# Patient Record
Sex: FEMALE | Race: ASIAN | NOT HISPANIC OR LATINO
[De-identification: names, ages, dates, MRNs, and addresses within clinical notes are randomized per-mention and may not be internally consistent; named-entity substitution may affect disease eponyms.]

---

## 2023-06-01 PROBLEM — Z00.00 ENCOUNTER FOR PREVENTIVE HEALTH EXAMINATION: Status: ACTIVE | Noted: 2023-06-01

## 2023-06-02 ENCOUNTER — APPOINTMENT (OUTPATIENT)
Dept: FAMILY MEDICINE | Facility: CLINIC | Age: 58
End: 2023-06-02
Payer: MEDICAID

## 2023-06-02 VITALS
HEART RATE: 127 BPM | DIASTOLIC BLOOD PRESSURE: 86 MMHG | HEIGHT: 60.5 IN | BODY MASS INDEX: 26.78 KG/M2 | OXYGEN SATURATION: 88 % | SYSTOLIC BLOOD PRESSURE: 138 MMHG | WEIGHT: 140 LBS | TEMPERATURE: 98.9 F

## 2023-06-02 VITALS — OXYGEN SATURATION: 91 %

## 2023-06-02 DIAGNOSIS — Z87.2 PERSONAL HISTORY OF DISEASES OF THE SKIN AND SUBCUTANEOUS TISSUE: ICD-10-CM

## 2023-06-02 DIAGNOSIS — M32.9 SYSTEMIC LUPUS ERYTHEMATOSUS, UNSPECIFIED: ICD-10-CM

## 2023-06-02 DIAGNOSIS — J18.9 PNEUMONIA, UNSPECIFIED ORGANISM: ICD-10-CM

## 2023-06-02 DIAGNOSIS — Z86.39 PERSONAL HISTORY OF OTHER ENDOCRINE, NUTRITIONAL AND METABOLIC DISEASE: ICD-10-CM

## 2023-06-02 DIAGNOSIS — Z78.9 OTHER SPECIFIED HEALTH STATUS: ICD-10-CM

## 2023-06-02 DIAGNOSIS — Z00.01 ENCOUNTER FOR GENERAL ADULT MEDICAL EXAMINATION WITH ABNORMAL FINDINGS: ICD-10-CM

## 2023-06-02 PROCEDURE — 99204 OFFICE O/P NEW MOD 45 MIN: CPT | Mod: 25

## 2023-06-06 PROBLEM — J18.9 PNEUMONIA: Status: ACTIVE | Noted: 2023-06-02

## 2023-06-06 NOTE — HISTORY OF PRESENT ILLNESS
[FreeTextEntry6] : Pt was hospitalized in 2 weeks ago due to covid infection and pneumonia. Pt received anti-viral medication and was discharged home without antibiotic. Pt complained sob, especially with activity. Pt denied chest pain. Home oxygen saturation is around 88-90%. Pt was brought here for evaluation. Pt is a new patient here. Family member, sister stated that pt's condition is deteriorating. \par During hospitalization pt was diagnosed diabetes and started on insulin. Pt was discharged on lantus 10 units qhs and insulin lispro 3 units before each meal. Home morning fasting glucose was around 250-450.

## 2023-06-06 NOTE — REVIEW OF SYSTEMS
[Shortness Of Breath] : shortness of breath [Dyspnea on Exertion] : dyspnea on exertion [Chest Pain] : no chest pain

## 2023-06-06 NOTE — PHYSICAL EXAM
[Normal] : soft, non-tender, non-distended, no masses palpated, no HSM and normal bowel sounds [de-identified] : In mild respiratory distress, right basal crackles, and left basal poor air entry.

## 2023-06-06 NOTE — PHYSICAL EXAM
[Normal] : soft, non-tender, non-distended, no masses palpated, no HSM and normal bowel sounds [de-identified] : In mild respiratory distress, right basal crackles, and left basal poor air entry.

## 2023-06-06 NOTE — PHYSICAL EXAM
[Normal] : soft, non-tender, non-distended, no masses palpated, no HSM and normal bowel sounds [de-identified] : In mild respiratory distress, right basal crackles, and left basal poor air entry.

## 2023-06-15 ENCOUNTER — APPOINTMENT (OUTPATIENT)
Dept: FAMILY MEDICINE | Facility: CLINIC | Age: 58
End: 2023-06-15
Payer: MEDICAID

## 2023-06-15 VITALS — SYSTOLIC BLOOD PRESSURE: 120 MMHG | OXYGEN SATURATION: 96 % | HEART RATE: 122 BPM | DIASTOLIC BLOOD PRESSURE: 83 MMHG

## 2023-06-15 VITALS
OXYGEN SATURATION: 93 % | HEIGHT: 60 IN | SYSTOLIC BLOOD PRESSURE: 126 MMHG | BODY MASS INDEX: 27.29 KG/M2 | HEART RATE: 127 BPM | DIASTOLIC BLOOD PRESSURE: 73 MMHG | TEMPERATURE: 98 F | WEIGHT: 139 LBS

## 2023-06-15 DIAGNOSIS — R19.7 DIARRHEA, UNSPECIFIED: ICD-10-CM

## 2023-06-15 PROCEDURE — 99215 OFFICE O/P EST HI 40 MIN: CPT | Mod: 25

## 2023-06-15 NOTE — PHYSICAL EXAM
[Regular Rhythm] : with a regular rhythm [Normal S1, S2] : normal S1 and S2 [No Murmur] : no murmur heard [Normal] : normal rate, regular rhythm, normal S1 and S2 and no murmur heard [Soft] : abdomen soft [Non Tender] : non-tender [de-identified] : Pt is wearing oxygen 4 L/m with O2 Sat 93% in the office exam room. Pt is calm, with mild respiratory distress. Lung: clear, no wheezing and crackles with good air entry.  [de-identified] : Heart rate 127 /m.

## 2023-06-15 NOTE — REVIEW OF SYSTEMS
[Palpitations] : palpitations [Shortness Of Breath] : shortness of breath [Dyspnea on Exertion] : dyspnea on exertion [Diarrhea] : diarrhea [Chest Pain] : no chest pain [Abdominal Pain] : no abdominal pain [FreeTextEntry9] : No leg swelling

## 2023-06-15 NOTE — HISTORY OF PRESENT ILLNESS
[Post-hospitalization from ___ Hospital] : Post-hospitalization from [unfilled] Hospital [Admitted on: ___] : The patient was admitted on [unfilled] [Discharged on ___] : discharged on [unfilled] [Discharge Summary] : discharge summary [Discharge Med List] : discharge medication list [Med Reconciliation] : medication reconciliation has been completed [FreeTextEntry2] : Pt came back for post-hospitalization follow up. Pt was hospitalized in [facility] from 6/2-13/23. Pt was diagnosed acute pulmonary failure due to covid infection and pneumonia. Pt received IV antibiotics and oxygen treatment. During the hospitalization CT of chest showed mass (lung vs mediastinum) s/p biopsy, pending pathology reports. No access to CT of chest reports. Sister will reach out to hospital for all blood, urine and CT of chest reports for pcp. Pt was discharged with portable home oxygen 2-4 L/m. \par Pt developed loose diarrhea this morning, once, no abdominal pain and fever.

## 2023-06-21 ENCOUNTER — APPOINTMENT (OUTPATIENT)
Dept: PULMONOLOGY | Facility: CLINIC | Age: 58
End: 2023-06-21
Payer: MEDICAID

## 2023-06-21 VITALS
HEIGHT: 60 IN | TEMPERATURE: 98 F | BODY MASS INDEX: 26.9 KG/M2 | HEART RATE: 123 BPM | SYSTOLIC BLOOD PRESSURE: 136 MMHG | OXYGEN SATURATION: 88 % | DIASTOLIC BLOOD PRESSURE: 87 MMHG | RESPIRATION RATE: 16 BRPM | WEIGHT: 137 LBS

## 2023-06-21 DIAGNOSIS — R00.0 TACHYCARDIA, UNSPECIFIED: ICD-10-CM

## 2023-06-21 PROCEDURE — 99203 OFFICE O/P NEW LOW 30 MIN: CPT

## 2023-06-21 NOTE — PHYSICAL EXAM
[No Acute Distress] : no acute distress [Normal Oropharynx] : normal oropharynx [Normal Appearance] : normal appearance [No Neck Mass] : no neck mass [Normal S1, S2] : normal s1, s2 [No Murmurs] : no murmurs [No Resp Distress] : no resp distress [Rhonchi] : rhonchi [No Abnormalities] : no abnormalities [Benign] : benign [Normal Gait] : normal gait [No Cyanosis] : no cyanosis [No Edema] : no edema [FROM] : FROM [Normal Color/ Pigmentation] : normal color/ pigmentation [No Focal Deficits] : no focal deficits [Oriented x3] : oriented x3 [Normal Affect] : normal affect [TextBox_54] : Tachycardic [TextBox_105] : digital clubbing

## 2023-06-21 NOTE — REVIEW OF SYSTEMS
[Fatigue] : fatigue [Sputum] : sputum [SOB on Exertion] : sob on exertion [Negative] : Endocrine [Cough] : no cough

## 2023-06-21 NOTE — DISCUSSION/SUMMARY
[FreeTextEntry1] : 58-year-old female with recently diagnosed thymoma patient was noted to be tachycardic and tachypneic in the office.  She was not able to perform pulmonary function test.  Upon review of her medical documents it was recommended to her and her sister who was present that she be seen in the emergency room.  Thoracic surgery intervention is indicated.  She is to follow-up with her PMD as before.

## 2023-06-21 NOTE — HISTORY OF PRESENT ILLNESS
[Never] : never [TextBox_4] : 58-year-old female presents for evaluation of shortness of breath and cough occasionally productive, since recent discharge from [facility].  The patient was admitted because of similar symptoms, noted to have a mediastinal mass.  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  She was discharged home with scheduled follow-up with CT surgery and oncology.  Patient was also discharged home on nasal oxygen.  The month prior the patient was admitted for COVID-19 infection complaining of shortness of breath and cough at the time.  She denies any prior pulmonary problems.  She is a never smoker.  She has a history of SLE diagnosed 10 years ago having stopped treatment and follow-up with rheumatology almost 5 years prior.  At present she denies fever, chills, chest pain or hemoptysis. [TextBox_29] : Denies snoring, daytime somnolence, apneic episodes, AM headaches

## 2023-06-22 ENCOUNTER — INPATIENT (INPATIENT)
Facility: HOSPITAL | Age: 58
LOS: 7 days | Discharge: HOME | End: 2023-06-30
Attending: HOSPITALIST | Admitting: HOSPITALIST
Payer: MEDICAID

## 2023-06-22 VITALS
DIASTOLIC BLOOD PRESSURE: 84 MMHG | SYSTOLIC BLOOD PRESSURE: 127 MMHG | OXYGEN SATURATION: 100 % | TEMPERATURE: 99 F | HEART RATE: 132 BPM | RESPIRATION RATE: 24 BRPM

## 2023-06-22 LAB
ALBUMIN SERPL ELPH-MCNC: 3.1 G/DL — LOW (ref 3.3–5)
ALP SERPL-CCNC: 123 U/L — HIGH (ref 40–120)
ALT FLD-CCNC: 28 U/L — SIGNIFICANT CHANGE UP (ref 4–33)
ANION GAP SERPL CALC-SCNC: 14 MMOL/L — SIGNIFICANT CHANGE UP (ref 7–14)
ANISOCYTOSIS BLD QL: SIGNIFICANT CHANGE UP
APPEARANCE UR: ABNORMAL
APTT BLD: 32.3 SEC — SIGNIFICANT CHANGE UP (ref 27–36.3)
AST SERPL-CCNC: 47 U/L — HIGH (ref 4–32)
B PERT DNA SPEC QL NAA+PROBE: SIGNIFICANT CHANGE UP
B PERT+PARAPERT DNA PNL SPEC NAA+PROBE: SIGNIFICANT CHANGE UP
BASE EXCESS BLDV CALC-SCNC: 4.3 MMOL/L — HIGH (ref -2–3)
BASOPHILS # BLD AUTO: 0.08 K/UL — SIGNIFICANT CHANGE UP (ref 0–0.2)
BASOPHILS NFR BLD AUTO: 0.9 % — SIGNIFICANT CHANGE UP (ref 0–2)
BILIRUB SERPL-MCNC: 0.5 MG/DL — SIGNIFICANT CHANGE UP (ref 0.2–1.2)
BILIRUB UR-MCNC: NEGATIVE — SIGNIFICANT CHANGE UP
BLOOD GAS VENOUS COMPREHENSIVE RESULT: SIGNIFICANT CHANGE UP
BORDETELLA PARAPERTUSSIS (RAPRVP): SIGNIFICANT CHANGE UP
BUN SERPL-MCNC: 9 MG/DL — SIGNIFICANT CHANGE UP (ref 7–23)
C PNEUM DNA SPEC QL NAA+PROBE: SIGNIFICANT CHANGE UP
CALCIUM SERPL-MCNC: 9.3 MG/DL — SIGNIFICANT CHANGE UP (ref 8.4–10.5)
CHLORIDE BLDV-SCNC: 99 MMOL/L — SIGNIFICANT CHANGE UP (ref 96–108)
CHLORIDE SERPL-SCNC: 99 MMOL/L — SIGNIFICANT CHANGE UP (ref 98–107)
CO2 BLDV-SCNC: 32.2 MMOL/L — HIGH (ref 22–26)
CO2 SERPL-SCNC: 22 MMOL/L — SIGNIFICANT CHANGE UP (ref 22–31)
COLOR SPEC: YELLOW — SIGNIFICANT CHANGE UP
CREAT SERPL-MCNC: 0.46 MG/DL — LOW (ref 0.5–1.3)
DACRYOCYTES BLD QL SMEAR: SLIGHT — SIGNIFICANT CHANGE UP
DIFF PNL FLD: NEGATIVE — SIGNIFICANT CHANGE UP
EGFR: 111 ML/MIN/1.73M2 — SIGNIFICANT CHANGE UP
ELLIPTOCYTES BLD QL SMEAR: SIGNIFICANT CHANGE UP
EOSINOPHIL # BLD AUTO: 0.16 K/UL — SIGNIFICANT CHANGE UP (ref 0–0.5)
EOSINOPHIL NFR BLD AUTO: 1.7 % — SIGNIFICANT CHANGE UP (ref 0–6)
EPI CELLS # UR: 2 /HPF — SIGNIFICANT CHANGE UP (ref 0–5)
FLUAV SUBTYP SPEC NAA+PROBE: SIGNIFICANT CHANGE UP
FLUBV RNA SPEC QL NAA+PROBE: SIGNIFICANT CHANGE UP
GAS PNL BLDV: 132 MMOL/L — LOW (ref 136–145)
GIANT PLATELETS BLD QL SMEAR: PRESENT — SIGNIFICANT CHANGE UP
GLUCOSE BLDV-MCNC: 192 MG/DL — HIGH (ref 70–99)
GLUCOSE SERPL-MCNC: 201 MG/DL — HIGH (ref 70–99)
GLUCOSE UR QL: NEGATIVE — SIGNIFICANT CHANGE UP
HADV DNA SPEC QL NAA+PROBE: SIGNIFICANT CHANGE UP
HCO3 BLDV-SCNC: 30 MMOL/L — HIGH (ref 22–29)
HCOV 229E RNA SPEC QL NAA+PROBE: SIGNIFICANT CHANGE UP
HCOV HKU1 RNA SPEC QL NAA+PROBE: SIGNIFICANT CHANGE UP
HCOV NL63 RNA SPEC QL NAA+PROBE: SIGNIFICANT CHANGE UP
HCOV OC43 RNA SPEC QL NAA+PROBE: SIGNIFICANT CHANGE UP
HCT VFR BLD CALC: 27.5 % — LOW (ref 34.5–45)
HCT VFR BLDA CALC: 27 % — LOW (ref 34.5–46.5)
HGB BLD CALC-MCNC: 8.9 G/DL — LOW (ref 11.7–16.1)
HGB BLD-MCNC: 8.3 G/DL — LOW (ref 11.5–15.5)
HMPV RNA SPEC QL NAA+PROBE: SIGNIFICANT CHANGE UP
HPIV1 RNA SPEC QL NAA+PROBE: SIGNIFICANT CHANGE UP
HPIV2 RNA SPEC QL NAA+PROBE: SIGNIFICANT CHANGE UP
HPIV3 RNA SPEC QL NAA+PROBE: SIGNIFICANT CHANGE UP
HPIV4 RNA SPEC QL NAA+PROBE: SIGNIFICANT CHANGE UP
HYPOCHROMIA BLD QL: SIGNIFICANT CHANGE UP
IANC: 6.49 K/UL — SIGNIFICANT CHANGE UP (ref 1.8–7.4)
INR BLD: 1.24 RATIO — HIGH (ref 0.88–1.16)
KETONES UR-MCNC: NEGATIVE — SIGNIFICANT CHANGE UP
LACTATE BLDV-MCNC: 2.3 MMOL/L — HIGH (ref 0.5–2)
LEUKOCYTE ESTERASE UR-ACNC: ABNORMAL
LYMPHOCYTES # BLD AUTO: 1.52 K/UL — SIGNIFICANT CHANGE UP (ref 1–3.3)
LYMPHOCYTES # BLD AUTO: 16.5 % — SIGNIFICANT CHANGE UP (ref 13–44)
M PNEUMO DNA SPEC QL NAA+PROBE: SIGNIFICANT CHANGE UP
MCHC RBC-ENTMCNC: 17.8 PG — LOW (ref 27–34)
MCHC RBC-ENTMCNC: 30.2 GM/DL — LOW (ref 32–36)
MCV RBC AUTO: 58.9 FL — LOW (ref 80–100)
MICROCYTES BLD QL: SIGNIFICANT CHANGE UP
MONOCYTES # BLD AUTO: 0.32 K/UL — SIGNIFICANT CHANGE UP (ref 0–0.9)
MONOCYTES NFR BLD AUTO: 3.5 % — SIGNIFICANT CHANGE UP (ref 2–14)
NEUTROPHILS # BLD AUTO: 6.98 K/UL — SIGNIFICANT CHANGE UP (ref 1.8–7.4)
NEUTROPHILS NFR BLD AUTO: 75.7 % — SIGNIFICANT CHANGE UP (ref 43–77)
NITRITE UR-MCNC: NEGATIVE — SIGNIFICANT CHANGE UP
OVALOCYTES BLD QL SMEAR: SLIGHT — SIGNIFICANT CHANGE UP
PCO2 BLDV: 54 MMHG — HIGH (ref 39–52)
PH BLDV: 7.36 — SIGNIFICANT CHANGE UP (ref 7.32–7.43)
PH UR: 6 — SIGNIFICANT CHANGE UP (ref 5–8)
PLAT MORPH BLD: NORMAL — SIGNIFICANT CHANGE UP
PLATELET # BLD AUTO: 401 K/UL — HIGH (ref 150–400)
PLATELET COUNT - ESTIMATE: NORMAL — SIGNIFICANT CHANGE UP
PO2 BLDV: 20 MMHG — LOW (ref 25–45)
POIKILOCYTOSIS BLD QL AUTO: SIGNIFICANT CHANGE UP
POLYCHROMASIA BLD QL SMEAR: SLIGHT — SIGNIFICANT CHANGE UP
POTASSIUM BLDV-SCNC: 4.1 MMOL/L — SIGNIFICANT CHANGE UP (ref 3.5–5.1)
POTASSIUM SERPL-MCNC: 5.1 MMOL/L — SIGNIFICANT CHANGE UP (ref 3.5–5.3)
POTASSIUM SERPL-SCNC: 5.1 MMOL/L — SIGNIFICANT CHANGE UP (ref 3.5–5.3)
PROT SERPL-MCNC: 6.4 G/DL — SIGNIFICANT CHANGE UP (ref 6–8.3)
PROT UR-MCNC: ABNORMAL
PROTHROM AB SERPL-ACNC: 14.4 SEC — HIGH (ref 10.5–13.4)
RAPID RVP RESULT: SIGNIFICANT CHANGE UP
RBC # BLD: 4.67 M/UL — SIGNIFICANT CHANGE UP (ref 3.8–5.2)
RBC # FLD: 20.1 % — HIGH (ref 10.3–14.5)
RBC BLD AUTO: ABNORMAL
RBC CASTS # UR COMP ASSIST: 1 /HPF — SIGNIFICANT CHANGE UP (ref 0–4)
RSV RNA SPEC QL NAA+PROBE: SIGNIFICANT CHANGE UP
RV+EV RNA SPEC QL NAA+PROBE: SIGNIFICANT CHANGE UP
SAO2 % BLDV: 22.9 % — LOW (ref 67–88)
SARS-COV-2 RNA SPEC QL NAA+PROBE: SIGNIFICANT CHANGE UP
SCHISTOCYTES BLD QL AUTO: SIGNIFICANT CHANGE UP
SODIUM SERPL-SCNC: 135 MMOL/L — SIGNIFICANT CHANGE UP (ref 135–145)
SP GR SPEC: 1.02 — SIGNIFICANT CHANGE UP (ref 1.01–1.05)
TROPONIN T, HIGH SENSITIVITY RESULT: 10 NG/L — SIGNIFICANT CHANGE UP
UROBILINOGEN FLD QL: SIGNIFICANT CHANGE UP
VARIANT LYMPHS # BLD: 1.7 % — SIGNIFICANT CHANGE UP (ref 0–6)
WBC # BLD: 9.22 K/UL — SIGNIFICANT CHANGE UP (ref 3.8–10.5)
WBC # FLD AUTO: 9.22 K/UL — SIGNIFICANT CHANGE UP (ref 3.8–10.5)
WBC UR QL: 30 /HPF — SIGNIFICANT CHANGE UP (ref 0–5)

## 2023-06-22 PROCEDURE — G1004: CPT

## 2023-06-22 PROCEDURE — 71045 X-RAY EXAM CHEST 1 VIEW: CPT | Mod: 26

## 2023-06-22 PROCEDURE — 99285 EMERGENCY DEPT VISIT HI MDM: CPT

## 2023-06-22 PROCEDURE — 71275 CT ANGIOGRAPHY CHEST: CPT | Mod: 26,MG

## 2023-06-22 RX ORDER — SODIUM CHLORIDE 9 MG/ML
500 INJECTION INTRAMUSCULAR; INTRAVENOUS; SUBCUTANEOUS ONCE
Refills: 0 | Status: COMPLETED | OUTPATIENT
Start: 2023-06-22 | End: 2023-06-22

## 2023-06-22 RX ORDER — ACETAMINOPHEN 500 MG
1000 TABLET ORAL ONCE
Refills: 0 | Status: COMPLETED | OUTPATIENT
Start: 2023-06-22 | End: 2023-06-22

## 2023-06-22 RX ADMIN — Medication 400 MILLIGRAM(S): at 21:34

## 2023-06-22 RX ADMIN — SODIUM CHLORIDE 500 MILLILITER(S): 9 INJECTION INTRAMUSCULAR; INTRAVENOUS; SUBCUTANEOUS at 23:52

## 2023-06-22 NOTE — ED PROVIDER NOTE - NS ED ROS FT
Constitutional:  (+) fever, (+) chills, (-) lethargy  Eyes:  (-) eye pain (-) visual changes  ENMT: (-) nasal discharge, (-) sore throat. (-) neck pain or stiffness  Cardiac: (-) chest pain (-) palpitations  Respiratory:  (+) cough (-) respiratory distress.   GI:  (-) nausea (-) vomiting (-) diarrhea (-) abdominal pain.  :  (-) dysuria (-) frequency (-) burning.  MS:  (-) back pain (-) joint pain.  Neuro:  (-) headache (-) numbness (-) tingling (-) focal weakness  Skin:  (-) rash  Except as documented in the HPI,  all other systems are negative

## 2023-06-22 NOTE — ED ADULT TRIAGE NOTE - CHIEF COMPLAINT QUOTE
Pt is a rapid response coming to from 's office for increased SOB and new diagnosis of lymphoma. currently on 3L NC. Denies chest pain, headache, dizziness. Hx DM, lupus.

## 2023-06-22 NOTE — ED ADULT NURSE NOTE - OBJECTIVE STATEMENT
Patient is awake and alert, sent in from her PMD for SOB at the office today.  Patient with o2 sat 87 on RM , palced on 3 liter nc , now o2 sat 100%. Patient tachycardiac, denies any pain .

## 2023-06-22 NOTE — ED PROCEDURE NOTE - US POC STATEMENT
The patient/family was/were informed of limited nature of the exam. Representative images were printed to be scanned into the chart or directly uploaded into the medical record.
No

## 2023-06-22 NOTE — ED PROVIDER NOTE - OBJECTIVE STATEMENT
58F pmh lupus, recently diagnosed thymoma, DM, and htn presents to the ed for shortness of breath, pt arrives tachycardic + tachypneic, pt states she has been feeling febrile. Pt saw pulmonologist and was told to come to the ED. Pt is not on chemo or treatmetn for lupus at this time. Pt recently admitted to [facility] for workup including lung biopsy.

## 2023-06-22 NOTE — ED PROVIDER NOTE - PROGRESS NOTE DETAILS
MARIA ALEJANDRA'Neo DO PGY-3: paged CT surg PA as this pt is here to see Dr. Travis Saldana (thoracic)

## 2023-06-22 NOTE — ED PROVIDER NOTE - CLINICAL SUMMARY MEDICAL DECISION MAKING FREE TEXT BOX
w/ recently diagnosed thymoma + tachycardia + tachypnea will get sepsis workup including cultures, emperic abx, pt has diffuse b lines on pocus so will get ct angio r/o pe, cautious fluid recussitation, abx, vbg w/ lactate, tumor lysis less likely as pt has no hx of chemotherapy, will give antipyretics, pt will require admission. shortness of breath improved w/ nasal cannula, at this time pt does not need bipap

## 2023-06-22 NOTE — ED PROVIDER NOTE - ATTENDING CONTRIBUTION TO CARE
57 yo M F hx SLE, DM2, HTN, recently dx with thymoma, pw c/o sob, palpitations and fever. Pt recently discharged from hospital ([facility]) where she was admitted for similar symptoms, but symptoms never improved, worsened today. Denies leg pain/swelling. On exam, pt tachypneic with shallow breathing, crackles R base, heart regular rhythm but tachycardic, abd soft nt/nd. Plan for sepsis labs, fluids, meds, CTA chest r/o PE, tba

## 2023-06-22 NOTE — ED PROVIDER NOTE - PHYSICAL EXAMINATION
CONSTITUTIONAL: ill appearing  SKIN: Warm dry, normal skin turgor  HEAD: NCAT  NECK: Supple; non tender. Full ROM.  CARD: hr 120, regular, no murmurs.  RESP: b/l basilar crackles  ABD: non-distended, no rebound or guarding.  MSK: no pedal edema, no calf tenderness  PSYCH: Cooperative, appropriate.

## 2023-06-23 DIAGNOSIS — E11.9 TYPE 2 DIABETES MELLITUS WITHOUT COMPLICATIONS: ICD-10-CM

## 2023-06-23 DIAGNOSIS — R06.02 SHORTNESS OF BREATH: ICD-10-CM

## 2023-06-23 DIAGNOSIS — Z90.710 ACQUIRED ABSENCE OF BOTH CERVIX AND UTERUS: Chronic | ICD-10-CM

## 2023-06-23 DIAGNOSIS — J96.01 ACUTE RESPIRATORY FAILURE WITH HYPOXIA: ICD-10-CM

## 2023-06-23 DIAGNOSIS — D49.89 NEOPLASM OF UNSPECIFIED BEHAVIOR OF OTHER SPECIFIED SITES: ICD-10-CM

## 2023-06-23 DIAGNOSIS — M32.9 SYSTEMIC LUPUS ERYTHEMATOSUS, UNSPECIFIED: ICD-10-CM

## 2023-06-23 DIAGNOSIS — Z29.9 ENCOUNTER FOR PROPHYLACTIC MEASURES, UNSPECIFIED: ICD-10-CM

## 2023-06-23 LAB
ANION GAP SERPL CALC-SCNC: 12 MMOL/L — SIGNIFICANT CHANGE UP (ref 7–14)
BUN SERPL-MCNC: 5 MG/DL — LOW (ref 7–23)
C3 SERPL-MCNC: 179 MG/DL — SIGNIFICANT CHANGE UP (ref 90–180)
C4 SERPL-MCNC: 52 MG/DL — HIGH (ref 10–40)
CALCIUM SERPL-MCNC: 9 MG/DL — SIGNIFICANT CHANGE UP (ref 8.4–10.5)
CHLORIDE SERPL-SCNC: 101 MMOL/L — SIGNIFICANT CHANGE UP (ref 98–107)
CO2 SERPL-SCNC: 26 MMOL/L — SIGNIFICANT CHANGE UP (ref 22–31)
CREAT ?TM UR-MCNC: 82 MG/DL — SIGNIFICANT CHANGE UP
CREAT SERPL-MCNC: 0.36 MG/DL — LOW (ref 0.5–1.3)
CRP SERPL-MCNC: 122.3 MG/L — HIGH
EGFR: 118 ML/MIN/1.73M2 — SIGNIFICANT CHANGE UP
ERYTHROCYTE [SEDIMENTATION RATE] IN BLOOD: 101 MM/HR — HIGH (ref 4–25)
FERRITIN SERPL-MCNC: 1924 NG/ML — HIGH (ref 15–150)
GLUCOSE BLDC GLUCOMTR-MCNC: 219 MG/DL — HIGH (ref 70–99)
GLUCOSE BLDC GLUCOMTR-MCNC: 269 MG/DL — HIGH (ref 70–99)
GLUCOSE BLDC GLUCOMTR-MCNC: 339 MG/DL — HIGH (ref 70–99)
GLUCOSE SERPL-MCNC: 181 MG/DL — HIGH (ref 70–99)
HAPTOGLOB SERPL-MCNC: 344 MG/DL — HIGH (ref 34–200)
HCT VFR BLD CALC: 28.6 % — LOW (ref 34.5–45)
HGB BLD-MCNC: 8.5 G/DL — LOW (ref 11.5–15.5)
LACTATE SERPL-SCNC: 0.7 MMOL/L — SIGNIFICANT CHANGE UP (ref 0.5–2)
LDH SERPL L TO P-CCNC: 336 U/L — HIGH (ref 135–225)
MAGNESIUM SERPL-MCNC: 2.1 MG/DL — SIGNIFICANT CHANGE UP (ref 1.6–2.6)
MCHC RBC-ENTMCNC: 17.4 PG — LOW (ref 27–34)
MCHC RBC-ENTMCNC: 29.7 GM/DL — LOW (ref 32–36)
MCV RBC AUTO: 58.5 FL — LOW (ref 80–100)
MRSA PCR RESULT.: SIGNIFICANT CHANGE UP
NRBC # BLD: 0 /100 WBCS — SIGNIFICANT CHANGE UP (ref 0–0)
NRBC # FLD: 0 K/UL — SIGNIFICANT CHANGE UP (ref 0–0)
PHOSPHATE SERPL-MCNC: 3.3 MG/DL — SIGNIFICANT CHANGE UP (ref 2.5–4.5)
PLATELET # BLD AUTO: 419 K/UL — HIGH (ref 150–400)
POTASSIUM SERPL-MCNC: 3.6 MMOL/L — SIGNIFICANT CHANGE UP (ref 3.5–5.3)
POTASSIUM SERPL-SCNC: 3.6 MMOL/L — SIGNIFICANT CHANGE UP (ref 3.5–5.3)
PROCALCITONIN SERPL-MCNC: 0.08 NG/ML — SIGNIFICANT CHANGE UP (ref 0.02–0.1)
PROCALCITONIN SERPL-MCNC: 0.09 NG/ML — SIGNIFICANT CHANGE UP (ref 0.02–0.1)
PROT ?TM UR-MCNC: 30 MG/DL — SIGNIFICANT CHANGE UP
PROT/CREAT UR-RTO: 0.4 RATIO — HIGH (ref 0–0.2)
RBC # BLD: 4.89 M/UL — SIGNIFICANT CHANGE UP (ref 3.8–5.2)
RBC # FLD: 20.1 % — HIGH (ref 10.3–14.5)
S AUREUS DNA NOSE QL NAA+PROBE: SIGNIFICANT CHANGE UP
SODIUM SERPL-SCNC: 139 MMOL/L — SIGNIFICANT CHANGE UP (ref 135–145)
TROPONIN T, HIGH SENSITIVITY RESULT: 10 NG/L — SIGNIFICANT CHANGE UP
WBC # BLD: 8.15 K/UL — SIGNIFICANT CHANGE UP (ref 3.8–10.5)
WBC # FLD AUTO: 8.15 K/UL — SIGNIFICANT CHANGE UP (ref 3.8–10.5)

## 2023-06-23 PROCEDURE — 99223 1ST HOSP IP/OBS HIGH 75: CPT

## 2023-06-23 PROCEDURE — 99254 IP/OBS CNSLTJ NEW/EST MOD 60: CPT | Mod: GC

## 2023-06-23 PROCEDURE — 99254 IP/OBS CNSLTJ NEW/EST MOD 60: CPT | Mod: 57

## 2023-06-23 PROCEDURE — 99233 SBSQ HOSP IP/OBS HIGH 50: CPT

## 2023-06-23 PROCEDURE — 93010 ELECTROCARDIOGRAM REPORT: CPT

## 2023-06-23 RX ORDER — VANCOMYCIN HCL 1 G
1000 VIAL (EA) INTRAVENOUS ONCE
Refills: 0 | Status: COMPLETED | OUTPATIENT
Start: 2023-06-23 | End: 2023-06-23

## 2023-06-23 RX ORDER — PIPERACILLIN AND TAZOBACTAM 4; .5 G/20ML; G/20ML
3.38 INJECTION, POWDER, LYOPHILIZED, FOR SOLUTION INTRAVENOUS EVERY 8 HOURS
Refills: 0 | Status: DISCONTINUED | OUTPATIENT
Start: 2023-06-23 | End: 2023-06-29

## 2023-06-23 RX ORDER — PIPERACILLIN AND TAZOBACTAM 4; .5 G/20ML; G/20ML
3.38 INJECTION, POWDER, LYOPHILIZED, FOR SOLUTION INTRAVENOUS ONCE
Refills: 0 | Status: COMPLETED | OUTPATIENT
Start: 2023-06-23 | End: 2023-06-23

## 2023-06-23 RX ORDER — INSULIN LISPRO 100/ML
2 VIAL (ML) SUBCUTANEOUS
Refills: 0 | Status: DISCONTINUED | OUTPATIENT
Start: 2023-06-23 | End: 2023-06-24

## 2023-06-23 RX ORDER — ONDANSETRON 8 MG/1
4 TABLET, FILM COATED ORAL EVERY 8 HOURS
Refills: 0 | Status: DISCONTINUED | OUTPATIENT
Start: 2023-06-23 | End: 2023-06-30

## 2023-06-23 RX ORDER — INSULIN LISPRO 100/ML
VIAL (ML) SUBCUTANEOUS
Refills: 0 | Status: DISCONTINUED | OUTPATIENT
Start: 2023-06-23 | End: 2023-06-24

## 2023-06-23 RX ORDER — IPRATROPIUM/ALBUTEROL SULFATE 18-103MCG
3 AEROSOL WITH ADAPTER (GRAM) INHALATION EVERY 6 HOURS
Refills: 0 | Status: DISCONTINUED | OUTPATIENT
Start: 2023-06-23 | End: 2023-06-23

## 2023-06-23 RX ORDER — VANCOMYCIN HCL 1 G
1000 VIAL (EA) INTRAVENOUS EVERY 12 HOURS
Refills: 0 | Status: DISCONTINUED | OUTPATIENT
Start: 2023-06-23 | End: 2023-06-25

## 2023-06-23 RX ORDER — ACETAMINOPHEN 500 MG
325 TABLET ORAL ONCE
Refills: 0 | Status: COMPLETED | OUTPATIENT
Start: 2023-06-23 | End: 2023-06-23

## 2023-06-23 RX ORDER — DEXTROSE 50 % IN WATER 50 %
25 SYRINGE (ML) INTRAVENOUS ONCE
Refills: 0 | Status: DISCONTINUED | OUTPATIENT
Start: 2023-06-23 | End: 2023-06-30

## 2023-06-23 RX ORDER — INSULIN GLARGINE 100 [IU]/ML
15 INJECTION, SOLUTION SUBCUTANEOUS
Refills: 0 | DISCHARGE

## 2023-06-23 RX ORDER — FOLIC ACID 0.8 MG
1 TABLET ORAL
Refills: 0 | DISCHARGE

## 2023-06-23 RX ORDER — ENOXAPARIN SODIUM 100 MG/ML
40 INJECTION SUBCUTANEOUS EVERY 24 HOURS
Refills: 0 | Status: DISCONTINUED | OUTPATIENT
Start: 2023-06-23 | End: 2023-06-30

## 2023-06-23 RX ORDER — FOLIC ACID 0.8 MG
1 TABLET ORAL DAILY
Refills: 0 | Status: DISCONTINUED | OUTPATIENT
Start: 2023-06-23 | End: 2023-06-30

## 2023-06-23 RX ORDER — LEVALBUTEROL 1.25 MG/.5ML
0.63 SOLUTION, CONCENTRATE RESPIRATORY (INHALATION) EVERY 6 HOURS
Refills: 0 | Status: DISCONTINUED | OUTPATIENT
Start: 2023-06-23 | End: 2023-06-26

## 2023-06-23 RX ORDER — LANOLIN ALCOHOL/MO/W.PET/CERES
3 CREAM (GRAM) TOPICAL AT BEDTIME
Refills: 0 | Status: DISCONTINUED | OUTPATIENT
Start: 2023-06-23 | End: 2023-06-30

## 2023-06-23 RX ORDER — SODIUM CHLORIDE 9 MG/ML
1000 INJECTION, SOLUTION INTRAVENOUS
Refills: 0 | Status: DISCONTINUED | OUTPATIENT
Start: 2023-06-23 | End: 2023-06-30

## 2023-06-23 RX ORDER — ACETAMINOPHEN 500 MG
650 TABLET ORAL EVERY 6 HOURS
Refills: 0 | Status: DISCONTINUED | OUTPATIENT
Start: 2023-06-23 | End: 2023-06-30

## 2023-06-23 RX ORDER — DEXTROSE 50 % IN WATER 50 %
15 SYRINGE (ML) INTRAVENOUS ONCE
Refills: 0 | Status: DISCONTINUED | OUTPATIENT
Start: 2023-06-23 | End: 2023-06-30

## 2023-06-23 RX ORDER — GLUCAGON INJECTION, SOLUTION 0.5 MG/.1ML
1 INJECTION, SOLUTION SUBCUTANEOUS ONCE
Refills: 0 | Status: DISCONTINUED | OUTPATIENT
Start: 2023-06-23 | End: 2023-06-30

## 2023-06-23 RX ORDER — ACETYLCYSTEINE 200 MG/ML
4 VIAL (ML) MISCELLANEOUS EVERY 8 HOURS
Refills: 0 | Status: DISCONTINUED | OUTPATIENT
Start: 2023-06-23 | End: 2023-06-26

## 2023-06-23 RX ORDER — INSULIN GLARGINE 100 [IU]/ML
10 INJECTION, SOLUTION SUBCUTANEOUS AT BEDTIME
Refills: 0 | Status: DISCONTINUED | OUTPATIENT
Start: 2023-06-23 | End: 2023-06-24

## 2023-06-23 RX ADMIN — Medication 1000 MILLIGRAM(S): at 01:04

## 2023-06-23 RX ADMIN — Medication 250 MILLIGRAM(S): at 01:17

## 2023-06-23 RX ADMIN — Medication 2 UNIT(S): at 18:34

## 2023-06-23 RX ADMIN — Medication 60 MILLIGRAM(S): at 20:45

## 2023-06-23 RX ADMIN — Medication 2 UNIT(S): at 12:44

## 2023-06-23 RX ADMIN — Medication 3: at 12:44

## 2023-06-23 RX ADMIN — Medication 325 MILLIGRAM(S): at 13:18

## 2023-06-23 RX ADMIN — Medication 1 MILLIGRAM(S): at 12:45

## 2023-06-23 RX ADMIN — INSULIN GLARGINE 10 UNIT(S): 100 INJECTION, SOLUTION SUBCUTANEOUS at 23:03

## 2023-06-23 RX ADMIN — LEVALBUTEROL 0.63 MILLIGRAM(S): 1.25 SOLUTION, CONCENTRATE RESPIRATORY (INHALATION) at 23:50

## 2023-06-23 RX ADMIN — Medication 650 MILLIGRAM(S): at 13:08

## 2023-06-23 RX ADMIN — Medication 600 MILLIGRAM(S): at 22:21

## 2023-06-23 RX ADMIN — PIPERACILLIN AND TAZOBACTAM 200 GRAM(S): 4; .5 INJECTION, POWDER, LYOPHILIZED, FOR SOLUTION INTRAVENOUS at 00:35

## 2023-06-23 RX ADMIN — ENOXAPARIN SODIUM 40 MILLIGRAM(S): 100 INJECTION SUBCUTANEOUS at 18:47

## 2023-06-23 RX ADMIN — Medication 2: at 18:35

## 2023-06-23 RX ADMIN — Medication 250 MILLIGRAM(S): at 20:45

## 2023-06-23 RX ADMIN — Medication 650 MILLIGRAM(S): at 21:45

## 2023-06-23 RX ADMIN — Medication 650 MILLIGRAM(S): at 18:32

## 2023-06-23 RX ADMIN — Medication 4 MILLILITER(S): at 23:50

## 2023-06-23 RX ADMIN — Medication 650 MILLIGRAM(S): at 21:15

## 2023-06-23 RX ADMIN — PIPERACILLIN AND TAZOBACTAM 25 GRAM(S): 4; .5 INJECTION, POWDER, LYOPHILIZED, FOR SOLUTION INTRAVENOUS at 23:03

## 2023-06-23 NOTE — CHART NOTE - NSCHARTNOTEFT_GEN_A_CORE
[facility] contacted me with recommendations - will change levaquin to zosyn/vanc (tolerated zosyn in ED).   Patient has also been tachycardiac off and on today, with intermittent fever. Will check repeat EKG.   Will follow up the rest of pulmonary recs tonight. Will sign out as above to night team.   Case discussed with attending. [facility] Robert contacted me with prelim recommendations - will change levaquin to zosyn/vanc (tolerated zosyn in ED).     Additionally, patient has also been tachycardiac off and on today, with intermittent fever.  Patient seen and examined again at bedside this evening. Labs in process of being drawn.   She reports some chest pressure from the epigastrium up to upper chest. She reports having upper chest pain previously, but now it feels lower down.   She said she feels like symptoms have been getting worse today.   She otherwise denies new complaints.     Vitals stable.   Lungs: decreased breath sounds throughout, crackles at left base (unchanged from admission)  Heart: +tachy, regular, no murmur   Abd: soft, NT, ND, BS+    1. Low suspicion for cardiac etiology at this time however will check EKG and troponin   2. Will follow up the rest of pulmonary recs tonight. Will sign out as above to night team.   3. Above events and plan discussed with Dr. Umaña

## 2023-06-23 NOTE — CONSULT NOTE ADULT - ASSESSMENT
57 yo F with PMH SLE (dx ?12 years ago, not on tx for past 5 years, initially w/ joint pain and pleural effusion), new dx thymoma, recent admission to [facility] x 2 for shortness of breath (initially with covid, then represented with continued shortness of breath) who presents with shortness of breath and abnormal CT chest.     # SLE  # b/l ground glass and reticular opacities  # chronic hypoxemic respiratory failure  # thymoma  Differential is broad and difficult to narrow without prior records. Currently, CT chest shows diffuse bilateral ground glass and reticular opacities. Presumably, these were not present during admission to [facility] as pt had bronchoscopy and mediastinal mass biopsy w/o lung biopsy (this probably would have been done if there were parenchymal abnormalities). However, she was discharged on 3L home O2 which is her current oxygen requirement. She has difficulty speaking in full sentences, though this seems to be due to choking sensation rather than sob.   It seems that she is having difficulty managing secretions and may have ptosis on exam, ?myasthenia.  Rheumatology concerned about MDA-5 disease in setting of rapidly progressive lung disease and fever.  - procal 0.09 -- low suspicion for pneumonia but will cover broadly for now   - BNP 43  - RVP negative  - CXR 6/22: b/l reticular nodular opacities  - CTA Chest 6/22: no PE, 3.9 x 4.9 cm mediastinal mass corresponding to known thymoma.  bilateral diffuse groundglass and reticular opacities with mild bronchiectasis        Plan:  - patient sister will go to [facility] tomorrow to obtain disc w/ prior CT/MRI for comparison  - would broaden abx from levaquin to vanc/zosyn as pt has recent hospitalizations  - duonebs q6  - please provide with aerobika (order as airway clearance device) to use w/ nebulized meds   - sputum cx  - f/u blood cx, urine cx  - f/u TTE  - please check NIF daily  - consider neurology consult   - swallow eval  - agree w/ steroids per rheum (solumedrol 60 qd)  - f/u MDA-5 ab, anti Glory, myomarker panel, dsDNA, ANTOINE, Sjogren, C3, C4, ferritin  - check immunoglobulin levels, myasthenia serologies  - f/u quantiferon   - please obtain records from [facility] (prior hospitalization notes, radiology reports, autoimmune labs

## 2023-06-23 NOTE — CHART NOTE - NSCHARTNOTEFT_GEN_A_CORE
Records release form faxed to [facility] to obtain records from recent hospital stays in June and May 2023.   Will await fax back of records to 5S fax machine.   Patient currently stable, will continue to monitor.

## 2023-06-23 NOTE — CONSULT NOTE ADULT - SUBJECTIVE AND OBJECTIVE BOX
HPI:  58F lupus, DM2, recently diagnosed thymoma p/w shortness of breath. Patient was recently admitted twice to ([facility]) for shortness of breath since May. She was following up with her pulmonologist who recommended for pt to come in. Patient’s ongoing complaints have been of shortness of breath. This started earlier this year and worsened her initial admission to [facility] when she was diagnosed with COVID. Patient also has a history of lupus, diagnosed >10 years ago. She is suppose to be on Plaquenil, but since onset of COVID pandemic, has not followed with a rheumatologist so is no longer taking any lupus medications. Has central chest pain with cough. Cough has clear/white sputum. First fever was in the ED to 102. Pt is also on ?oxygen at home. States shes on 3L at rest. Denies any urinary pain, burning, or frequency.  (23 Jun 2023 08:30)    Thoracic surgery consulted for surgical recommendation for recently dx'd Thymoma. 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 Pt was seen in Pulm office, was pending recommendation of Thoracic Surgeon however pt instructed to go to ER with worsening SOB and clinical deterioration. Pt seen and examined. Appears to be very easy SOB during interview, taking frequent shallow breaths. Sitting at edge of bed, O2 NC in place. Pt appears to have difficulty finishing her sentences. She states her SOB seems worse over past day and since admission to hospital. States generally poor and worsening health since having COVID in May and has been hospitalized for PNA. Pt states no longer has outpt Rheum so hasn't been on her usual Lupus meds. Currently no pain. No HA, dizziness, chills, CP, Abd pain, n/v/d. Pt states frequent palpitations and wt loss.     PAST MEDICAL & SURGICAL HISTORY:  H/O thymoma      Type 2 diabetes mellitus      Systemic lupus      S/P hysterectomy          REVIEW OF SYSTEMS    Negative except for what's noted above    MEDICATIONS  (STANDING):  acetylcysteine 10%  Inhalation 4 milliLiter(s) Inhalation every 8 hours  dextrose 5%. 1000 milliLiter(s) (50 mL/Hr) IV Continuous <Continuous>  dextrose 50% Injectable 25 Gram(s) IV Push once  enoxaparin Injectable 40 milliGRAM(s) SubCutaneous every 24 hours  folic acid 1 milliGRAM(s) Oral daily  glucagon  Injectable 1 milliGRAM(s) IntraMuscular once  insulin glargine Injectable (LANTUS) 10 Unit(s) SubCutaneous at bedtime  insulin lispro (ADMELOG) corrective regimen sliding scale   SubCutaneous three times a day before meals  insulin lispro Injectable (ADMELOG) 2 Unit(s) SubCutaneous three times a day before meals  levoFLOXacin IVPB        MEDICATIONS  (PRN):  acetaminophen     Tablet .. 650 milliGRAM(s) Oral every 6 hours PRN Temp greater or equal to 38C (100.4F), Mild Pain (1 - 3)  albuterol/ipratropium for Nebulization 3 milliLiter(s) Nebulizer every 6 hours PRN Shortness of Breath and/or Wheezing  aluminum hydroxide/magnesium hydroxide/simethicone Suspension 30 milliLiter(s) Oral every 4 hours PRN Dyspepsia  dextrose Oral Gel 15 Gram(s) Oral once PRN Blood Glucose LESS THAN 70 milliGRAM(s)/deciliter  melatonin 3 milliGRAM(s) Oral at bedtime PRN Insomnia  ondansetron Injectable 4 milliGRAM(s) IV Push every 8 hours PRN Nausea and/or Vomiting      Allergies    amoxicillin (Rash (Mild to Mod))    Intolerances        SOCIAL HISTORY:    FAMILY HISTORY:  FH: type 2 diabetes        Vital Signs Last 24 Hrs  T(C): 38 (23 Jun 2023 13:04), Max: 38.9 (22 Jun 2023 21:38)  T(F): 100.4 (23 Jun 2023 13:04), Max: 102 (22 Jun 2023 21:38)  HR: 126 (23 Jun 2023 13:04) (84 - 132)  BP: 126/74 (23 Jun 2023 13:04) (109/65 - 127/84)  BP(mean): --  RR: 18 (23 Jun 2023 13:04) (16 - 24)  SpO2: 98% (23 Jun 2023 13:04) (98% - 100%)    Parameters below as of 23 Jun 2023 13:04  Patient On (Oxygen Delivery Method): nasal cannula  O2 Flow (L/min): 3    General: WD mild distress  Neurology: A&Ox3, nonfocal, ARREOLA x 4  Eyes: PERRLA/ EOMI, Gross vision intact  ENT/Neck: Neck supple, trachea midline, No JVD, Gross hearing intact  Respiratory: dec'd BS throughout with poor air entry  CV: RRR, S1S2, no murmurs, rubs or gallops. + tachycardic  Abdominal: Soft, NT, ND +BS,   Extremities: No edema, + peripheral pulses  Skin: No Rashes, Hematoma, Ecchymosis        LABS:                        8.5    8.15  )-----------( [id number]      ( 23 Jun 2023 08:51 )             28.6     06-23    139  |  101  |  5<L>  ----------------------------<  181<H>  3.6   |  26  |  0.36<L>    Ca    9.0      23 Jun 2023 08:51  Phos  3.3     06-23  Mg     2.10     06-23    TPro  6.4  /  Alb  3.1<L>  /  TBili  0.5  /  DBili  x   /  AST  47<H>  /  ALT  28  /  AlkPhos  123<H>  06-22    PT/INR - ( 22 Jun 2023 21:12 )   PT: 14.4 sec;   INR: 1.24 ratio         PTT - ( 22 Jun 2023 21:12 )  PTT:32.3 sec  Urinalysis Basic - ( 23 Jun 2023 08:51 )    Color: x / Appearance: x / SG: x / pH: x  Gluc: 181 mg/dL / Ketone: x  / Bili: x / Urobili: x   Blood: x / Protein: x / Nitrite: x   Leuk Esterase: x / RBC: x / WBC x   Sq Epi: x / Non Sq Epi: x / Bacteria: x        RADIOLOGY & ADDITIONAL STUDIES:    ACC: [id number] EXAM:  CT ANGIO CHEST PULM ART WAWIC   ORDERED BY:   ADITHYA SHELDON     PROCEDURE DATE:  06/22/2023          INTERPRETATION:  CLINICAL INFORMATION: Shortness of breath. Productive   cough at times. History of recent Covid infection. Thymoma. History of   SLE.    COMPARISON: None.    CONTRAST/COMPLICATIONS:  IV Contrast: Omnipaque 350  40 cc administered   60 cc discarded  Oral Contrast: NONE  Complications: None reported at time of study completion    PROCEDURE:  CT Angiography of the Chest.  Sagittal and coronal reformats were performed as well as 3D (MIP)   reconstructions.    FINDINGS:    LUNGS AND AIRWAYS: Patent central airways.  Nonspecific groundglass and   reticular interstitial opacities in both lungs with mild bronchiectasis.  PLEURA: No pleural effusion. No pneumothorax.  MEDIASTINUM AND GERARD: No lymphadenopathy. 3.9 x 4.9 right anterior   mediastinal mass likely corresponding to patient's known thymoma.  VESSELS: Adequate opacification of the pulmonary vasculature. No filling   defect to suggest acute pulmonary embolism.  HEART: Heart size is normal. No pericardial effusion.  CHEST WALL AND LOWER NECK: Coarse calcification in the left lobe of   thyroid gland.  VISUALIZED UPPER ABDOMEN: Within normal limits.  BONES: Within normal limits.    IMPRESSION:  No pulmonary embolism.  3.9 x 4.9 cm mediastinal mass corresponding to known thymoma.  Nonspecific groundglass and reticular opacities with mild bronchiectasis   in both lungs may be sequela of atypical infection or related to chronic   interstitial disease given history of SLE.    --- End of Report ---

## 2023-06-23 NOTE — PATIENT PROFILE ADULT - FALL HARM RISK - HARM RISK INTERVENTIONS

## 2023-06-23 NOTE — CONSULT NOTE ADULT - PROBLEM SELECTOR RECOMMENDATION 9
Pt is not a surgical candidate at this time due to significant pulmonary disease  Would recommend obtain Echo to eval for Pulmonary HTN  Recommend Pulmonary consult  Recommend send LDH, AFP, Beta HCG (tumor marker), Ant-acetylcholine receptor Antibodies  Above d/w Dr. Tao  Will continue to follow

## 2023-06-23 NOTE — H&P ADULT - NSHPLABSRESULTS_GEN_ALL_CORE
.  LABS:                         8.5    8.15  )-----------( 419      ( 23 Jun 2023 08:51 )             28.6     06-23    139  |  101  |  5<L>  ----------------------------<  181<H>  3.6   |  26  |  0.36<L>    Ca    9.0      23 Jun 2023 08:51  Phos  3.3     06-23  Mg     2.10     06-23    TPro  6.4  /  Alb  3.1<L>  /  TBili  0.5  /  DBili  x   /  AST  47<H>  /  ALT  28  /  AlkPhos  123<H>  06-22    PT/INR - ( 22 Jun 2023 21:12 )   PT: 14.4 sec;   INR: 1.24 ratio         PTT - ( 22 Jun 2023 21:12 )  PTT:32.3 sec  Urinalysis Basic - ( 23 Jun 2023 08:51 )    Color: x / Appearance: x / SG: x / pH: x  Gluc: 181 mg/dL / Ketone: x  / Bili: x / Urobili: x   Blood: x / Protein: x / Nitrite: x   Leuk Esterase: x / RBC: x / WBC x   Sq Epi: x / Non Sq Epi: x / Bacteria: x            Lactate, Blood: 0.7 mmol/L (06-23 @ 01:15)      RADIOLOGY, EKG & ADDITIONAL TESTS:    EKG: personally reviewed. NSR  BMP/CBC: personally reviewed. Stable Cr. Low Hg   CXR: personally reviewed. B/l opacities noted

## 2023-06-23 NOTE — CONSULT NOTE ADULT - SUBJECTIVE AND OBJECTIVE BOX
OSMAN JOHN  [id number]    HISTORY OF PRESENT ILLNESS:    58-year-old F w/PMH of SLE?, thymoma presented with SOB and fever. Patient was previously admission in [facility] due to current symptoms. Rheumatology was consulted to r/o SLE flare up.     At the ED, VS was remarkable for 102F, , RR24. Labs showed H/H 8.3/27.5, MCV 58.9, platelet 401. Chemistry showed glucose of 201, w/, AST 47. UA showed large leukocyte esterase concentration, wbc 30 w/trace protein in the urine. Inflammatory markers showed , and .3. CT chest showed  Nonspecific groundglass and reticular interstitial opacities in both lungs with mild bronchiectasis. 3.9 x 4.9 right anterior mediastinal mass likely corresponding to patient's known thymoma. Patient was started on empiric antibiotic for possible pneumonia.       Consult day     PAST MEDICAL & SURGICAL HISTORY:  H/O thymoma      Type 2 diabetes mellitus      Systemic lupus      S/P hysterectomy          Review of Systems:  Gen:  No fevers/chills, weight loss  HEENT: No blurry vision, no difficulty swallowing, no oral or nasal ulcers  CVS: No chest pain/palpitations  Resp: No SOB/wheezing  GI: No N/V/C/D/abdominal pain  MSK:  Skin: No new rashes  Neuro: No headaches    MEDICATIONS  (STANDING):  acetylcysteine 10%  Inhalation 4 milliLiter(s) Inhalation every 8 hours  dextrose 5%. 1000 milliLiter(s) (50 mL/Hr) IV Continuous <Continuous>  dextrose 50% Injectable 25 Gram(s) IV Push once  enoxaparin Injectable 40 milliGRAM(s) SubCutaneous every 24 hours  folic acid 1 milliGRAM(s) Oral daily  glucagon  Injectable 1 milliGRAM(s) IntraMuscular once  insulin glargine Injectable (LANTUS) 10 Unit(s) SubCutaneous at bedtime  insulin lispro (ADMELOG) corrective regimen sliding scale   SubCutaneous three times a day before meals  insulin lispro Injectable (ADMELOG) 2 Unit(s) SubCutaneous three times a day before meals  levoFLOXacin IVPB        MEDICATIONS  (PRN):  acetaminophen     Tablet .. 650 milliGRAM(s) Oral every 6 hours PRN Temp greater or equal to 38C (100.4F), Mild Pain (1 - 3)  albuterol/ipratropium for Nebulization 3 milliLiter(s) Nebulizer every 6 hours PRN Shortness of Breath and/or Wheezing  aluminum hydroxide/magnesium hydroxide/simethicone Suspension 30 milliLiter(s) Oral every 4 hours PRN Dyspepsia  dextrose Oral Gel 15 Gram(s) Oral once PRN Blood Glucose LESS THAN 70 milliGRAM(s)/deciliter  melatonin 3 milliGRAM(s) Oral at bedtime PRN Insomnia  ondansetron Injectable 4 milliGRAM(s) IV Push every 8 hours PRN Nausea and/or Vomiting      Allergies    amoxicillin (Rash (Mild to Mod))    Intolerances        PERTINENT MEDICATION HISTORY:    SOCIAL HISTORY:  OCCUPATION:  TRAVEL HISTORY:    FAMILY HISTORY:  FH: type 2 diabetes        Vital Signs Last 24 Hrs  T(C): 38 (23 Jun 2023 13:04), Max: 38.9 (22 Jun 2023 21:38)  T(F): 100.4 (23 Jun 2023 13:04), Max: 102 (22 Jun 2023 21:38)  HR: 126 (23 Jun 2023 13:04) (84 - 132)  BP: 126/74 (23 Jun 2023 13:04) (109/65 - 127/84)  BP(mean): --  RR: 18 (23 Jun 2023 13:04) (16 - 24)  SpO2: 98% (23 Jun 2023 13:04) (98% - 100%)    Parameters below as of 23 Jun 2023 13:04  Patient On (Oxygen Delivery Method): nasal cannula  O2 Flow (L/min): 3      Physical Exam:  General: No apparent distress  HEENT: EOMI, MMM  CVS: +S1/S2, RRR, no murmurs/rubs/gallops  Resp: CTA b/l. No crackles/wheezing  GI: Soft, NT/ND +BS  MSK:  Neuro: AAOx3  Skin: no visible rashes    LABS:                        8.5    8.15  )-----------( 419      ( 23 Jun 2023 08:51 )             28.6     06-23    139  |  101  |  5<L>  ----------------------------<  181<H>  3.6   |  26  |  0.36<L>    Ca    9.0      23 Jun 2023 08:51  Phos  3.3     06-23  Mg     2.10     06-23    TPro  6.4  /  Alb  3.1<L>  /  TBili  0.5  /  DBili  x   /  AST  47<H>  /  ALT  28  /  AlkPhos  123<H>  06-22    PT/INR - ( 22 Jun 2023 21:12 )   PT: 14.4 sec;   INR: 1.24 ratio         PTT - ( 22 Jun 2023 21:12 )  PTT:32.3 sec  Urinalysis Basic - ( 23 Jun 2023 08:51 )    Color: x / Appearance: x / SG: x / pH: x  Gluc: 181 mg/dL / Ketone: x  / Bili: x / Urobili: x   Blood: x / Protein: x / Nitrite: x   Leuk Esterase: x / RBC: x / WBC x   Sq Epi: x / Non Sq Epi: x / Bacteria: x        RADIOLOGY & ADDITIONAL STUDIES:     OSMAN LOPEZ  [id number]    HISTORY OF PRESENT ILLNESS:    58-year-old F w/PMH of SLE?, thymoma presented with SOB and fever. Patient was previously admission in [facility] due to current symptoms. Rheumatology was consulted for further management     At the ED, VS was remarkable for 102F, , RR24. Labs showed H/H 8.3/27.5, MCV 58.9, platelet 401. Chemistry showed glucose of 201, w/, AST 47. UA showed large leukocyte esterase concentration, wbc 30 w/trace protein in the urine. Inflammatory markers showed , and .3. CT chest showed  Nonspecific groundglass and reticular interstitial opacities in both lungs with mild bronchiectasis. 3.9 x 4.9 right anterior mediastinal mass likely corresponding to patient's known thymoma. Patient was started on empiric antibiotic for possible pneumonia.     Consult day:  Patient was seen at the bedside. Patient stated that she was on her usual states and started feeling around 5/12/23. Patient stated that she was tested twice for COVID19 and the second time she tested +ve. Through that time, patient feeling fatigue but no SOB. After a week later, she was DC and went to see her primary physician who noticed worsening respiratory status. Patient was having crackles and recommended to go to the hospital. She went for second time requiring ncO2 and she underwent for bronchoscopy. Patient was treatment for COVID in a meantime. Subsequently w/o further improvement, they noticed a mass in the mediastinal window which they got the biopsy. Patient was DC w/3L of ncO2. Patient remains with SOB and came to our ED.     ROS:   Positive: SOB, dry cough, fatigue, fever, chills, night sweats  Negative: RF, sicca, symptoms, muscle weakness, eye pain, eye redness, vision changes, photosensitivity, abdominal pain, n/v/d/c, changes in urinary freq, dysuria, hematuria, foamy urine    #SLE hx?   dx 2010  Previous rheumatologist: Martinez Lopez MD (stop following due to insurance issue since prepandemic COVID 19 era)   Clinical manifestation: SOB, and joint swelling  Previous treatment: steroid, and HCQ  Current treatment: none due to insurance issue      PAST MEDICAL & SURGICAL HISTORY:  H/O thymoma      Type 2 diabetes mellitus      Systemic lupus      S/P hysterectomy          Review of Systems:  H&P    MEDICATIONS  (STANDING):  acetylcysteine 10%  Inhalation 4 milliLiter(s) Inhalation every 8 hours  dextrose 5%. 1000 milliLiter(s) (50 mL/Hr) IV Continuous <Continuous>  dextrose 50% Injectable 25 Gram(s) IV Push once  enoxaparin Injectable 40 milliGRAM(s) SubCutaneous every 24 hours  folic acid 1 milliGRAM(s) Oral daily  glucagon  Injectable 1 milliGRAM(s) IntraMuscular once  insulin glargine Injectable (LANTUS) 10 Unit(s) SubCutaneous at bedtime  insulin lispro (ADMELOG) corrective regimen sliding scale   SubCutaneous three times a day before meals  insulin lispro Injectable (ADMELOG) 2 Unit(s) SubCutaneous three times a day before meals  levoFLOXacin IVPB        MEDICATIONS  (PRN):  acetaminophen     Tablet .. 650 milliGRAM(s) Oral every 6 hours PRN Temp greater or equal to 38C (100.4F), Mild Pain (1 - 3)  albuterol/ipratropium for Nebulization 3 milliLiter(s) Nebulizer every 6 hours PRN Shortness of Breath and/or Wheezing  aluminum hydroxide/magnesium hydroxide/simethicone Suspension 30 milliLiter(s) Oral every 4 hours PRN Dyspepsia  dextrose Oral Gel 15 Gram(s) Oral once PRN Blood Glucose LESS THAN 70 milliGRAM(s)/deciliter  melatonin 3 milliGRAM(s) Oral at bedtime PRN Insomnia  ondansetron Injectable 4 milliGRAM(s) IV Push every 8 hours PRN Nausea and/or Vomiting      Allergies    amoxicillin (Rash (Mild to Mod))    Intolerances        PERTINENT MEDICATION HISTORY:    SOCIAL HISTORY:  OCCUPATION:  TRAVEL HISTORY:    FAMILY HISTORY:  FH: type 2 diabetes        Vital Signs Last 24 Hrs  T(C): 38 (23 Jun 2023 13:04), Max: 38.9 (22 Jun 2023 21:38)  T(F): 100.4 (23 Jun 2023 13:04), Max: 102 (22 Jun 2023 21:38)  HR: 126 (23 Jun 2023 13:04) (84 - 132)  BP: 126/74 (23 Jun 2023 13:04) (109/65 - 127/84)  BP(mean): --  RR: 18 (23 Jun 2023 13:04) (16 - 24)  SpO2: 98% (23 Jun 2023 13:04) (98% - 100%)    Parameters below as of 23 Jun 2023 13:04  Patient On (Oxygen Delivery Method): nasal cannula  O2 Flow (L/min): 3      Physical Exam:  General: No apparent distress  HEENT: EOMI, MMM  CVS: +S1/S2, RRR, no murmurs/rubs/gallops  Resp: Hypoventilation b/l lungs   GI: Soft, NT/ND +BS  MSK: no synovitis, joints NTP    tongue: midline, no deviation   neck flexion/extension: 5/5   deltoid: 5/5 b/l  biceps/triceps: 5/5 b/l  Hand/: 5/5 b/l  Hip flexion/extension: 5/5 b/l  knee flexion/extension: 5/5 b/l    dorsiflex/plantar flex: 5/5 b/l  Neuro: AAOx3  Skin: no visible rashes    LABS:                        8.5    8.15  )-----------( 419      ( 23 Jun 2023 08:51 )             28.6     06-23    139  |  101  |  5<L>  ----------------------------<  181<H>  3.6   |  26  |  0.36<L>    Ca    9.0      23 Jun 2023 08:51  Phos  3.3     06-23  Mg     2.10     06-23    TPro  6.4  /  Alb  3.1<L>  /  TBili  0.5  /  DBili  x   /  AST  47<H>  /  ALT  28  /  AlkPhos  123<H>  06-22    PT/INR - ( 22 Jun 2023 21:12 )   PT: 14.4 sec;   INR: 1.24 ratio         PTT - ( 22 Jun 2023 21:12 )  PTT:32.3 sec  Urinalysis Basic - ( 23 Jun 2023 08:51 )    Color: x / Appearance: x / SG: x / pH: x  Gluc: 181 mg/dL / Ketone: x  / Bili: x / Urobili: x   Blood: x / Protein: x / Nitrite: x   Leuk Esterase: x / RBC: x / WBC x   Sq Epi: x / Non Sq Epi: x / Bacteria: x      /Creatinine Ratio Calculation: 0.4 Ratio (06.23.23 @ 15:09) C-Reactive Protein, Serum: 122.3 mg/L (06.23.23 @ 11:44) Sedimentation Rate, Erythrocyte: 101 mm/hr (06.23.23 @ 11:44) C4 Complement, Serum: 52 mg/dL (06.23.23 @ 11:44) C3 Complement, Serum: 179 mg/dL (06.23.23 @ 11:44) Procalcitonin, Serum: 0.09Chlamydophila pneumoniae; and SARS-CoV-2.  Adenovirus (RapRVP): NotDetec  Influenza A (RapRVP): NotDetec  Influenza B (RapRVP): NotDetec  Parainfluenza 1 (RapRVP): NotDetec  Parainfluenza 2 (RapRVP): NotDetec  Parainfluenza 3 (RapRVP): NotDetec  Parainfluenza 4 (RapRVP): NotDetec  Resp Syncytial Virus (RapRVP): NotDetec  Bordetella pertussis (RapRVP): NotDetec  Bordetella parapertussis (RapRVP): NotDetec  Chlamydia pneumoniae (RapRVP): NotDetec  Mycoplasma pneumoniae (RapRVP): NotDetec  Entero/Rhinovirus (RapRVP): NotDetec  HKU1 Coronavirus (RapRVP): NotDetec  NL63 Coronavirus (RapRVP): NotDetec  229E Coronavirus (RapRVP): NotDetec  OC43 Coronavirus (RapRVP): NotDetec  hMPV (RapRVP): NotDetec  RADIOLOGY & ADDITIONAL STUDIES:  < from: CT Angio Chest PE Protocol w/ IV Cont (06.22.23 @ 22:46) >  LUNGS AND AIRWAYS: Patent central airways.  Nonspecific groundglass and   reticular interstitial opacities in both lungs with mild bronchiectasis.  PLEURA: No pleural effusion. No pneumothorax.  MEDIASTINUM AND GERARD: No lymphadenopathy. 3.9 x 4.9 right anterior   mediastinal mass likely corresponding to patient's known thymoma.    < end of copied text >

## 2023-06-23 NOTE — CONSULT NOTE ADULT - NS ATTEND AMEND GEN_ALL_CORE FT
Patient seen and examined agree with above note as modified, where appropriate, by me. Pt with SOB and bilateral Insterstitial lung disease also found to have thymoma. Given pt's current respiratory status she is not a resection candidate at this time. IF pulmonary status improves will revisit resection.

## 2023-06-23 NOTE — H&P ADULT - PROBLEM SELECTOR PLAN 1
- pt has been admitted twice to  [facility] for shortness of breath since May  - now presents with tachypnea, febrile to 102 meeting sepsis criteria   - CTA scan with no PE, but 3.9 x 4.9 cm mediastinal mass corresponding to known thymoma. Nonspecific groundglass and reticular opacities with mild bronchiectasis in both lungs may be sequela of atypical infection or related to chronic interstitial disease given history of SLE.  - differential includes bacterial PNA, viral PNA, lupus ILD flare, long covid   - UA also positive   - Follow up blood, urine, and sputum cultures  - started on Mucomyst, incentive spirometry    - legionella/strep/MRSA PCR testing ordered   - start with levofloxacin given allergy to amoxicillin (of note: pt did tolerate Zosyn in ED)   - per patient she is on home oxygen at 3L   - need records from [facility]   - lupus labs ordered as below

## 2023-06-23 NOTE — H&P ADULT - NSHPREVIEWOFSYSTEMS_GEN_ALL_CORE
REVIEW OF SYSTEMS:    CONSTITUTIONAL: + fevers or chills  EYES/ENT: No visual changes;  No vertigo or throat pain   NECK: No pain or stiffness  RESPIRATORY: + cough and shortness of breath. No wheezing or hemoptysis  CARDIOVASCULAR: Does have palpitations, chest pain with cough   GASTROINTESTINAL: No abdominal or epigastric pain. No nausea, vomiting, or hematemesis; No diarrhea or constipation. No melena or hematochezia.  GENITOURINARY: No dysuria, frequency or hematuria  NEUROLOGICAL: No numbness or weakness  SKIN: No itching, rashes

## 2023-06-23 NOTE — CONSULT NOTE ADULT - ATTENDING COMMENTS
pt with hx of COVID with sequela of O2 dependency since end of May, was readmitted again for worsening SOB soon after first admission at [facility]. Treated with no more than 10 day course of steroids, and discharged home on 3 LPM O2. Also had thymoma biopsied, feels chest pressure, palpitations, increased SOB. CT chest reviewed with diffuse gg, reticular opacities, mild bronchiectasis, negative for PE, mediastinal mass corresponding to hx of thymoma. Pt currently on 3 L O2, SpO2 94%, speech sounds obstructed with trouble swallowing, feels mucous congestion unable to expectorate, has ptosis on exam, shallow BS. 5/5 upper and lower extremities MS. 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  Check NIF for baseline and trend by RT Q 12 hr.   Stop levaquin, start broad spectrum empiric antibiotics - vanco and zosyn.  airway clearance with albuterol neb and Aerobika.  sputum culture, check procalcitonin.  Check Immunoglobulin panel  MG would not explain lung parenchymal findings - unclear how long they've been there. pt denies SLE related pulmonary symptoms. was on hydroxychloroquine, had hx of pleural effusion which resolved with steroids years ago for SLE.   d/w Rheum, agree with starting solumedrol 1 mg/kg daily.       Asked primary team to get records from outside hospital.  Asked pt's sister to obtain CDROM of chest imaging.

## 2023-06-23 NOTE — PATIENT PROFILE ADULT - NSPROGENOTHERPROVIDER_GEN_A_NUR
visiting nurse services, and oxygen at home prescribed by the previous hospital admission. oxygen concentrator at home and tanks/home care/respiratory services

## 2023-06-23 NOTE — H&P ADULT - PROBLEM SELECTOR PLAN 2
- will need records of prior workup from [facility]   - unclear work up   - if lupus labs return positive or has a lack of improvement  may need further inpatient workup - per OP pulm note: CT-guided biopsy of the mass revealed a type a thymoma. She was discharged home with scheduled follow-up with CT surgery and oncology.  - will need records of prior workup from [facility]   - if lupus labs return positive or has a lack of improvement  may need further inpatient workup

## 2023-06-23 NOTE — ED ADULT NURSE REASSESSMENT NOTE - NS ED NURSE REASSESS COMMENT FT1
break coverage RN: pt remains at baseline mental status, RR even unlabored completing full sentences. pt resting in stretcher comfortably at this time, no new complaints offered. stretcher lowest position siderails up safety measures in place. pt 100% on 2L NC, NSR CM. pt admitted awaiting bed placement

## 2023-06-23 NOTE — CONSULT NOTE ADULT - ASSESSMENT
58-year-old F w/PMH of SLE?, thymoma presented with SOB and fever. Patient was previously admission in [facility] due to current symptoms. Rheumatology was consulted to r/o SLE flare up.  58-year-old F w/PMH of SLE?, thymoma presented with SOB and fever. Patient was previously admission in [facility] due to current symptoms. Rheumatology was consulted for further management       #Acute respiratory failure   -Patient w/previous 2 admission within a month due to rapid respiratory failure in [facility], and was recently DC w/new ncO2 3L  -Patient was tested +ve COVID 19 on 5/12/23   -s/p bronchoscopy in [facility] and mediastinal bx which showed thymoma  -Readmitted in our hospital with fever, and SOB   -Procalcitonin was unremarkable   -CT chest showed severe nonspecific GGO and reticular interstitial opacities in both lungs with mild bronchiectasis.  Differential dx:  patient has a rapidly progressive lung disease w/fever which concern for possible MDA-5 disease although does not have muscle weakness and skin rash, other possible is antisynthetase (does not have the  hands rash) Mayra Montero et al. "Recognition and management of myositis-associated rapidly progressive interstitial lung disease." Chest 158.1 (3821): 252-263.. Acute interstitial pneumonitis witch recent COVID19 infection, and other causes of ARDS. Unlikely bacterial infection having negative procalcitonin.     Plan:    -Dr. Hoffmann discussed with hospitalist and the pulmonologist for the differential of the rapidly progressive lung disease and the benefit of starting empirically with steroid w/low suspicious for acute infection. We will order methylprednisolone 60 mg IV    -check MDA5 antibody, anti-Jo1, and rest myomarker panel 3  -check ferritin level  -check the rest of the lupus serology: dsDNA, ANTOINE, Sjogren, C3, C4  -Draw blood for Quantiferon before starting the methylprednisolone since this can cause false negative  -We will also check hepatitis panel in anticipation of immunosuppressive therapy     DW Dr. Tahira Kauffman MD  PGY-4 Rheumatology Fellow  Pager: [phone]   Available in teams    58-year-old F w/PMH of SLE?, thymoma presented with SOB and fever. Patient was previously admission in [facility] due to current symptoms. Rheumatology was consulted for further management       #Acute respiratory failure   -Patient w/previous 2 admission to [facility] within a month due to rapid progression of shortness of breath, and was recently DC w/new ncO2 3L  -Patient was tested +ve COVID 19 on 5/12/23   -s/p bronchoscopy in [facility] and mediastinal bx which showed thymoma  -Readmitted in our hospital with fever, and SOB   -Procalcitonin was unremarkable   -CT chest showed severe nonspecific GGO and reticular interstitial opacities in both lungs with mild bronchiectasis.  Differential dx:  patient has a rapidly progressive lung disease w/fever which concern for possible MDA-5 disease although does not have muscle weakness and skin rash, other possible is antisynthetase (does not have the  hands rash) Mayra Montero et al. "Recognition and management of myositis-associated rapidly progressive interstitial lung disease." Chest 158.1 (2020): 252-263.. Acute interstitial pneumonitis witch recent COVID19 infection, and other causes of ARDS. Unlikely bacterial infection having negative procalcitonin.     Plan:    -Dr. Hoffmann discussed with hospitalist and the pulmonologist for the differential of the rapidly progressive lung disease and the benefit of starting empirically with steroid w/low suspicious for acute infection. We will order methylprednisolone 60 mg IV    -check MDA5 antibody, anti-Jo1, and rest myomarker panel 3  -check ferritin level  -check the rest of the lupus serology: dsDNA, ANTOINE, Sjogren, C3, C4  -Draw blood for Quantiferon before starting the methylprednisolone since this can cause false negative  -We will also check hepatitis panel in anticipation of immunosuppressive therapy     DW Dr. Tahira Kauffman MD  PGY-4 Rheumatology Fellow  Pager: [phone]   Available in teams

## 2023-06-23 NOTE — H&P ADULT - HISTORY OF PRESENT ILLNESS
58F lupus, DM2, recently diagnosed thymoma p/w shortness of breath. Patient was recently admitted twice to ([facility]) for shortness of breath since May. She was following up with her pulmonologist who recommended for pt to come in. Patient’s ongoing complaints have been of shortness of breath. This started earlier this year and worsened her initial admission to [facility] when she was diagnosed with COVID. Patient also has a history of lupus, diagnosed >10 years ago. She is suppose to be on Plaquenil, but since onset of COVID pandemic, has not followed with a rheumatologist so is no longer taking any lupus medications. Has central chest pain with cough. Cough has clear/white sputum. First fever was in the ED to 102. Pt is also on ?oxygen at home. States shes on 3L at rest. Denies any urinary pain, burning, or frequency.

## 2023-06-23 NOTE — H&P ADULT - PROBLEM SELECTOR PLAN 3
- history of lupus, diagnosed >10 years ago  - suppose to be on Plaquenil, but since onset of COVID pandemic for 3-4 years, has not followed with a rheumatologist - currently on no lupus medications  - f/u AI, complements, DS DNA, ESR, CRP   - if abnormal, consult rheum and pulm

## 2023-06-23 NOTE — H&P ADULT - NSHPPHYSICALEXAM_GEN_ALL_CORE
.  VITAL SIGNS:  T(C): 36.9 (06-23-23 @ 06:30), Max: 38.9 (06-22-23 @ 21:38)  T(F): 98.5 (06-23-23 @ 06:30), Max: 102 (06-22-23 @ 21:38)  HR: 99 (06-23-23 @ 06:30) (84 - 132)  BP: 109/65 (06-23-23 @ 06:30) (109/65 - 127/84)  BP(mean): --  RR: 18 (06-23-23 @ 06:30) (16 - 24)  SpO2: 99% (06-23-23 @ 06:30) (99% - 100%)  Wt(kg): --    PHYSICAL EXAM:    Constitutional: NAD, frail appearing   Head: NC/AT  Eyes: anicteric sclera  Respiratory: decreased breath sounds throughout, crackles in the lung bases    Cardiac: +S1/S2; RRR; no M/R/G  Gastrointestinal: abdomen soft, non-distended, no rebound or guarding; +BSx4  Extremities: no peripheral edema  Musculoskeletal: NROM x4; no joint swelling, tenderness or erythema  Vascular: 2+ radial, DP pulses B/L  Neurologic: AAOx3; no gross focal deficits  Psychiatric: affect and characteristics of appearance, verbalizations, behaviors are appropriate

## 2023-06-23 NOTE — H&P ADULT - PROBLEM SELECTOR PLAN 4
- on lantus 15 units qhs and lispro 5 units TID AC at home   - check A1c   - check lipid panel   - will start lantus 10 units qhs and lispro 2 units while admitted with SS   - titrate as able

## 2023-06-23 NOTE — CHART NOTE - NSCHARTNOTEFT_GEN_A_CORE
Contacted by rheum to ensure stat labs including quantiferon are drawn prior to initiating steroids tonight.   Spoke with primary RN who is drawing labs now. Once drawn, will start steroids as per rheumatology recommendations.   Contacted by house pulmonary who was contacted by rheumatology, that patient is known to Dr. Barton private pulmonologist who comes to [facility].   I requested pulm consult this evening via his answering service, with request for call back to my cell phone.   Above recommendations discussed with Dr. Umaña who will also try to reach Dr. Barton.   Will continue to monitor patient closely.

## 2023-06-24 DIAGNOSIS — A41.9 SEPSIS, UNSPECIFIED ORGANISM: ICD-10-CM

## 2023-06-24 DIAGNOSIS — J96.21 ACUTE AND CHRONIC RESPIRATORY FAILURE WITH HYPOXIA: ICD-10-CM

## 2023-06-24 DIAGNOSIS — Z79.52 LONG TERM (CURRENT) USE OF SYSTEMIC STEROIDS: ICD-10-CM

## 2023-06-24 DIAGNOSIS — D64.9 ANEMIA, UNSPECIFIED: ICD-10-CM

## 2023-06-24 DIAGNOSIS — E11.65 TYPE 2 DIABETES MELLITUS WITH HYPERGLYCEMIA: ICD-10-CM

## 2023-06-24 LAB
A1C WITH ESTIMATED AVERAGE GLUCOSE RESULT: 11.1 % — HIGH (ref 4–5.6)
AFP-TM SERPL-MCNC: 3.4 NG/ML — SIGNIFICANT CHANGE UP
ANA TITR SER: NEGATIVE — SIGNIFICANT CHANGE UP
ANION GAP SERPL CALC-SCNC: 16 MMOL/L — HIGH (ref 7–14)
ANION GAP SERPL CALC-SCNC: 18 MMOL/L — HIGH (ref 7–14)
B-OH-BUTYR SERPL-SCNC: 1 MMOL/L — HIGH (ref 0–0.4)
BASE EXCESS BLDV CALC-SCNC: -1 MMOL/L — SIGNIFICANT CHANGE UP (ref -2–3)
BASE EXCESS BLDV CALC-SCNC: -1.4 MMOL/L — SIGNIFICANT CHANGE UP (ref -2–3)
BLOOD GAS VENOUS COMPREHENSIVE RESULT: SIGNIFICANT CHANGE UP
BUN SERPL-MCNC: 10 MG/DL — SIGNIFICANT CHANGE UP (ref 7–23)
BUN SERPL-MCNC: 11 MG/DL — SIGNIFICANT CHANGE UP (ref 7–23)
CA-I SERPL-SCNC: 1.21 MMOL/L — SIGNIFICANT CHANGE UP (ref 1.15–1.33)
CALCIUM SERPL-MCNC: 9 MG/DL — SIGNIFICANT CHANGE UP (ref 8.4–10.5)
CALCIUM SERPL-MCNC: 9.3 MG/DL — SIGNIFICANT CHANGE UP (ref 8.4–10.5)
CHLORIDE BLDV-SCNC: 97 MMOL/L — SIGNIFICANT CHANGE UP (ref 96–108)
CHLORIDE BLDV-SCNC: 99 MMOL/L — SIGNIFICANT CHANGE UP (ref 96–108)
CHLORIDE SERPL-SCNC: 94 MMOL/L — LOW (ref 98–107)
CHLORIDE SERPL-SCNC: 96 MMOL/L — LOW (ref 98–107)
CHOLEST SERPL-MCNC: 165 MG/DL — SIGNIFICANT CHANGE UP
CK SERPL-CCNC: 13 U/L — LOW (ref 25–170)
CO2 BLDV-SCNC: 23.6 MMOL/L — SIGNIFICANT CHANGE UP (ref 22–26)
CO2 BLDV-SCNC: 26.7 MMOL/L — HIGH (ref 22–26)
CO2 SERPL-SCNC: 17 MMOL/L — LOW (ref 22–31)
CO2 SERPL-SCNC: 21 MMOL/L — LOW (ref 22–31)
CREAT SERPL-MCNC: 0.36 MG/DL — LOW (ref 0.5–1.3)
CREAT SERPL-MCNC: 0.41 MG/DL — LOW (ref 0.5–1.3)
CULTURE RESULTS: SIGNIFICANT CHANGE UP
DSDNA AB SER-ACNC: <12 IU/ML — SIGNIFICANT CHANGE UP
EGFR: 114 ML/MIN/1.73M2 — SIGNIFICANT CHANGE UP
EGFR: 118 ML/MIN/1.73M2 — SIGNIFICANT CHANGE UP
ESTIMATED AVERAGE GLUCOSE: 272 — SIGNIFICANT CHANGE UP
FERRITIN SERPL-MCNC: 1791 NG/ML — HIGH (ref 15–150)
FOLATE SERPL-MCNC: 20 NG/ML — HIGH (ref 3.1–17.5)
GAS PNL BLDV: 129 MMOL/L — LOW (ref 136–145)
GAS PNL BLDV: 133 MMOL/L — LOW (ref 136–145)
GAS PNL BLDV: SIGNIFICANT CHANGE UP
GLUCOSE BLDC GLUCOMTR-MCNC: 240 MG/DL — HIGH (ref 70–99)
GLUCOSE BLDC GLUCOMTR-MCNC: 320 MG/DL — HIGH (ref 70–99)
GLUCOSE BLDC GLUCOMTR-MCNC: 338 MG/DL — HIGH (ref 70–99)
GLUCOSE BLDC GLUCOMTR-MCNC: 374 MG/DL — HIGH (ref 70–99)
GLUCOSE BLDC GLUCOMTR-MCNC: 407 MG/DL — HIGH (ref 70–99)
GLUCOSE BLDC GLUCOMTR-MCNC: 419 MG/DL — HIGH (ref 70–99)
GLUCOSE BLDV-MCNC: 399 MG/DL — HIGH (ref 70–99)
GLUCOSE BLDV-MCNC: 430 MG/DL — HIGH (ref 70–99)
GLUCOSE SERPL-MCNC: 386 MG/DL — HIGH (ref 70–99)
GLUCOSE SERPL-MCNC: 410 MG/DL — HIGH (ref 70–99)
HAV IGM SER-ACNC: SIGNIFICANT CHANGE UP
HBV CORE AB SER-ACNC: SIGNIFICANT CHANGE UP
HBV CORE IGM SER-ACNC: SIGNIFICANT CHANGE UP
HBV SURFACE AG SER-ACNC: SIGNIFICANT CHANGE UP
HCG-TM SERPL-ACNC: <1 MIU/ML — SIGNIFICANT CHANGE UP
HCO3 BLDV-SCNC: 23 MMOL/L — SIGNIFICANT CHANGE UP (ref 22–29)
HCO3 BLDV-SCNC: 25 MMOL/L — SIGNIFICANT CHANGE UP (ref 22–29)
HCT VFR BLD CALC: 25.5 % — LOW (ref 34.5–45)
HCT VFR BLDA CALC: 24 % — LOW (ref 34.5–46.5)
HCT VFR BLDA CALC: 27 % — LOW (ref 34.5–46.5)
HCV AB S/CO SERPL IA: 0.02 S/CO — SIGNIFICANT CHANGE UP (ref 0–0.99)
HCV AB S/CO SERPL IA: 0.02 S/CO — SIGNIFICANT CHANGE UP (ref 0–0.99)
HCV AB SERPL-IMP: SIGNIFICANT CHANGE UP
HCV AB SERPL-IMP: SIGNIFICANT CHANGE UP
HDLC SERPL-MCNC: 36 MG/DL — LOW
HGB BLD CALC-MCNC: 8.1 G/DL — LOW (ref 11.7–16.1)
HGB BLD CALC-MCNC: 8.9 G/DL — LOW (ref 11.7–16.1)
HGB BLD-MCNC: 7.6 G/DL — LOW (ref 11.5–15.5)
IGA FLD-MCNC: 72 MG/DL — LOW (ref 84–499)
IGG FLD-MCNC: 190 MG/DL — LOW (ref 610–1660)
IGM SERPL-MCNC: <10 MG/DL — LOW (ref 35–242)
IRON SATN MFR SERPL: 28 % — SIGNIFICANT CHANGE UP (ref 14–50)
IRON SATN MFR SERPL: 46 UG/DL — SIGNIFICANT CHANGE UP (ref 30–160)
KAPPA LC SER QL IFE: 0.56 MG/DL — SIGNIFICANT CHANGE UP (ref 0.33–1.94)
KAPPA/LAMBDA FREE LIGHT CHAIN RATIO, SERUM: 0.86 RATIO — SIGNIFICANT CHANGE UP (ref 0.26–1.65)
LACTATE BLDV-MCNC: 2.3 MMOL/L — HIGH (ref 0.5–2)
LACTATE BLDV-MCNC: 2.8 MMOL/L — HIGH (ref 0.5–2)
LAMBDA LC SER QL IFE: 0.65 MG/DL — SIGNIFICANT CHANGE UP (ref 0.57–2.63)
LDH SERPL L TO P-CCNC: 290 U/L — HIGH (ref 135–225)
LEGIONELLA AG UR QL: NEGATIVE — SIGNIFICANT CHANGE UP
LIPID PNL WITH DIRECT LDL SERPL: 117 MG/DL — HIGH
MAGNESIUM SERPL-MCNC: 2.1 MG/DL — SIGNIFICANT CHANGE UP (ref 1.6–2.6)
MAGNESIUM SERPL-MCNC: 2.1 MG/DL — SIGNIFICANT CHANGE UP (ref 1.6–2.6)
MCHC RBC-ENTMCNC: 17.6 PG — LOW (ref 27–34)
MCHC RBC-ENTMCNC: 29.8 GM/DL — LOW (ref 32–36)
MCV RBC AUTO: 59.2 FL — LOW (ref 80–100)
NON HDL CHOLESTEROL: 129 MG/DL — SIGNIFICANT CHANGE UP
NRBC # BLD: 0 /100 WBCS — SIGNIFICANT CHANGE UP (ref 0–0)
NRBC # FLD: 0 K/UL — SIGNIFICANT CHANGE UP (ref 0–0)
NT-PROBNP SERPL-SCNC: 105 PG/ML — SIGNIFICANT CHANGE UP
PCO2 BLDV: 34 MMHG — LOW (ref 39–52)
PCO2 BLDV: 49 MMHG — SIGNIFICANT CHANGE UP (ref 39–52)
PH BLDV: 7.32 — SIGNIFICANT CHANGE UP (ref 7.32–7.43)
PH BLDV: 7.43 — SIGNIFICANT CHANGE UP (ref 7.32–7.43)
PHOSPHATE SERPL-MCNC: 3.3 MG/DL — SIGNIFICANT CHANGE UP (ref 2.5–4.5)
PHOSPHATE SERPL-MCNC: 3.8 MG/DL — SIGNIFICANT CHANGE UP (ref 2.5–4.5)
PLATELET # BLD AUTO: 416 K/UL — HIGH (ref 150–400)
PO2 BLDV: 171 MMHG — HIGH (ref 25–45)
PO2 BLDV: 35 MMHG — SIGNIFICANT CHANGE UP (ref 25–45)
POTASSIUM BLDV-SCNC: 4.2 MMOL/L — SIGNIFICANT CHANGE UP (ref 3.5–5.1)
POTASSIUM BLDV-SCNC: 4.3 MMOL/L — SIGNIFICANT CHANGE UP (ref 3.5–5.1)
POTASSIUM SERPL-MCNC: 4.1 MMOL/L — SIGNIFICANT CHANGE UP (ref 3.5–5.3)
POTASSIUM SERPL-MCNC: 4.3 MMOL/L — SIGNIFICANT CHANGE UP (ref 3.5–5.3)
POTASSIUM SERPL-SCNC: 4.1 MMOL/L — SIGNIFICANT CHANGE UP (ref 3.5–5.3)
POTASSIUM SERPL-SCNC: 4.3 MMOL/L — SIGNIFICANT CHANGE UP (ref 3.5–5.3)
PROCALCITONIN SERPL-MCNC: 0.08 NG/ML — SIGNIFICANT CHANGE UP (ref 0.02–0.1)
RBC # BLD: 4.31 M/UL — SIGNIFICANT CHANGE UP (ref 3.8–5.2)
RBC # FLD: 19.9 % — HIGH (ref 10.3–14.5)
S PNEUM AG UR QL: NEGATIVE — SIGNIFICANT CHANGE UP
SAO2 % BLDV: 48.2 % — LOW (ref 67–88)
SAO2 % BLDV: 98.4 % — HIGH (ref 67–88)
SODIUM SERPL-SCNC: 127 MMOL/L — LOW (ref 135–145)
SODIUM SERPL-SCNC: 135 MMOL/L — SIGNIFICANT CHANGE UP (ref 135–145)
SPECIMEN SOURCE: SIGNIFICANT CHANGE UP
TIBC SERPL-MCNC: 164 UG/DL — LOW (ref 220–430)
TRIGL SERPL-MCNC: 59 MG/DL — SIGNIFICANT CHANGE UP
UIBC SERPL-MCNC: 118 UG/DL — SIGNIFICANT CHANGE UP (ref 110–370)
VIT B12 SERPL-MCNC: 589 PG/ML — SIGNIFICANT CHANGE UP (ref 200–900)
WBC # BLD: 6.82 K/UL — SIGNIFICANT CHANGE UP (ref 3.8–10.5)
WBC # FLD AUTO: 6.82 K/UL — SIGNIFICANT CHANGE UP (ref 3.8–10.5)

## 2023-06-24 PROCEDURE — 99233 SBSQ HOSP IP/OBS HIGH 50: CPT

## 2023-06-24 PROCEDURE — 99232 SBSQ HOSP IP/OBS MODERATE 35: CPT | Mod: GC

## 2023-06-24 PROCEDURE — 99254 IP/OBS CNSLTJ NEW/EST MOD 60: CPT | Mod: GC

## 2023-06-24 PROCEDURE — 99254 IP/OBS CNSLTJ NEW/EST MOD 60: CPT

## 2023-06-24 PROCEDURE — 99233 SBSQ HOSP IP/OBS HIGH 50: CPT | Mod: GC

## 2023-06-24 RX ORDER — INSULIN GLARGINE 100 [IU]/ML
25 INJECTION, SOLUTION SUBCUTANEOUS AT BEDTIME
Refills: 0 | Status: DISCONTINUED | OUTPATIENT
Start: 2023-06-24 | End: 2023-06-25

## 2023-06-24 RX ORDER — INSULIN LISPRO 100/ML
10 VIAL (ML) SUBCUTANEOUS
Refills: 0 | Status: DISCONTINUED | OUTPATIENT
Start: 2023-06-24 | End: 2023-06-25

## 2023-06-24 RX ORDER — INSULIN LISPRO 100/ML
VIAL (ML) SUBCUTANEOUS AT BEDTIME
Refills: 0 | Status: DISCONTINUED | OUTPATIENT
Start: 2023-06-24 | End: 2023-06-24

## 2023-06-24 RX ORDER — INSULIN LISPRO 100/ML
VIAL (ML) SUBCUTANEOUS
Refills: 0 | Status: DISCONTINUED | OUTPATIENT
Start: 2023-06-24 | End: 2023-06-30

## 2023-06-24 RX ORDER — INSULIN LISPRO 100/ML
5 VIAL (ML) SUBCUTANEOUS
Refills: 0 | Status: DISCONTINUED | OUTPATIENT
Start: 2023-06-24 | End: 2023-06-24

## 2023-06-24 RX ORDER — INSULIN GLARGINE 100 [IU]/ML
15 INJECTION, SOLUTION SUBCUTANEOUS AT BEDTIME
Refills: 0 | Status: DISCONTINUED | OUTPATIENT
Start: 2023-06-24 | End: 2023-06-24

## 2023-06-24 RX ADMIN — Medication 250 MILLIGRAM(S): at 08:50

## 2023-06-24 RX ADMIN — Medication 8: at 17:50

## 2023-06-24 RX ADMIN — ENOXAPARIN SODIUM 40 MILLIGRAM(S): 100 INJECTION SUBCUTANEOUS at 17:52

## 2023-06-24 RX ADMIN — INSULIN GLARGINE 25 UNIT(S): 100 INJECTION, SOLUTION SUBCUTANEOUS at 23:30

## 2023-06-24 RX ADMIN — Medication 5: at 08:45

## 2023-06-24 RX ADMIN — PIPERACILLIN AND TAZOBACTAM 25 GRAM(S): 4; .5 INJECTION, POWDER, LYOPHILIZED, FOR SOLUTION INTRAVENOUS at 22:23

## 2023-06-24 RX ADMIN — Medication 4 MILLILITER(S): at 22:04

## 2023-06-24 RX ADMIN — LEVALBUTEROL 0.63 MILLIGRAM(S): 1.25 SOLUTION, CONCENTRATE RESPIRATORY (INHALATION) at 22:04

## 2023-06-24 RX ADMIN — Medication 5 UNIT(S): at 12:58

## 2023-06-24 RX ADMIN — PIPERACILLIN AND TAZOBACTAM 25 GRAM(S): 4; .5 INJECTION, POWDER, LYOPHILIZED, FOR SOLUTION INTRAVENOUS at 05:21

## 2023-06-24 RX ADMIN — LEVALBUTEROL 0.63 MILLIGRAM(S): 1.25 SOLUTION, CONCENTRATE RESPIRATORY (INHALATION) at 03:57

## 2023-06-24 RX ADMIN — Medication 1 MILLIGRAM(S): at 12:59

## 2023-06-24 RX ADMIN — Medication 4 MILLILITER(S): at 10:31

## 2023-06-24 RX ADMIN — Medication 250 MILLIGRAM(S): at 20:18

## 2023-06-24 RX ADMIN — Medication 10 UNIT(S): at 17:51

## 2023-06-24 RX ADMIN — Medication 60 MILLIGRAM(S): at 05:21

## 2023-06-24 RX ADMIN — Medication 2 UNIT(S): at 08:44

## 2023-06-24 RX ADMIN — LEVALBUTEROL 0.63 MILLIGRAM(S): 1.25 SOLUTION, CONCENTRATE RESPIRATORY (INHALATION) at 10:31

## 2023-06-24 RX ADMIN — Medication 4 MILLILITER(S): at 16:04

## 2023-06-24 RX ADMIN — LEVALBUTEROL 0.63 MILLIGRAM(S): 1.25 SOLUTION, CONCENTRATE RESPIRATORY (INHALATION) at 16:04

## 2023-06-24 RX ADMIN — Medication 12: at 12:58

## 2023-06-24 RX ADMIN — PIPERACILLIN AND TAZOBACTAM 25 GRAM(S): 4; .5 INJECTION, POWDER, LYOPHILIZED, FOR SOLUTION INTRAVENOUS at 13:03

## 2023-06-24 NOTE — PROGRESS NOTE ADULT - SUBJECTIVE AND OBJECTIVE BOX
PROGRESS NOTE:     Patient is a 58y old  Female who presents with a chief complaint of Shortness of Breath (23 Jun 2023 18:22)      SUBJECTIVE / OVERNIGHT EVENTS: shortness of breath slightly improved.     ADDITIONAL REVIEW OF SYSTEMS:    MEDICATIONS  (STANDING):  acetylcysteine 10%  Inhalation 4 milliLiter(s) Inhalation every 8 hours  dextrose 5%. 1000 milliLiter(s) (50 mL/Hr) IV Continuous <Continuous>  dextrose 50% Injectable 25 Gram(s) IV Push once  enoxaparin Injectable 40 milliGRAM(s) SubCutaneous every 24 hours  folic acid 1 milliGRAM(s) Oral daily  glucagon  Injectable 1 milliGRAM(s) IntraMuscular once  insulin glargine Injectable (LANTUS) 15 Unit(s) SubCutaneous at bedtime  insulin lispro (ADMELOG) corrective regimen sliding scale   SubCutaneous three times a day before meals  insulin lispro Injectable (ADMELOG) 5 Unit(s) SubCutaneous three times a day before meals  levalbuterol Inhalation 0.63 milliGRAM(s) Inhalation every 6 hours  methylPREDNISolone sodium succinate Injectable 60 milliGRAM(s) IV Push daily  piperacillin/tazobactam IVPB.. 3.375 Gram(s) IV Intermittent every 8 hours  vancomycin  IVPB 1000 milliGRAM(s) IV Intermittent every 12 hours    MEDICATIONS  (PRN):  acetaminophen     Tablet .. 650 milliGRAM(s) Oral every 6 hours PRN Temp greater or equal to 38C (100.4F), Mild Pain (1 - 3)  aluminum hydroxide/magnesium hydroxide/simethicone Suspension 30 milliLiter(s) Oral every 4 hours PRN Dyspepsia  dextrose Oral Gel 15 Gram(s) Oral once PRN Blood Glucose LESS THAN 70 milliGRAM(s)/deciliter  guaiFENesin  milliGRAM(s) Oral every 12 hours PRN Cough  melatonin 3 milliGRAM(s) Oral at bedtime PRN Insomnia  ondansetron Injectable 4 milliGRAM(s) IV Push every 8 hours PRN Nausea and/or Vomiting      CAPILLARY BLOOD GLUCOSE      POCT Blood Glucose.: 374 mg/dL (24 Jun 2023 08:33)  POCT Blood Glucose.: 339 mg/dL (23 Jun 2023 22:40)  POCT Blood Glucose.: 219 mg/dL (23 Jun 2023 17:43)  POCT Blood Glucose.: 269 mg/dL (23 Jun 2023 12:33)    I&O's Summary      PHYSICAL EXAM:  Vital Signs Last 24 Hrs  T(C): 36.5 (24 Jun 2023 05:20), Max: 38 (23 Jun 2023 13:04)  T(F): 97.7 (24 Jun 2023 05:20), Max: 100.4 (23 Jun 2023 13:04)  HR: 110 (24 Jun 2023 05:20) (105 - 132)  BP: 122/74 (24 Jun 2023 05:20) (115/76 - 134/83)  BP(mean): --  RR: 18 (24 Jun 2023 05:20) (18 - 19)  SpO2: 98% (24 Jun 2023 05:20) (98% - 99%)    Parameters below as of 24 Jun 2023 05:20  Patient On (Oxygen Delivery Method): nasal cannula  O2 Flow (L/min): 3    Constitutional: NAD, frail appearing   Eyes: anicteric sclera  Respiratory: decreased breath sounds throughout, crackles in the lung bases    Cardiac: +S1/S2; RRR; no M/R/G  Gastrointestinal: abdomen soft, non-distended, no rebound or guarding; +BSx4  Extremities: no peripheral edema, no cyanosis   Musculoskeletal: NROM x4; no joint swelling, tenderness or erythema  Vascular: 2+ radial, DP pulses B/L  Neurologic: AAOx3; no gross focal deficits      LABS:                        7.6    6.82  )-----------( 416      ( 24 Jun 2023 06:27 )             25.5     06-24    135  |  96<L>  |  10  ----------------------------<  386<H>  4.1   |  21<L>  |  0.41<L>    Ca    9.3      24 Jun 2023 06:27  Phos  3.8     06-24  Mg     2.10     06-24    TPro  6.4  /  Alb  3.1<L>  /  TBili  0.5  /  DBili  x   /  AST  47<H>  /  ALT  28  /  AlkPhos  123<H>  06-22    PT/INR - ( 22 Jun 2023 21:12 )   PT: 14.4 sec;   INR: 1.24 ratio         PTT - ( 22 Jun 2023 21:12 )  PTT:32.3 sec  CARDIAC MARKERS ( 24 Jun 2023 06:27 )  x     / x     / 13 U/L / x     / x          Urinalysis Basic - ( 24 Jun 2023 06:27 )    Color: x / Appearance: x / SG: x / pH: x  Gluc: 386 mg/dL / Ketone: x  / Bili: x / Urobili: x   Blood: x / Protein: x / Nitrite: x   Leuk Esterase: x / RBC: x / WBC x   Sq Epi: x / Non Sq Epi: x / Bacteria: x        Culture - Blood (collected 22 Jun 2023 23:53)  Source: .Blood Blood-Peripheral  Preliminary Report (24 Jun 2023 03:01):    No growth to date.    Culture - Blood (collected 22 Jun 2023 19:32)  Source: .Blood Blood-Peripheral  Preliminary Report (24 Jun 2023 01:02):    No growth to date.        RADIOLOGY & ADDITIONAL TESTS:  Results Reviewed:   Imaging Personally Reviewed:  Electrocardiogram Personally Reviewed:    COORDINATION OF CARE:  Care Discussed with Consultants/Other Providers [Y/N]:  Prior or Outpatient Records Reviewed [Y/N]:

## 2023-06-24 NOTE — PROGRESS NOTE ADULT - SUBJECTIVE AND OBJECTIVE BOX
OSMAN JOHN  [id number]    INTERVAL HPI/OVERNIGHT EVENTS: Pt says she feels 50% improved in symptoms since starting nebulizer and steroids. Able to have conversation w/ improvement in sob. Also can walk a bit more w/ improved sob. Palpitations and chest tightness also improved. Denies fever, chills, chest pain, abdominal pain, joint pain, rash.    MEDICATIONS  (STANDING):  acetylcysteine 10%  Inhalation 4 milliLiter(s) Inhalation every 8 hours  dextrose 5%. 1000 milliLiter(s) (50 mL/Hr) IV Continuous <Continuous>  dextrose 50% Injectable 25 Gram(s) IV Push once  enoxaparin Injectable 40 milliGRAM(s) SubCutaneous every 24 hours  folic acid 1 milliGRAM(s) Oral daily  glucagon  Injectable 1 milliGRAM(s) IntraMuscular once  insulin glargine Injectable (LANTUS) 15 Unit(s) SubCutaneous at bedtime  insulin lispro (ADMELOG) corrective regimen sliding scale   SubCutaneous three times a day before meals  insulin lispro Injectable (ADMELOG) 5 Unit(s) SubCutaneous three times a day before meals  levalbuterol Inhalation 0.63 milliGRAM(s) Inhalation every 6 hours  methylPREDNISolone sodium succinate Injectable 60 milliGRAM(s) IV Push daily  piperacillin/tazobactam IVPB.. 3.375 Gram(s) IV Intermittent every 8 hours  vancomycin  IVPB 1000 milliGRAM(s) IV Intermittent every 12 hours    MEDICATIONS  (PRN):  acetaminophen     Tablet .. 650 milliGRAM(s) Oral every 6 hours PRN Temp greater or equal to 38C (100.4F), Mild Pain (1 - 3)  aluminum hydroxide/magnesium hydroxide/simethicone Suspension 30 milliLiter(s) Oral every 4 hours PRN Dyspepsia  dextrose Oral Gel 15 Gram(s) Oral once PRN Blood Glucose LESS THAN 70 milliGRAM(s)/deciliter  guaiFENesin  milliGRAM(s) Oral every 12 hours PRN Cough  melatonin 3 milliGRAM(s) Oral at bedtime PRN Insomnia  ondansetron Injectable 4 milliGRAM(s) IV Push every 8 hours PRN Nausea and/or Vomiting      Allergies    amoxicillin (Rash (Mild to Mod))    Intolerances        Review of Systems: as above    Vital Signs Last 24 Hrs  T(C): 36.5 (24 Jun 2023 05:20), Max: 38 (23 Jun 2023 13:04)  T(F): 97.7 (24 Jun 2023 05:20), Max: 100.4 (23 Jun 2023 13:04)  HR: 110 (24 Jun 2023 05:20) (105 - 132)  BP: 122/74 (24 Jun 2023 05:20) (115/76 - 134/83)  BP(mean): --  RR: 18 (24 Jun 2023 05:20) (18 - 19)  SpO2: 98% (24 Jun 2023 05:20) (98% - 99%)    Parameters below as of 24 Jun 2023 05:20  Patient On (Oxygen Delivery Method): nasal cannula  O2 Flow (L/min): 3      Physical Exam:  General: Breathing on room air, able to have conversation w/o sob. No accessory muscle use  HEENT: EOMI, MMM, +ulcer on roof of mouth  CVS: +S1/S2, tachycardic  Resp: bibasilar crackles  GI: Soft, NT/ND +BS  MSK: no synovitis. 5/5 muscle strength in arms/hands/thighs/legs/feet   Skin: no visible rashes. No  hands    LABS:                        7.6    6.82  )-----------( 416      ( 24 Jun 2023 06:27 )             25.5     06-24    135  |  96<L>  |  10  ----------------------------<  386<H>  4.1   |  21<L>  |  0.41<L>    Ca    9.3      24 Jun 2023 06:27  Phos  3.8     06-24  Mg     2.10     06-24    TPro  6.4  /  Alb  3.1<L>  /  TBili  0.5  /  DBili  x   /  AST  47<H>  /  ALT  28  /  AlkPhos  123<H>  06-22    PT/INR - ( 22 Jun 2023 21:12 )   PT: 14.4 sec;   INR: 1.24 ratio         PTT - ( 22 Jun 2023 21:12 )  PTT:32.3 sec  Urinalysis Basic - ( 24 Jun 2023 06:27 )    Color: x / Appearance: x / SG: x / pH: x  Gluc: 386 mg/dL / Ketone: x  / Bili: x / Urobili: x   Blood: x / Protein: x / Nitrite: x   Leuk Esterase: x / RBC: x / WBC x   Sq Epi: x / Non Sq Epi: x / Bacteria: x

## 2023-06-24 NOTE — CONSULT NOTE ADULT - ASSESSMENT
58y (1965)  woman with a PMHx significant for  SLE (dx ?12 years ago, not on tx for past 5 years, initially w/ joint pain and pleural effusion), new dx thymoma, recent admission to [facility] x 2 for shortness of breath (initially with covid, then represented with continued shortness of breath) who presents with shortness of breath. Neurology consulted for c/f MG. 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  She has noticed progressively worsening dyspnea since discharge over the past week. Can only walk to bathroom and back but is SOB while doing so. She reports cough but difficulty expectorating sputum (dry mouth and dry eyes). Has had fever, chills, night sweats. Also reports chest discomfort that she attributes to the thymoma. Denied weakness, numbness, HA, dizziness, neck weakness, double vision or blurry vision, or change in voice.       Impression: SOB on exertion in the setting of ongoing respiratory lung disease, with no neck weakness, double vision or blurry vision, or change in voice, and non focal exam. Findings less likely consistent with MG but incomplete work up. MG would not explain lung parenchymal findings. As per rheum being worked up for dermatomyositis     Recommendations:  acy  - F/u Anti AchR AB,  - please send for Anti MUSK Ab, Anti striated muscle antibodies if not already collected.  - NIF and VC for baseline function.  - Clinically improved after initiation of antibiotics and steroids.   - Consider avoiding medications like fluoroquinonles, macrolides, amnioglycosides, chloroquines. Consider avoiding anti hypertensives such as beta blockers, CCB, and magnesium    Rest of care per primary     Note incomplete until finalized by attending signature/attestation       58y (1965)  woman with a PMHx significant for  SLE (dx ?12 years ago, not on tx for past 5 years, initially w/ joint pain and pleural effusion), new dx thymoma, recent admission to [facility] x 2 for shortness of breath (initially with covid, then represented with continued shortness of breath) who presents with shortness of breath. Neurology consulted for c/f MG. 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  She has noticed progressively worsening dyspnea since discharge over the past week. Can only walk to bathroom and back but is SOB while doing so. She reports cough but difficulty expectorating sputum (dry mouth and dry eyes). Has had fever, chills, night sweats. Also reports chest discomfort that she attributes to the thymoma. Denied weakness, numbness, HA, dizziness, neck weakness, double vision or blurry vision, or change in voice.       Impression: SOB on exertion in the setting of ongoing respiratory lung disease, with no neck weakness, double vision or blurry vision, or change in voice, and non focal exam. Findings less likely consistent with MG but incomplete work up. MG would not explain lung parenchymal findings. As per rheum being worked up for dermatomyositis     Recommendations:  - F/u Anti AchR AB,  - please send for Anti MUSK Ab, Anti striated muscle antibodies if not already collected.  - NIF and VC for baseline function.  - Clinically improved after initiation of antibiotics and steroids.   - Consider avoiding medications like fluoroquinonles, macrolides, amnioglycosides, chloroquines. Consider avoiding anti hypertensives such as beta blockers, CCB, and magnesium    Rest of care per primary     Note incomplete until finalized by attending signature/attestation

## 2023-06-24 NOTE — PROGRESS NOTE ADULT - SUBJECTIVE AND OBJECTIVE BOX
CHIEF COMPLAINT: SOB    Interval Events:  Reports episode of tachypnea and tachycardia overnight. States that her breathing is significantly improved today.     REVIEW OF SYSTEMS:  Constitutional: [x] negative [ ] fevers [ ] chills [ ] weight loss [ ] weight gain  HEENT: [x] negative [ ] dry eyes [ ] eye irritation [ ] postnasal drip [ ] nasal congestion  CV: [ ] negative  [ ] chest pain [ ] orthopnea [x] palpitations [ ] murmur  Resp: [ ] negative [ ] cough [x] shortness of breath [ ] dyspnea [ ] wheezing [ ] sputum [ ] hemoptysis  GI: [x] negative [ ] nausea [ ] vomiting [ ] diarrhea [ ] constipation [ ] abd pain [ ] dysphagia   : [x] negative [ ] dysuria [ ] nocturia [ ] hematuria [ ] increased urinary frequency  Musculoskeletal: [x] negative [ ] back pain [ ] myalgias [ ] arthralgias [ ] fracture  Skin: [x] negative [ ] rash [ ] itch  Neurological: [x] negative [ ] headache [ ] dizziness [ ] syncope [ ] weakness [ ] numbness  Psychiatric: [x] negative [ ] anxiety [ ] depression  Endocrine: [ ] negative [ ] diabetes [ ] thyroid problem  Hematologic/Lymphatic: [ ] negative [ ] anemia [ ] bleeding problem  Allergic/Immunologic: [ ] negative [ ] itchy eyes [ ] nasal discharge [ ] hives [ ] angioedema  [x] All other systems negative  [ ] Unable to assess ROS because ________    OBJECTIVE:  ICU Vital Signs Last 24 Hrs  T(C): 36.7 (24 Jun 2023 10:05), Max: 37.4 (23 Jun 2023 18:30)  T(F): 98.1 (24 Jun 2023 10:05), Max: 99.3 (23 Jun 2023 18:30)  HR: 100 (24 Jun 2023 16:04) (100 - 132)  BP: 127/81 (24 Jun 2023 10:05) (115/76 - 134/83)  BP(mean): --  ABP: --  ABP(mean): --  RR: 18 (24 Jun 2023 10:05) (18 - 19)  SpO2: 99% (24 Jun 2023 16:04) (97% - 99%)    O2 Parameters below as of 24 Jun 2023 16:04  Patient On (Oxygen Delivery Method): nasal cannula              CAPILLARY BLOOD GLUCOSE      POCT Blood Glucose.: 338 mg/dL (24 Jun 2023 17:34)      PHYSICAL EXAM:  General: NAD  HEENT: EOMI, PERRL  Neck: Supple  Respiratory: Scattered crackles, no increased WOB, or wheezes  Cardiovascular: RRR no mrg  Abdomen: Soft, NT, ND, no rebound or guarding  Extremities: WWP, no edema  Skin: Intact, Resolving LE rash  Neurological: AOx3  Psychiatry: Normal affect    HOSPITAL MEDICATIONS:  MEDICATIONS  (STANDING):  acetylcysteine 10%  Inhalation 4 milliLiter(s) Inhalation every 8 hours  dextrose 5%. 1000 milliLiter(s) (50 mL/Hr) IV Continuous <Continuous>  dextrose 50% Injectable 25 Gram(s) IV Push once  enoxaparin Injectable 40 milliGRAM(s) SubCutaneous every 24 hours  folic acid 1 milliGRAM(s) Oral daily  glucagon  Injectable 1 milliGRAM(s) IntraMuscular once  insulin glargine Injectable (LANTUS) 25 Unit(s) SubCutaneous at bedtime  insulin lispro (ADMELOG) corrective regimen sliding scale   SubCutaneous three times a day before meals  insulin lispro Injectable (ADMELOG) 10 Unit(s) SubCutaneous three times a day before meals  levalbuterol Inhalation 0.63 milliGRAM(s) Inhalation every 6 hours  methylPREDNISolone sodium succinate Injectable 60 milliGRAM(s) IV Push daily  piperacillin/tazobactam IVPB.. 3.375 Gram(s) IV Intermittent every 8 hours  vancomycin  IVPB 1000 milliGRAM(s) IV Intermittent every 12 hours    MEDICATIONS  (PRN):  acetaminophen     Tablet .. 650 milliGRAM(s) Oral every 6 hours PRN Temp greater or equal to 38C (100.4F), Mild Pain (1 - 3)  aluminum hydroxide/magnesium hydroxide/simethicone Suspension 30 milliLiter(s) Oral every 4 hours PRN Dyspepsia  dextrose Oral Gel 15 Gram(s) Oral once PRN Blood Glucose LESS THAN 70 milliGRAM(s)/deciliter  guaiFENesin  milliGRAM(s) Oral every 12 hours PRN Cough  melatonin 3 milliGRAM(s) Oral at bedtime PRN Insomnia  ondansetron Injectable 4 milliGRAM(s) IV Push every 8 hours PRN Nausea and/or Vomiting      LABS:                        7.6    6.82  )-----------( 416      ( 24 Jun 2023 06:27 )             25.5     Hgb Trend: 7.6<--, 8.5<--, 8.3<--  06-24    127<L>  |  94<L>  |  11  ----------------------------<  410<H>  4.3   |  17<L>  |  0.36<L>    Ca    9.0      24 Jun 2023 11:31  Phos  3.3     06-24  Mg     2.10     06-24    TPro  6.4  /  Alb  3.1<L>  /  TBili  0.5  /  DBili  x   /  AST  47<H>  /  ALT  28  /  AlkPhos  123<H>  06-22    Creatinine Trend: 0.36<--, 0.41<--, 0.36<--, 0.46<--  PT/INR - ( 22 Jun 2023 21:12 )   PT: 14.4 sec;   INR: 1.24 ratio         PTT - ( 22 Jun 2023 21:12 )  PTT:32.3 sec  Urinalysis Basic - ( 24 Jun 2023 11:31 )    Color: x / Appearance: x / SG: x / pH: x  Gluc: 410 mg/dL / Ketone: x  / Bili: x / Urobili: x   Blood: x / Protein: x / Nitrite: x   Leuk Esterase: x / RBC: x / WBC x   Sq Epi: x / Non Sq Epi: x / Bacteria: x        Venous Blood Gas:  06-24 @ 11:31  7.43/34/171/23/98.4  VBG Lactate: 2.8  Venous Blood Gas:  06-24 @ 06:27  7.32/49/35/25/48.2  VBG Lactate: 2.3  Venous Blood Gas:  06-22 @ 19:35  7.36/54/20/30/22.9  VBG Lactate: 2.3      MICROBIOLOGY:     Culture - Blood (collected 22 Jun 2023 23:53)  Source: .Blood Blood-Peripheral  Preliminary Report (24 Jun 2023 03:01):    No growth to date.    Culture - Blood (collected 22 Jun 2023 19:32)  Source: .Blood Blood-Peripheral  Preliminary Report (24 Jun 2023 01:02):    No growth to date.        RADIOLOGY:  [x] Reviewed and interpreted by me    PULMONARY FUNCTION TESTS:    EKG:

## 2023-06-24 NOTE — PHYSICAL THERAPY INITIAL EVALUATION ADULT - LEVEL OF INDEPENDENCE: GAIT, REHAB EVAL
Pt noticeably tachypneic, complains of SOB with ambulation, SpO2:89-93% throughout, RN aware./contact guard

## 2023-06-24 NOTE — CONSULT NOTE ADULT - SUBJECTIVE AND OBJECTIVE BOX
HPI: consult for hyperglycemia  58F lupus, DM2, recently diagnosed thymoma p/w shortness of breath. Patient was recently admitted twice to ([facility]) for shortness of breath since May. She was following up with her pulmonologist who recommended for pt to come in. Patient’s ongoing complaints have been of shortness of breath. This started earlier this year and worsened her initial admission to [facility] when she was diagnosed with COVID. Patient also has a history of lupus, diagnosed >10 years ago. She is suppose to be on Plaquenil, but since onset of COVID pandemic, has not followed with a rheumatologist so is no longer taking any lupus medications. Has central chest pain with cough. Cough has clear/white sputum. First fever was in the ED to 102. Pt is also on ?oxygen at home. States shes on 3L at rest. Denies any urinary pain, burning, or frequency.  (23 Jun 2023 08:30)    Endocrine: Recent diagnosis type 2 DM 5/2023, but did not have medical care for several years.  discharged form [facility] on basal bolus, saw Dr Misty Dominguez, increased to Lantus 15 and premeal lispro 5. BS at home 200s-400s. No hypos  .  PAST MEDICAL & SURGICAL HISTORY:  H/O thymoma  Type 2 diabetes mellitus   Systemic lupus  S/P hysterectomy    FAMILY HISTORY:  FH: type 2 diabetes brohter  Sister: graves disease    Social History: -cigs     Outpatient Medications: insulin as noted above    MEDICATIONS  (STANDING):  acetylcysteine 10%  Inhalation 4 milliLiter(s) Inhalation every 8 hours  dextrose 5%. 1000 milliLiter(s) (50 mL/Hr) IV Continuous <Continuous>  dextrose 50% Injectable 25 Gram(s) IV Push once  enoxaparin Injectable 40 milliGRAM(s) SubCutaneous every 24 hours  folic acid 1 milliGRAM(s) Oral daily  glucagon  Injectable 1 milliGRAM(s) IntraMuscular once  insulin glargine Injectable (LANTUS) 15 Unit(s) SubCutaneous at bedtime  insulin lispro (ADMELOG) corrective regimen sliding scale   SubCutaneous three times a day before meals  insulin lispro Injectable (ADMELOG) 5 Unit(s) SubCutaneous three times a day before meals  levalbuterol Inhalation 0.63 milliGRAM(s) Inhalation every 6 hours  methylPREDNISolone sodium succinate Injectable 60 milliGRAM(s) IV Push daily  piperacillin/tazobactam IVPB.. 3.375 Gram(s) IV Intermittent every 8 hours  vancomycin  IVPB 1000 milliGRAM(s) IV Intermittent every 12 hours    MEDICATIONS  (PRN):  acetaminophen     Tablet .. 650 milliGRAM(s) Oral every 6 hours PRN Temp greater or equal to 38C (100.4F), Mild Pain (1 - 3)  aluminum hydroxide/magnesium hydroxide/simethicone Suspension 30 milliLiter(s) Oral every 4 hours PRN Dyspepsia  dextrose Oral Gel 15 Gram(s) Oral once PRN Blood Glucose LESS THAN 70 milliGRAM(s)/deciliter  guaiFENesin  milliGRAM(s) Oral every 12 hours PRN Cough  melatonin 3 milliGRAM(s) Oral at bedtime PRN Insomnia  ondansetron Injectable 4 milliGRAM(s) IV Push every 8 hours PRN Nausea and/or Vomiting      Allergies    amoxicillin (Rash (Mild to Mod))  .    Review of Systems: states wt stable   Constitutional: No fever  Eyes: No blurry vision  Neuro: No tremors  HEENT: No pain  Cardiovascular: No chest pain, palpitations  Respiratory: No SOB, no cough  GI: No nausea, vomiting, abdominal pain  : No dysuria  Skin: no rash  Endocrine: no polyuria, polydipsia  .Osteoporosis: no fractures Has not had bone density testing    N    PHYSICAL EXAM:  VITALS: T(C): 36.7 (06-24-23 @ 10:05)  T(F): 98.1 (06-24-23 @ 10:05), Max: 99.3 (06-23-23 @ 18:30)  HR: 102 (06-24-23 @ 10:31) (102 - 132)  BP: 127/81 (06-24-23 @ 10:05) (115/76 - 134/83)  RR:  (18 - 19)  SpO2:  (97% - 99%)  Wt(kg): --64 kg  GENERAL: NAD, well-groomed, well-developed  EYES: No proptosis, no lid lag, anicteric  HEENT:  Atraumatic, Normocephalic, moist mucous membranes  THYROID: Normal size, no palpable nodules  RESPIRATORY: Clear to auscultation bilaterally; No rales, rhonchi, wheezing, or rubs  CARDIOVASCULAR: Regular rate and rhythm; No murmurs; no peripheral edema  GI: Soft, nontender, non distended, normal bowel sounds  SKIN: Dry, intact, No rashes or lesions  Feet: skin intact, good pulses normal sense  .PSYCH: Alert and oriented x 3, normal affect, normal mood  CUSHING'S SIGNS: no striae    POCT Blood Glucose.: 320 mg/dL (06-24-23 @ 14:33)  POCT Blood Glucose.: 419 mg/dL (06-24-23 @ 12:41)  POCT Blood Glucose.: 407 mg/dL (06-24-23 @ 12:40)  POCT Blood Glucose.: 374 mg/dL (06-24-23 @ 08:33)  POCT Blood Glucose.: 339 mg/dL (06-23-23 @ 22:40)  POCT Blood Glucose.: 219 mg/dL (06-23-23 @ 17:43)  POCT Blood Glucose.: 269 mg/dL (06-23-23 @ 12:33)  POCT Blood Glucose.: 240 mg/dL (06-22-23 @ 18:32)                            7.6    6.82  )-----------( 416      ( 24 Jun 2023 06:27 )             25.5       06-24    127<L>  |  94<L>  |  11  ----------------------------<  410<H>  4.3   |  17<L>  |  0.36<L>    eGFR: 118    Ca    9.0      06-24  Mg     2.10     06-24  Phos  3.3     06-24    TPro  6.4  /  Alb  3.1<L>  /  TBili  0.5  /  DBili  x   /  AST  47<H>  /  ALT  28  /  AlkPhos  123<H>  06-22      Thyroid Function Tests:  06-22 @ 19:35 TSH 1.03 FreeT4 -- T3 -- Anti TPO -- Anti Thyroglobulin Ab -- TSI --          06-24 Chol 165 Direct LDL -- LDL calculated 117<H> HDL 36<L> Trig 59    Radiology:

## 2023-06-24 NOTE — PROGRESS NOTE ADULT - ASSESSMENT
59 yo F with PMH SLE (dx ?12 years ago, not on tx for past 5 years, initially w/ joint pain and pleural effusion), new dx thymoma, recent admission to [facility] x 2 for shortness of breath (initially with covid, then represented with continued shortness of breath) who presents with shortness of breath and abnormal CT chest.     # SLE  # b/l ground glass and reticular opacities  # chronic hypoxemic respiratory failure  # thymoma  Differential is broad and difficult to narrow without prior records. Currently, CT chest shows diffuse bilateral ground glass and reticular opacities. Presumably, these were not present during admission to [facility] as pt had bronchoscopy and mediastinal mass biopsy w/o lung biopsy (this probably would have been done if there were parenchymal abnormalities). However, she was discharged on 3L home O2 which is her current oxygen requirement. She has difficulty speaking in full sentences, though this seems to be due to choking sensation rather than sob.   It seems that she is having difficulty managing secretions and may have ptosis on exam, ?myasthenia.  Rheumatology concerned about MDA-5 disease in setting of rapidly progressive lung disease and fever.  - procal 0.09 -- low suspicion for pneumonia but will cover broadly for now   - BNP 43  - RVP negative  - CXR 6/22: b/l reticular nodular opacities  - CTA Chest 6/22: no PE, 3.9 x 4.9 cm mediastinal mass corresponding to known thymoma.  bilateral diffuse groundglass and reticular opacities with mild bronchiectasis        Plan:  - f/u [facility] records w/ prior CT/MRI for comparison  - c/w empiric abx  - duonebs q6  - please provide with aerobika (order as airway clearance device) to use w/ nebulized meds   - sputum cx  - f/u blood cx, urine cx  - f/u TTE  - please check NIF daily  - consider neurology consult if signs of worsening weakness/possible MG  - swallow eval  - agree w/ steroids per rheum (solumedrol 60 qd)  - f/u MDA-5 ab, anti Glory, myomarker panel, dsDNA, ANTOINE, Sjogren, C3, C4, ferritin  - check immunoglobulin levels, myasthenia serologies  - f/u quantiferon   - please obtain records from [facility] (prior hospitalization notes, radiology reports, autoimmune labs

## 2023-06-24 NOTE — CONSULT NOTE ADULT - SUBJECTIVE AND OBJECTIVE BOX
Neurology - Consult Note    -  Spectra: [phone] ([facility]), [phone] ([facility])  -    HPI: Patient OSMAN LOPEZ is a 58y (1965)  woman with a PMHx significant for  SLE (dx ?12 years ago, not on tx for past 5 years, initially w/ joint pain and pleural effusion), new dx thymoma, recent admission to [facility] x 2 for shortness of breath (initially with covid, then represented with continued shortness of breath) who presents with shortness of breath. Neurology consulted for c/f MG. As per hpi, pt was admitted to [facility] in 5/2023 and was found to be covid positive. She was treated with steroids and discharged home ?without oxygen. She then followed up with her PCP who was concerned about her shortness of breath. She recommended that she return to the hospital. She was readmitted and found to have a mediastinal mass. She had CT chest and MRI chest. She underwent bronchoscopy with mediastinal mass biopsy (thymoma), no lung biopsy done. She was then discharged 1 week ago. She was discharged with 3L home O2.    biopsy showed positive [id number] cytokeratin and PAX8 neg for epithelial component - type A thymoma.     She has noticed progressively worsening dyspnea since discharge over the past week. Can only walk to bathroom and back but is SOB while doing so. She reports cough but difficulty expectorating sputum (dry mouth and dry eyes). Has had fever, chills, night sweats. Also reports chest discomfort that she attributes to the thymoma. Denied weakness, numbness, HA, dizziness, neck weakness, double vision or blurry vision  or change in voice.       On admission to ED, SpO2 100 on 5L NC. Afebrile. WBC 9. VBG 7.36/54. RVP negative.  CXR 6/22: b/l reticular nodular opacities  CTA Chest 6/22: no PE, 3.9 x 4.9 cm mediastinal mass corresponding to known thymoma.  bilateral diffuse groundglass and reticular opacities with mild bronchiectasis     Rheumatology consulted regarding SLE, pulmonary consulted for abnormal CT chest and sob. CTS c/s re: thymoma.      Review of Systems:   All other review of systems is negative unless indicated above.    Allergies:  amoxicillin (Rash (Mild to Mod))      PMHx/PSHx/Family Hx: As above, otherwise see below   H/O thymoma    Type 2 diabetes mellitus    Systemic lupus      Medications:  MEDICATIONS  (STANDING):  acetylcysteine 10%  Inhalation 4 milliLiter(s) Inhalation every 8 hours  dextrose 5%. 1000 milliLiter(s) (50 mL/Hr) IV Continuous <Continuous>  dextrose 50% Injectable 25 Gram(s) IV Push once  enoxaparin Injectable 40 milliGRAM(s) SubCutaneous every 24 hours  folic acid 1 milliGRAM(s) Oral daily  glucagon  Injectable 1 milliGRAM(s) IntraMuscular once  insulin glargine Injectable (LANTUS) 25 Unit(s) SubCutaneous at bedtime  insulin lispro (ADMELOG) corrective regimen sliding scale   SubCutaneous three times a day before meals  insulin lispro Injectable (ADMELOG) 10 Unit(s) SubCutaneous three times a day before meals  levalbuterol Inhalation 0.63 milliGRAM(s) Inhalation every 6 hours  methylPREDNISolone sodium succinate Injectable 60 milliGRAM(s) IV Push daily  piperacillin/tazobactam IVPB.. 3.375 Gram(s) IV Intermittent every 8 hours  vancomycin  IVPB 1000 milliGRAM(s) IV Intermittent every 12 hours    MEDICATIONS  (PRN):  acetaminophen     Tablet .. 650 milliGRAM(s) Oral every 6 hours PRN Temp greater or equal to 38C (100.4F), Mild Pain (1 - 3)  aluminum hydroxide/magnesium hydroxide/simethicone Suspension 30 milliLiter(s) Oral every 4 hours PRN Dyspepsia  dextrose Oral Gel 15 Gram(s) Oral once PRN Blood Glucose LESS THAN 70 milliGRAM(s)/deciliter  guaiFENesin  milliGRAM(s) Oral every 12 hours PRN Cough  melatonin 3 milliGRAM(s) Oral at bedtime PRN Insomnia  ondansetron Injectable 4 milliGRAM(s) IV Push every 8 hours PRN Nausea and/or Vomiting        Home Medications:  Admelog 100 units/mL injectable solution: 5 unit(s) injectable 3 times a day (before meals) (23 Jun 2023 09:25)  folic acid 1 mg oral tablet: 1 tab(s) orally once a day (23 Jun 2023 09:25)  Lantus 100 units/mL subcutaneous solution: 15 unit(s) subcutaneous once a day (at bedtime) (23 Jun 2023 09:25)      Vitals:  T(C): 36.7 (06-24-23 @ 10:05), Max: 37.4 (06-23-23 @ 18:30)  HR: 100 (06-24-23 @ 16:04) (100 - 132)  BP: 127/81 (06-24-23 @ 10:05) (115/76 - 134/83)  RR: 18 (06-24-23 @ 10:05) (18 - 19)  SpO2: 99% (06-24-23 @ 16:04) (97% - 99%)    Physical Examination:   General - NAD  Cardiovascular - Peripheral pulses palpable, no edema  Pulm: on supplemental o2    Neurologic Exam:  Mental status - Awake, Alert, Oriented to person, place, and time. Speech fluent, repetition and naming intact. Follows simple and complex commands.     Cranial nerves - PERRL, VFF, EOMI, face sensation (V1-V3) intact LT, No facial asymmetry b/l, hearing grossly intact b/l, trapezius 5/5 strength b/l, tongue midline on protrusion   No drooping of eye/eyelid noted on exam     Motor - Normal bulk and tone throughout. No pronator drift.  Strength testing            Deltoid      Biceps      Triceps     Wrist Extension    Wrist Flexion       R            5                 5               5                     5                              5                        5  L             5                 5               5                     5                              5                       5              Hip Flexion    Hip Extension    Knee Flexion    Knee Extension    Dorsiflexion    Plantar Flexion  R              5                           5                       5                           5                            5                          5  L              5                           5                        5                           5                            5                          5    neck flex/ext 5/5     Can count to 15 before becoming SOB     Sensation - Light touch/temperature intact throughout    DTR's -             Biceps      Triceps     Brachioradialis      Patellar    Ankle    Toes/plantar response  R             2+             2+                  2+                       2+            2+                 Down  L              2+             2+                 2+                        2+           2+                 Down    Coordination - Finger to Nose intact b/l. No tremors appreciated    Gait and station - Normal casual gait.      Labs:                        7.6    6.82  )-----------( 416      ( 24 Jun 2023 06:27 )             25.5     06-24    127<L>  |  94<L>  |  11  ----------------------------<  410<H>  4.3   |  17<L>  |  0.36<L>    Ca    9.0      24 Jun 2023 11:31  Phos  3.3     06-24  Mg     2.10     06-24    TPro  6.4  /  Alb  3.1<L>  /  TBili  0.5  /  DBili  x   /  AST  47<H>  /  ALT  28  /  AlkPhos  123<H>  06-22    CAPILLARY BLOOD GLUCOSE      POCT Blood Glucose.: 338 mg/dL (24 Jun 2023 17:34)    LIVER FUNCTIONS - ( 22 Jun 2023 19:35 )  Alb: 3.1 g/dL / Pro: 6.4 g/dL / ALK PHOS: 123 U/L / ALT: 28 U/L / AST: 47 U/L / GGT: x             Culture - Blood (collected 22 Jun 2023 23:53)  Source: .Blood Blood-Peripheral  Preliminary Report (24 Jun 2023 03:01):    No growth to date.    Culture - Blood (collected 22 Jun 2023 19:32)  Source: .Blood Blood-Peripheral  Preliminary Report (24 Jun 2023 01:02):    No growth to date.      PT/INR - ( 22 Jun 2023 21:12 )   PT: 14.4 sec;   INR: 1.24 ratio         PTT - ( 22 Jun 2023 21:12 )  PTT:32.3 sec  CSF:                  Radiology:    < from: CT Angio Chest PE Protocol w/ IV Cont (06.22.23 @ 22:46) >  IMPRESSION:  No pulmonary embolism.  3.9 x 4.9 cm mediastinal mass corresponding to known thymoma.  Nonspecific groundglass and reticular opacities with mild bronchiectasis   in both lungs may be sequela of atypical infection or related to chronic   interstitial disease given history of SLE.    < end of copied text >

## 2023-06-24 NOTE — PHYSICAL THERAPY INITIAL EVALUATION ADULT - ADDITIONAL COMMENTS
Pt states she lives in a house with her family and has 7-8 steps to enter +1 flight inside. Prior to admission, pt needed a little standby assistance at times from family, without any assistive device. Pt has home O2 on 3L NC. Had 1 session of Home PT.  Post PT evaluation, pt left seated at edge of bed, alarm on, call bell and remote within reach, all precautions maintained, NAD. RN aware.

## 2023-06-24 NOTE — PHYSICAL THERAPY INITIAL EVALUATION ADULT - PERTINENT HX OF CURRENT PROBLEM, REHAB EVAL
58F lupus, DM2, recently diagnosed thymoma p/w shortness of breath. Patient was recently admitted twice to ([facility]) for shortness of breath since May. She was following up with her pulmonologist who recommended for pt to come in. Patient’s ongoing complaints have been of shortness of breath. This started earlier this year and worsened her initial admission to [facility] when she was diagnosed with COVID. Patient also has a history of lupus, diagnosed >10 years ago. She is suppose to be on Plaquenil, but since onset of COVID pandemic, has not followed with a rheumatologist so is no longer taking any lupus medications. Has central chest pain with cough. Cough has clear/white sputum. First fever was in the ED to 102. Pt is also on ?oxygen at home. States shes on 3L at rest. Denies any urinary pain, burning, or frequency.

## 2023-06-24 NOTE — PHYSICAL THERAPY INITIAL EVALUATION ADULT - GENERAL OBSERVATIONS, REHAB EVAL
Pt received seated at edge of bed, with all lines intact, NAD, all precautions maintained. BP: 134/80, HR: 109, SpO2: 97% on 4L NC

## 2023-06-24 NOTE — PHYSICAL THERAPY INITIAL EVALUATION ADULT - IMPAIRMENTS FOUND, PT EVAL
[place](s) - size and tension/Cranial shape aerobic capacity/endurance/gait, locomotion, and balance/muscle strength/ROM

## 2023-06-24 NOTE — PROGRESS NOTE ADULT - ASSESSMENT
INCOMPLETE - TO BE DISCUSSED W/ ATTENDING  58-year-old F w/PMH of SLE?, thymoma presented with SOB and fever. Patient was previously admission in [facility] due to current symptoms. Rheumatology was consulted for further management     #Acute respiratory failure   -Patient w/previous 2 admission to [facility] within a month due to rapid progression of shortness of breath, and was recently DC w/new ncO2 3L  -Patient was tested +ve COVID 19 on 5/12/23   -s/p bronchoscopy in [facility] and mediastinal bx which showed thymoma  -Readmitted in our hospital with fever, and SOB   -Procalcitonin was unremarkable   -CT chest showed severe nonspecific GGO and reticular interstitial opacities in both lungs with mild bronchiectasis.  Differential dx:  patient has a rapidly progressive lung disease w/fever which concern for possible MDA-5 disease although does not have muscle weakness and skin rash, other possible is antisynthetase (does not have the  hands rash) Mayra Montero et al. "Recognition and management of myositis-associated rapidly progressive interstitial lung disease." Chest 158.1 (2020): 252-263.. Acute interstitial pneumonitis witch recent COVID19 infection, and other causes of ARDS. Unlikely bacterial infection having negative procalcitonin.   -started on solumedrol 60 (6/23-TBD)  -6/24: says she feels 50% improved in symptoms since starting nebulizer and steroids. Able to have conversation w/ improvement in sob. Also can walk a bit more w/ improved sob. Palpitations and chest tightness also improved.     Serology: ferritin 1924, c3/c4/hepb/c wnl    Plan:    -c/w solumedrol 60 mg     -f/u MDA5 antibody, anti-Jo1, myomarker panel   -f/u lupus serology: dsDNA, ANTOINE, Sjogren, quantiferon   -f/u neuro and pulmonary recs  INCOMPLETE - TO BE DISCUSSED W/ ATTENDING 58-year-old F w/PMH of SLE?, thymoma presented with SOB and fever. Patient was previously admission in [facility] due to current symptoms. Rheumatology was consulted for further management     #Acute respiratory failure   -Patient w/previous 2 admission to [facility] within a month due to rapid progression of shortness of breath, and was recently DC w/new ncO2 3L  -Patient was tested +ve COVID 19 on 5/12/23   -s/p bronchoscopy in [facility] and mediastinal bx which showed thymoma  -Readmitted in our hospital with fever, and SOB   -Procalcitonin was unremarkable   -CT chest showed severe nonspecific GGO and reticular interstitial opacities in both lungs with mild bronchiectasis.  Differential dx:  patient has a rapidly progressive lung disease w/fever which concern for possible MDA-5 disease although does not have muscle weakness and skin rash, other possible is antisynthetase (does not have the  hands rash) Mayra Montero et al. "Recognition and management of myositis-associated rapidly progressive interstitial lung disease." Chest 158.1 (2020): 252-263.. Acute interstitial pneumonitis witch recent COVID19 infection, and other causes of ARDS. Unlikely bacterial infection having negative procalcitonin.   -started on solumedrol 60 (6/23-TBD)  -6/24: says she feels 50% improved in symptoms since starting nebulizer and steroids. Able to have conversation w/ improvement in sob. Also can walk a bit more w/ improved sob. Palpitations and chest tightness also improved.     Serology: ferritin 1924, c3/c4/hepb/c wnl    Plan:    -c/w solumedrol 60 mg     -f/u MDA5 antibody, anti-Jo1, myomarker panel   -f/u lupus serology: dsDNA, ANTOINE, Sjogren, quantiferon   -f/u neuro and pulmonary recs     Discussed with Attending Dr. Fabrizio Alvarado,   Rheumatology Fellow  Pager: [phone]  Available on TEAMS

## 2023-06-24 NOTE — PROGRESS NOTE ADULT - PROBLEM SELECTOR PLAN 1
- pt has been admitted twice to [facility] for shortness of breath since May  - CTA scan with no PE, but 3.9 x 4.9 cm mediastinal mass corresponding to known thymoma. Nonspecific groundglass and reticular opacities with mild bronchiectasis in both lungs may be sequela of atypical infection or related to chronic interstitial disease given history of SLE.  - differential includes bacterial PNA, viral PNA, lupus ILD flare   - per patient she is on home oxygen at 3L   - need records from [facility]   - c/w Xopenex ATC   - on Solumedrol 60mg daily   - started on Mucomyst, incentive spirometry    - c/w antibiotics as below  - aerobika for airway clearance - pt has been admitted twice to [facility] for shortness of breath since May  - CTA scan with no PE, but 3.9 x 4.9 cm mediastinal mass corresponding to known thymoma. Nonspecific groundglass and reticular opacities with mild bronchiectasis in both lungs may be sequela of atypical infection or related to chronic interstitial disease given history of SLE.  - differential includes bacterial PNA, viral PNA, lupus ILD flare   - per patient she is on home oxygen at 3L   - need records from [facility]   - c/w Xopenex ATC   - on Solumedrol 60mg daily   - started on Mucomyst, incentive spirometry    - c/w antibiotics as below  - aerobika for airway clearance  - check TTE

## 2023-06-24 NOTE — CONSULT NOTE ADULT - ATTENDING COMMENTS
No new symptoms or signs for MG exacerbation. During my evaluation patient's sister was present.    I discussed the case with the Resident and agree with the findings and plan as documented in the Resident's note unless noted below.  Send serum Ach Rec Ab (binding, blocking, modulating),  anti-LRP4 Ab, voltage gated CA channel Ab  Continue medical management, neuro- check and fall precaution.  GI and DVT prophylaxis.  I discussed the diagnosis, and treatment plan with the patient and sister  All questions and concerns were addressed.  If you have any further questions, please do not hesitate to contact our team.  Thank you for allowing us to participate in this patient care.

## 2023-06-24 NOTE — CONSULT NOTE ADULT - ASSESSMENT
57 yo female adm for SLE on high-dose steroids with marked hyperglycemia. Pt was recently dx'ed with Type 2 DM on adm to [facility] for pulm issues, discharged on low-dose basal bolus. Has seen Dr Dominguez, insulin increased moderately but BS remained consistently elevated, even prior to adding steroids A1c 11.1  Recommend :  increase Lantus to 25 units  increase premeal lispro to 10 units . Steroids classically require higher prandial insulin doses vs usual Type 2 DM  I have deferred any recommendations concerning discharge care pending initial response.  Pt advised of risk of steroid-induced osteoporosis, recommend bone density as o/p

## 2023-06-25 DIAGNOSIS — R73.9 HYPERGLYCEMIA, UNSPECIFIED: ICD-10-CM

## 2023-06-25 DIAGNOSIS — I10 ESSENTIAL (PRIMARY) HYPERTENSION: ICD-10-CM

## 2023-06-25 DIAGNOSIS — E78.5 HYPERLIPIDEMIA, UNSPECIFIED: ICD-10-CM

## 2023-06-25 LAB
ANION GAP SERPL CALC-SCNC: 19 MMOL/L — HIGH (ref 7–14)
B-OH-BUTYR SERPL-SCNC: <0 MMOL/L — SIGNIFICANT CHANGE UP (ref 0–0.4)
BUN SERPL-MCNC: 12 MG/DL — SIGNIFICANT CHANGE UP (ref 7–23)
CALCIUM SERPL-MCNC: 8.9 MG/DL — SIGNIFICANT CHANGE UP (ref 8.4–10.5)
CHLORIDE SERPL-SCNC: 96 MMOL/L — LOW (ref 98–107)
CO2 SERPL-SCNC: 25 MMOL/L — SIGNIFICANT CHANGE UP (ref 22–31)
CREAT SERPL-MCNC: 0.54 MG/DL — SIGNIFICANT CHANGE UP (ref 0.5–1.3)
EGFR: 107 ML/MIN/1.73M2 — SIGNIFICANT CHANGE UP
GLUCOSE BLDC GLUCOMTR-MCNC: 115 MG/DL — HIGH (ref 70–99)
GLUCOSE BLDC GLUCOMTR-MCNC: 116 MG/DL — HIGH (ref 70–99)
GLUCOSE BLDC GLUCOMTR-MCNC: 205 MG/DL — HIGH (ref 70–99)
GLUCOSE BLDC GLUCOMTR-MCNC: 280 MG/DL — HIGH (ref 70–99)
GLUCOSE SERPL-MCNC: 156 MG/DL — HIGH (ref 70–99)
HCT VFR BLD CALC: 24.6 % — LOW (ref 34.5–45)
HGB BLD-MCNC: 7.4 G/DL — LOW (ref 11.5–15.5)
MAGNESIUM SERPL-MCNC: 2.1 MG/DL — SIGNIFICANT CHANGE UP (ref 1.6–2.6)
MCHC RBC-ENTMCNC: 17.7 PG — LOW (ref 27–34)
MCHC RBC-ENTMCNC: 30.1 GM/DL — LOW (ref 32–36)
MCV RBC AUTO: 59 FL — LOW (ref 80–100)
NRBC # BLD: 0 /100 WBCS — SIGNIFICANT CHANGE UP (ref 0–0)
NRBC # FLD: 0.02 K/UL — HIGH (ref 0–0)
PHOSPHATE SERPL-MCNC: 3.5 MG/DL — SIGNIFICANT CHANGE UP (ref 2.5–4.5)
PLATELET # BLD AUTO: 448 K/UL — HIGH (ref 150–400)
POTASSIUM SERPL-MCNC: 3.5 MMOL/L — SIGNIFICANT CHANGE UP (ref 3.5–5.3)
POTASSIUM SERPL-SCNC: 3.5 MMOL/L — SIGNIFICANT CHANGE UP (ref 3.5–5.3)
RBC # BLD: 4.17 M/UL — SIGNIFICANT CHANGE UP (ref 3.8–5.2)
RBC # FLD: 20.5 % — HIGH (ref 10.3–14.5)
SODIUM SERPL-SCNC: 140 MMOL/L — SIGNIFICANT CHANGE UP (ref 135–145)
VANCOMYCIN TROUGH SERPL-MCNC: 6.9 UG/ML — LOW (ref 10–20)
WBC # BLD: 9.83 K/UL — SIGNIFICANT CHANGE UP (ref 3.8–10.5)
WBC # FLD AUTO: 9.83 K/UL — SIGNIFICANT CHANGE UP (ref 3.8–10.5)

## 2023-06-25 PROCEDURE — 99233 SBSQ HOSP IP/OBS HIGH 50: CPT

## 2023-06-25 PROCEDURE — 99232 SBSQ HOSP IP/OBS MODERATE 35: CPT

## 2023-06-25 RX ORDER — INSULIN LISPRO 100/ML
15 VIAL (ML) SUBCUTANEOUS
Refills: 0 | Status: DISCONTINUED | OUTPATIENT
Start: 2023-06-25 | End: 2023-06-29

## 2023-06-25 RX ORDER — INSULIN LISPRO 100/ML
VIAL (ML) SUBCUTANEOUS AT BEDTIME
Refills: 0 | Status: DISCONTINUED | OUTPATIENT
Start: 2023-06-25 | End: 2023-06-30

## 2023-06-25 RX ORDER — VANCOMYCIN HCL 1 G
1000 VIAL (EA) INTRAVENOUS EVERY 8 HOURS
Refills: 0 | Status: DISCONTINUED | OUTPATIENT
Start: 2023-06-25 | End: 2023-06-25

## 2023-06-25 RX ORDER — INSULIN GLARGINE 100 [IU]/ML
20 INJECTION, SOLUTION SUBCUTANEOUS AT BEDTIME
Refills: 0 | Status: DISCONTINUED | OUTPATIENT
Start: 2023-06-25 | End: 2023-06-28

## 2023-06-25 RX ADMIN — Medication 250 MILLIGRAM(S): at 09:53

## 2023-06-25 RX ADMIN — Medication 6: at 12:46

## 2023-06-25 RX ADMIN — Medication 15 UNIT(S): at 17:58

## 2023-06-25 RX ADMIN — PIPERACILLIN AND TAZOBACTAM 25 GRAM(S): 4; .5 INJECTION, POWDER, LYOPHILIZED, FOR SOLUTION INTRAVENOUS at 21:45

## 2023-06-25 RX ADMIN — INSULIN GLARGINE 20 UNIT(S): 100 INJECTION, SOLUTION SUBCUTANEOUS at 22:49

## 2023-06-25 RX ADMIN — Medication 4 MILLILITER(S): at 08:42

## 2023-06-25 RX ADMIN — Medication 10 UNIT(S): at 12:50

## 2023-06-25 RX ADMIN — LEVALBUTEROL 0.63 MILLIGRAM(S): 1.25 SOLUTION, CONCENTRATE RESPIRATORY (INHALATION) at 22:17

## 2023-06-25 RX ADMIN — LEVALBUTEROL 0.63 MILLIGRAM(S): 1.25 SOLUTION, CONCENTRATE RESPIRATORY (INHALATION) at 15:00

## 2023-06-25 RX ADMIN — PIPERACILLIN AND TAZOBACTAM 25 GRAM(S): 4; .5 INJECTION, POWDER, LYOPHILIZED, FOR SOLUTION INTRAVENOUS at 05:15

## 2023-06-25 RX ADMIN — LEVALBUTEROL 0.63 MILLIGRAM(S): 1.25 SOLUTION, CONCENTRATE RESPIRATORY (INHALATION) at 08:42

## 2023-06-25 RX ADMIN — LEVALBUTEROL 0.63 MILLIGRAM(S): 1.25 SOLUTION, CONCENTRATE RESPIRATORY (INHALATION) at 03:55

## 2023-06-25 RX ADMIN — ENOXAPARIN SODIUM 40 MILLIGRAM(S): 100 INJECTION SUBCUTANEOUS at 17:52

## 2023-06-25 RX ADMIN — Medication 60 MILLIGRAM(S): at 05:19

## 2023-06-25 RX ADMIN — Medication 4 MILLILITER(S): at 15:00

## 2023-06-25 RX ADMIN — Medication 4 MILLILITER(S): at 22:17

## 2023-06-25 RX ADMIN — Medication 1 MILLIGRAM(S): at 12:47

## 2023-06-25 RX ADMIN — PIPERACILLIN AND TAZOBACTAM 25 GRAM(S): 4; .5 INJECTION, POWDER, LYOPHILIZED, FOR SOLUTION INTRAVENOUS at 14:34

## 2023-06-25 RX ADMIN — Medication 10 UNIT(S): at 09:06

## 2023-06-25 RX ADMIN — Medication 4: at 09:05

## 2023-06-25 NOTE — DIETITIAN INITIAL EVALUATION ADULT - PERTINENT MEDS FT
MEDICATIONS  (STANDING):  acetylcysteine 10%  Inhalation 4 milliLiter(s) Inhalation every 8 hours  dextrose 5%. 1000 milliLiter(s) (50 mL/Hr) IV Continuous <Continuous>  dextrose 50% Injectable 25 Gram(s) IV Push once  enoxaparin Injectable 40 milliGRAM(s) SubCutaneous every 24 hours  folic acid 1 milliGRAM(s) Oral daily  glucagon  Injectable 1 milliGRAM(s) IntraMuscular once  insulin glargine Injectable (LANTUS) 25 Unit(s) SubCutaneous at bedtime  insulin lispro (ADMELOG) corrective regimen sliding scale   SubCutaneous three times a day before meals  insulin lispro Injectable (ADMELOG) 10 Unit(s) SubCutaneous three times a day before meals  levalbuterol Inhalation 0.63 milliGRAM(s) Inhalation every 6 hours  methylPREDNISolone sodium succinate Injectable 60 milliGRAM(s) IV Push daily  piperacillin/tazobactam IVPB.. 3.375 Gram(s) IV Intermittent every 8 hours    MEDICATIONS  (PRN):  acetaminophen     Tablet .. 650 milliGRAM(s) Oral every 6 hours PRN Temp greater or equal to 38C (100.4F), Mild Pain (1 - 3)  aluminum hydroxide/magnesium hydroxide/simethicone Suspension 30 milliLiter(s) Oral every 4 hours PRN Dyspepsia  dextrose Oral Gel 15 Gram(s) Oral once PRN Blood Glucose LESS THAN 70 milliGRAM(s)/deciliter  guaiFENesin  milliGRAM(s) Oral every 12 hours PRN Cough  melatonin 3 milliGRAM(s) Oral at bedtime PRN Insomnia  ondansetron Injectable 4 milliGRAM(s) IV Push every 8 hours PRN Nausea and/or Vomiting

## 2023-06-25 NOTE — DIETITIAN INITIAL EVALUATION ADULT - RD TO REMAIN AVAILABLE
Medication Requested:  amphetamine-dextroamphetamine XR (ADDERALL XR) 10 MG 24 hr capsule    Last Rx written: 3-10-22  Last Rx dispensed (per PDMP): sold 3-9-22 from 3-8-22 script    Denied, script on file from 3-10-22 at current pharmacy  Writer called [facility] to verify    Writer called pt relaying message        
Medication:    Outpatient Medications Marked as Taking for the 3/30/22 encounter (Refill) with Rickie Smith MD   Medication Sig Dispense Refill   • amphetamine-dextroamphetamine XR (ADDERALL XR) 10 MG 24 hr capsule Take 1 capsule by mouth 2 times daily. Indications: Attention Deficit Hyperactivity Disorder 60 capsule 0       Message to Prescriber:     [x] Pharmacy has been verified.    [] Patient completely out of medication (*Route encounter as high priority if checked)    [x] Patient informed refill request can take up to 5 business days to be processed    Patient currently has follow up appointment scheduled      
yes

## 2023-06-25 NOTE — DIETITIAN INITIAL EVALUATION ADULT - PROBLEM SELECTOR PLAN 3
Vital Signs Reviewed  GEN: Comfortable, NAD, AAOx3  HEENT: NCAT, EOMI, MMM, Neck Supple, No C spine tenderness to palpation   RESP: CTAB, No rales/rhonchi/wheezing  CV: RRR, S1S2, No murmurs  ABD: No TTP, ND, No masses, No CVA Tenderness  Extrem/Skin: Equal pulses bilat, No cyanosis/edema/rashes  Neuro: No focal deficits. No midline spinal tenderness to palpation, normal tandem gait - history of lupus, diagnosed >10 years ago  - suppose to be on Plaquenil, but since onset of COVID pandemic for 3-4 years, has not followed with a rheumatologist - currently on no lupus medications  - f/u AI, complements, DS DNA, ESR, CRP   - if abnormal, consult rheum and pulm

## 2023-06-25 NOTE — PROGRESS NOTE ADULT - PROBLEM SELECTOR PLAN 1
- pt has been admitted twice to [facility] for shortness of breath since May  - CTA scan with no PE, but 3.9 x 4.9 cm mediastinal mass corresponding to known thymoma. Nonspecific groundglass and reticular opacities with mild bronchiectasis in both lungs may be sequela of atypical infection or related to chronic interstitial disease given history of SLE.  - differential is broad   - per patient she is on home oxygen at 3L   - need records from [facility], sister to bring on Monday    - c/w Xopenex ATC   - on Solumedrol 60mg daily   - started on Mucomyst, incentive spirometry    - c/w antibiotics as below  - aerobika for airway clearance  - check TTE

## 2023-06-25 NOTE — DIETITIAN INITIAL EVALUATION ADULT - ETIOLOGY
related to uncontrolled DM in setting of endocrine disorder/steroid induced hyperglycemia  Increased physiological demand for nutrients

## 2023-06-25 NOTE — PROGRESS NOTE ADULT - SUBJECTIVE AND OBJECTIVE BOX
Chief Complaint: DM 2 with hyperglycemia exacerbated by steroids     History: Patient seen at bedside. Reports she is feeling better, on supplemental 02 for SOB  Remains on Solumedrol 60 mg IVP daily  Is tolerating meals, no nausea/vomiting, no hypoglycemia    Reviewed outpatient management of diabetes:  Patient reports she is on long acting insulin once daily and rapid acting insulin before meals (per chart, Lantus and Admelog)  Was on Lantus 12 units daily and Admelog 3 units TID pre-meal, recently seen by outpatient endocrinologist Dr. Misty Dominguez ([facility]), doses were adjusted to Lantus 15 units and Admelog 5 units TID; however came to hospital 2 days later so did not have much time at home on these dosages   Here with hyperglycemia exacerbated by steroids     MEDICATIONS  (STANDING):  acetylcysteine 10%  Inhalation 4 milliLiter(s) Inhalation every 8 hours  dextrose 5%. 1000 milliLiter(s) (50 mL/Hr) IV Continuous <Continuous>  dextrose 50% Injectable 25 Gram(s) IV Push once  enoxaparin Injectable 40 milliGRAM(s) SubCutaneous every 24 hours  folic acid 1 milliGRAM(s) Oral daily  glucagon  Injectable 1 milliGRAM(s) IntraMuscular once  insulin glargine Injectable (LANTUS) 25 Unit(s) SubCutaneous at bedtime  insulin lispro (ADMELOG) corrective regimen sliding scale   SubCutaneous three times a day before meals  insulin lispro Injectable (ADMELOG) 10 Unit(s) SubCutaneous three times a day before meals  levalbuterol Inhalation 0.63 milliGRAM(s) Inhalation every 6 hours  methylPREDNISolone sodium succinate Injectable 60 milliGRAM(s) IV Push daily  piperacillin/tazobactam IVPB.. 3.375 Gram(s) IV Intermittent every 8 hours    MEDICATIONS  (PRN):  acetaminophen     Tablet .. 650 milliGRAM(s) Oral every 6 hours PRN Temp greater or equal to 38C (100.4F), Mild Pain (1 - 3)  aluminum hydroxide/magnesium hydroxide/simethicone Suspension 30 milliLiter(s) Oral every 4 hours PRN Dyspepsia  dextrose Oral Gel 15 Gram(s) Oral once PRN Blood Glucose LESS THAN 70 milliGRAM(s)/deciliter  guaiFENesin  milliGRAM(s) Oral every 12 hours PRN Cough  melatonin 3 milliGRAM(s) Oral at bedtime PRN Insomnia  ondansetron Injectable 4 milliGRAM(s) IV Push every 8 hours PRN Nausea and/or Vomiting    Allergies  amoxicillin (Rash (Mild to Mod))    Review of Systems:  Cardiovascular: No chest pain  Respiratory: No SOB  GI: No nausea, vomiting  Endocrine: no hypoglycemia     PHYSICAL EXAM:  VITALS: T(C): 36.8 (06-25-23 @ 09:15)  T(F): 98.3 (06-25-23 @ 09:15), Max: 98.5 (06-24-23 @ 21:59)  HR: 115 (06-25-23 @ 09:33) (81 - 116)  BP: 119/64 (06-25-23 @ 09:15) (96/55 - 129/82)  RR:  (17 - 20)  SpO2:  (95% - 100%)  Wt(kg): --  GENERAL: NAD  EYES: No proptosis, anicteric  HEENT:  Atraumatic, Normocephalic  RESPIRATORY: unlabored respirations     CAPILLARY BLOOD GLUCOSE    POCT Blood Glucose.: 280 mg/dL (25 Jun 2023 12:30)  POCT Blood Glucose.: 205 mg/dL (25 Jun 2023 08:38)  POCT Blood Glucose.: 240 mg/dL (24 Jun 2023 22:43)  POCT Blood Glucose.: 338 mg/dL (24 Jun 2023 17:34)      06-25    140  |  96<L>  |  12  ----------------------------<  156<H>  3.5   |  25  |  0.54    eGFR: 107    Ca    8.9      06-25  Mg     2.10     06-25  Phos  3.5     06-25    TPro  6.4  /  Alb  3.1<L>  /  TBili  0.5  /  DBili  x   /  AST  47<H>  /  ALT  28  /  AlkPhos  123<H>  06-22      Thyroid Function Tests:  06-22 @ 19:35 TSH 1.03 FreeT4 -- T3 -- Anti TPO -- Anti Thyroglobulin Ab -- TSI --      Anion Gap: 19 mmol/L (06-25-23 @ 07:06)  Anion Gap: 16 mmol/L (06-24-23 @ 11:31)  Anion Gap: 18 mmol/L (06-24-23 @ 06:27)  Anion Gap: 12 mmol/L (06-23-23 @ 08:51)  Anion Gap: 14 mmol/L (06-22-23 @ 19:35)      Beta Hydroxy-Butyrate: <0.0 mmol/L (06-25-23 @ 07:06)      A1C with Estimated Average Glucose Result: 11.1 % (06-24-23 @ 06:27)      Diet, Regular:   Consistent Carbohydrate Evening Snack (CSTCHOSN)  DASH/TLC Sodium & Cholesterol Restricted (DASH) (06-24-23 @ 15:48)

## 2023-06-25 NOTE — DIETITIAN INITIAL EVALUATION ADULT - NS FNS DIET ORDER
Diet, Regular:   Consistent Carbohydrate {Evening Snack} (CSTCHOSN)  DASH/TLC {Sodium & Cholesterol Restricted} (DASH) (06-24-23 @ 15:48)

## 2023-06-25 NOTE — DIETITIAN INITIAL EVALUATION ADULT - ORAL INTAKE PTA/DIET HISTORY
Patient reports her appetite has been fair in general, consumes 2-3 meals daily. Follows Diabetic diet PTA.  Patient reports her appetite has been fair in general, consumes 2-3 meals daily. Follows Diabetic diet PTA. Pt reported her high glucose mainly contributes to recently started steroid treatment 1 month ago.

## 2023-06-25 NOTE — DIETITIAN INITIAL EVALUATION ADULT - OTHER INFO
Per chart review, 58F lupus, DM2, recently diagnosed thymoma p/w acute on chronic shortness of breath.     Patient reports her appetite remains the same in hospital. As per RN flow sheet, pt able to consume 51-75% meal completion. Denies chewing and swallowing difficulties. Denies any GI distress (nausea/vomiting/diarrhea/constipation.) Last BM today as per pt. Pt's labs notable with elevated POCT 205/240, A1C 11.1% , pt continues on IV steroid. Pt currently on Consistent Carbohydrate diet, and on insulin regimen.     Provided pt with handout Carbohydrate Counting for People with Diabetes. Discussed carbohydrate sources, carbohydrate portions, protein sources, mixed meals, and nutrition label reading. Stressed importance of balanced meals with adequate protein and fiber. Pt was informed of current A1c, goal A1c, and goal fingerstick range.

## 2023-06-25 NOTE — DIETITIAN INITIAL EVALUATION ADULT - PERTINENT LABORATORY DATA
06-25    140  |  96<L>  |  12  ----------------------------<  156<H>  3.5   |  25  |  0.54    Ca    8.9      25 Jun 2023 07:06  Phos  3.5     06-25  Mg     2.10     06-25    POCT Blood Glucose.: 280 mg/dL (06-25-23 @ 12:30)  A1C with Estimated Average Glucose Result: 11.1 % (06-24-23 @ 06:27)

## 2023-06-25 NOTE — PROGRESS NOTE ADULT - ASSESSMENT
57 yo female adm for SLE on high-dose steroids with marked hyperglycemia. Pt was recently diagnosed with Type 2 DM on adm to [facility] for pulm issues, discharged on low-dose basal bolus. Admitted with SOB and started on Solumedrol, endocrine consulted for uncontrolled DM 2 (A1c 11.1%) with steroid induced hyperglycemia     1. DM 2 with hyperglycemia   A1c 11.1%  Home Regimen: Lantus 15 units SQ qHS and Admelog 5 units TID before meals; recently adjusted from Lantus 12 and Admelog 3/3/3    While inpatient:  BG target 100-180 mg/dl  Continue Lantus 25 units SQ qHS   Increase Admelog to 15 units SQ TID before meals (Hold if NPO/skips meal)   Continue Admelog MODERATE dose correctional scale before meals  ADD Admelog moderate dose scale at bedtime  Consistent carbohydrate diet, RD consult  Check BG before meals and bedtime  Hypoglycemia protocol   Please adjust IV ABX in dextrose to normal saline formulations if possible    Discharge Plan:  Resume basal/bolus insulin PENS, dose TBD  Reviewed with patient the relationship between steroids and blood glucose/insulin dosing, if on steroids or steroid taper as outpatient, her insulin doses will need to be adjusted accordingly  Ensure patient has glucometer, glucose test strips, lancets, alcohol swabs and insulin PEN needles   Followup with PCP, endocrinology, podiatry and opthalmology as outpatient  Followup with prior endocrinologist Dr. Misty Dominguez, [facility]    2. Steroid induced hyperglycemia  Currently on Solumedrol 60 mg daily  Please keep endocrine informed regarding steroid plans or taper  Patient advised of risk of steroid-induced osteoporosis, recommend bone density as outpatient    3. HTN  BP target less than 130/80  BP within target, not on antihypertensives  Continue to monitor  Microalbumin/creat ratio as outpatient    4. HLD  LDL target less than 70  LDL resulted 117  Recommend statin if no contraindication    Ju Julien  Nurse Practitioner  Division of Endocrinology & Diabetes  In house pager #[phone]/long range pager #[phone]    If before 9AM or after 6PM, or on weekends/holidays, please call endocrine answering service for assistance ([phone]).  For nonurgent matters email [email or web address] for assistance.    59 yo female adm for SLE on high-dose steroids with marked hyperglycemia. Pt was recently diagnosed with Type 2 DM on adm to [facility] for pulm issues, discharged on low-dose basal bolus. Admitted with SOB and started on Solumedrol, endocrine consulted for uncontrolled DM 2 (A1c 11.1%) with steroid induced hyperglycemia     1. DM 2 with hyperglycemia   A1c 11.1%  Home Regimen: Lantus 15 units SQ qHS and Admelog 5 units TID before meals; recently adjusted from Lantus 12 and Admelog 3/3/3    While inpatient:  BG target 100-180 mg/dl  Continue Lantus 25 units SQ qHS   Increase Admelog to 15 units SQ TID before meals (Hold if NPO/skips meal)   Continue Admelog MODERATE dose correctional scale before meals  ADD Admelog moderate dose scale at bedtime  Consistent carbohydrate diet, RD consult  Check BG before meals and bedtime  Hypoglycemia protocol   Please adjust IV ABX in dextrose to normal saline formulations if possible    Discharge Plan:  Resume basal/bolus insulin PENS, dose TBD  Reviewed with patient the relationship between steroids and blood glucose/insulin dosing, if on steroids or steroid taper as outpatient, her insulin doses will need to be adjusted accordingly  Ensure patient has glucometer, glucose test strips, lancets, alcohol swabs and insulin PEN needles   Followup with PCP, endocrinology, podiatry and opthalmology as outpatient  Followup with prior endocrinologist Dr. Misty Dominguez, [facility]    2. Steroid induced hyperglycemia  Currently on Solumedrol 60 mg daily  Please keep endocrine informed regarding steroid plans or taper  Patient with hx of Lupus - unsure if chronic steroids or frequent steroid courses? Would not withdraw steroid treatment abruptly, risk for AI  Patient advised of risk of steroid-induced osteoporosis, recommend bone density as outpatient    3. HTN  BP target less than 130/80  BP within target, not on antihypertensives  Continue to monitor  Microalbumin/creat ratio as outpatient    4. HLD  LDL target less than 70  LDL resulted 117  Recommend statin if no contraindication    Ju Julien  Nurse Practitioner  Division of Endocrinology & Diabetes  In house pager #[phone]/long range pager #[phone]    If before 9AM or after 6PM, or on weekends/holidays, please call endocrine answering service for assistance ([phone]).  For nonurgent matters email [email or web address] for assistance.    59 yo female adm for SLE on high-dose steroids with marked hyperglycemia. Pt was recently diagnosed with Type 2 DM on adm to [facility] for pulm issues, discharged on low-dose basal bolus. Admitted with SOB and started on Solumedrol, endocrine consulted for uncontrolled DM 2 (A1c 11.1%) with steroid induced hyperglycemia     1. DM 2 with hyperglycemia   A1c 11.1%  Home Regimen: Lantus 15 units SQ qHS and Admelog 5 units TID before meals; recently adjusted from Lantus 12 and Admelog 3/3/3  Elevated AG noted with hyperglycemia; BHB negative today, no DKA    While inpatient:  BG target 100-180 mg/dl  Continue Lantus 25 units SQ qHS   Increase Admelog to 15 units SQ TID before meals (Hold if NPO/skips meal)   Continue Admelog MODERATE dose correctional scale before meals  ADD Admelog moderate dose scale at bedtime  Consistent carbohydrate diet, RD consult  Check BG before meals and bedtime  Hypoglycemia protocol   Please adjust IV ABX in dextrose to normal saline formulations if possible    Discharge Plan:  Resume basal/bolus insulin PENS, dose TBD  Reviewed with patient the relationship between steroids and blood glucose/insulin dosing, if on steroids or steroid taper as outpatient, her insulin doses will need to be adjusted accordingly  Ensure patient has glucometer, glucose test strips, lancets, alcohol swabs and insulin PEN needles   Followup with PCP, endocrinology, podiatry and opthalmology as outpatient  Followup with prior endocrinologist Dr. Misty Dominguez, [facility]    2. Steroid induced hyperglycemia  Currently on Solumedrol 60 mg daily  Please keep endocrine informed regarding steroid plans or taper  Patient with hx of Lupus - unsure if chronic steroids or frequent steroid courses? Would not withdraw steroid treatment abruptly, risk for AI  Patient advised of risk of steroid-induced osteoporosis, recommend bone density as outpatient    3. HTN  BP target less than 130/80  BP within target, not on antihypertensives  Continue to monitor  Microalbumin/creat ratio as outpatient    4. HLD  LDL target less than 70  LDL resulted 117  Recommend statin if no contraindication    Ju Julien  Nurse Practitioner  Division of Endocrinology & Diabetes  In house pager #[phone]/long range pager #[phone]    If before 9AM or after 6PM, or on weekends/holidays, please call endocrine answering service for assistance ([phone]).  For nonurgent matters email [email or web address] for assistance.

## 2023-06-25 NOTE — DIETITIAN INITIAL EVALUATION ADULT - PROBLEM SELECTOR PLAN 1
- pt has been admitted twice to  [facility] for shortness of breath since May  - now presents with tachypnea, febrile to 102 meeting sepsis criteria   - CTA scan with no PE, but 3.9 x 4.9 cm mediastinal mass corresponding to known thymoma. Nonspecific groundglass and reticular opacities with mild bronchiectasis in both lungs may be sequela of atypical infection or related to chronic interstitial disease given history of SLE.  - differential includes bacterial PNA, viral PNA, lupus ILD flare, long covid   - UA also positive   - Follow up blood, urine, and sputum cultures  - started on Mucomyst, incentive spirometry    - legionella/strep/MRSA PCR testing ordered   - start with levofloxacin given allergy to amoxicillin (of note: pt did tolerate Zosyn in ED)   - per patient she is on home oxygen at 3L   - need records from [facility]   - lupus labs ordered as below

## 2023-06-25 NOTE — DIETITIAN INITIAL EVALUATION ADULT - PROBLEM SELECTOR PLAN 2
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 She was discharged home with scheduled follow-up with CT surgery and oncology.  - will need records of prior workup from [facility]   - if lupus labs return positive or has a lack of improvement  may need further inpatient workup

## 2023-06-25 NOTE — PROGRESS NOTE ADULT - SUBJECTIVE AND OBJECTIVE BOX
PROGRESS NOTE:     Patient is a 58y old  Female who presents with a chief complaint of Shortness of Breath (24 Jun 2023 18:08)      SUBJECTIVE / OVERNIGHT EVENTS: Shortness of breath is better.     ADDITIONAL REVIEW OF SYSTEMS:    MEDICATIONS  (STANDING):  acetylcysteine 10%  Inhalation 4 milliLiter(s) Inhalation every 8 hours  dextrose 5%. 1000 milliLiter(s) (50 mL/Hr) IV Continuous <Continuous>  dextrose 50% Injectable 25 Gram(s) IV Push once  enoxaparin Injectable 40 milliGRAM(s) SubCutaneous every 24 hours  folic acid 1 milliGRAM(s) Oral daily  glucagon  Injectable 1 milliGRAM(s) IntraMuscular once  insulin glargine Injectable (LANTUS) 25 Unit(s) SubCutaneous at bedtime  insulin lispro (ADMELOG) corrective regimen sliding scale   SubCutaneous three times a day before meals  insulin lispro Injectable (ADMELOG) 10 Unit(s) SubCutaneous three times a day before meals  levalbuterol Inhalation 0.63 milliGRAM(s) Inhalation every 6 hours  methylPREDNISolone sodium succinate Injectable 60 milliGRAM(s) IV Push daily  piperacillin/tazobactam IVPB.. 3.375 Gram(s) IV Intermittent every 8 hours  vancomycin  IVPB 1000 milliGRAM(s) IV Intermittent every 8 hours    MEDICATIONS  (PRN):  acetaminophen     Tablet .. 650 milliGRAM(s) Oral every 6 hours PRN Temp greater or equal to 38C (100.4F), Mild Pain (1 - 3)  aluminum hydroxide/magnesium hydroxide/simethicone Suspension 30 milliLiter(s) Oral every 4 hours PRN Dyspepsia  dextrose Oral Gel 15 Gram(s) Oral once PRN Blood Glucose LESS THAN 70 milliGRAM(s)/deciliter  guaiFENesin  milliGRAM(s) Oral every 12 hours PRN Cough  melatonin 3 milliGRAM(s) Oral at bedtime PRN Insomnia  ondansetron Injectable 4 milliGRAM(s) IV Push every 8 hours PRN Nausea and/or Vomiting      CAPILLARY BLOOD GLUCOSE      POCT Blood Glucose.: 205 mg/dL (25 Jun 2023 08:38)  POCT Blood Glucose.: 240 mg/dL (24 Jun 2023 22:43)  POCT Blood Glucose.: 338 mg/dL (24 Jun 2023 17:34)  POCT Blood Glucose.: 320 mg/dL (24 Jun 2023 14:33)  POCT Blood Glucose.: 419 mg/dL (24 Jun 2023 12:41)  POCT Blood Glucose.: 407 mg/dL (24 Jun 2023 12:40)    I&O's Summary    24 Jun 2023 07:01  -  25 Jun 2023 07:00  --------------------------------------------------------  IN: 1000 mL / OUT: 180 mL / NET: 820 mL        PHYSICAL EXAM:  Vital Signs Last 24 Hrs  T(C): 36.8 (25 Jun 2023 09:15), Max: 36.9 (24 Jun 2023 21:59)  T(F): 98.3 (25 Jun 2023 09:15), Max: 98.5 (24 Jun 2023 21:59)  HR: 115 (25 Jun 2023 09:33) (81 - 116)  BP: 119/64 (25 Jun 2023 09:15) (96/55 - 129/82)  BP(mean): --  RR: 20 (25 Jun 2023 09:15) (17 - 20)  SpO2: 95% (25 Jun 2023 09:33) (95% - 100%)    Parameters below as of 25 Jun 2023 09:33  Patient On (Oxygen Delivery Method): nasal cannula      Constitutional: NAD, frail appearing   Eyes: anicteric sclera  Respiratory: decreased breath sounds throughout, crackles in the lung bases    Cardiac: +S1/S2; RRR; no M/R/G  Gastrointestinal: abdomen soft, non-distended, no rebound or guarding; +BSx4  Extremities: no peripheral edema, no cyanosis   Musculoskeletal: NROM x4; no joint swelling, tenderness or erythema  Vascular: 2+ radial, DP pulses B/L  Neurologic: AAOx3; no gross focal deficits    LABS:                        7.4    9.83  )-----------( 448      ( 25 Jun 2023 07:06 )             24.6     06-25    140  |  96<L>  |  12  ----------------------------<  156<H>  3.5   |  25  |  0.54    Ca    8.9      25 Jun 2023 07:06  Phos  3.5     06-25  Mg     2.10     06-25        CARDIAC MARKERS ( 24 Jun 2023 06:27 )  x     / x     / 13 U/L / x     / x          Urinalysis Basic - ( 25 Jun 2023 07:06 )    Color: x / Appearance: x / SG: x / pH: x  Gluc: 156 mg/dL / Ketone: x  / Bili: x / Urobili: x   Blood: x / Protein: x / Nitrite: x   Leuk Esterase: x / RBC: x / WBC x   Sq Epi: x / Non Sq Epi: x / Bacteria: x        Culture - Blood (collected 22 Jun 2023 23:53)  Source: .Blood Blood-Peripheral  Preliminary Report (24 Jun 2023 03:01):    No growth to date.    Culture - Urine (collected 22 Jun 2023 21:35)  Source: Clean Catch Clean Catch (Midstream)  Final Report (24 Jun 2023 18:52):    <10,000 CFU/mL Normal Urogenital Lizet    Culture - Blood (collected 22 Jun 2023 19:32)  Source: .Blood Blood-Peripheral  Preliminary Report (24 Jun 2023 01:02):    No growth to date.        RADIOLOGY & ADDITIONAL TESTS:  Results Reviewed:   Imaging Personally Reviewed:  Electrocardiogram Personally Reviewed:    COORDINATION OF CARE:  Care Discussed with Consultants/Other Providers [Y/N]:  Prior or Outpatient Records Reviewed [Y/N]:

## 2023-06-26 LAB
ANION GAP SERPL CALC-SCNC: 13 MMOL/L — SIGNIFICANT CHANGE UP (ref 7–14)
B PERT DNA SPEC QL NAA+PROBE: SIGNIFICANT CHANGE UP
B PERT+PARAPERT DNA PNL SPEC NAA+PROBE: SIGNIFICANT CHANGE UP
BLD GP AB SCN SERPL QL: NEGATIVE — SIGNIFICANT CHANGE UP
BORDETELLA PARAPERTUSSIS (RAPRVP): SIGNIFICANT CHANGE UP
BUN SERPL-MCNC: 10 MG/DL — SIGNIFICANT CHANGE UP (ref 7–23)
C PNEUM DNA SPEC QL NAA+PROBE: SIGNIFICANT CHANGE UP
CALCIUM SERPL-MCNC: 8.7 MG/DL — SIGNIFICANT CHANGE UP (ref 8.4–10.5)
CHLORIDE SERPL-SCNC: 99 MMOL/L — SIGNIFICANT CHANGE UP (ref 98–107)
CO2 SERPL-SCNC: 24 MMOL/L — SIGNIFICANT CHANGE UP (ref 22–31)
CREAT SERPL-MCNC: 0.58 MG/DL — SIGNIFICANT CHANGE UP (ref 0.5–1.3)
EGFR: 105 ML/MIN/1.73M2 — SIGNIFICANT CHANGE UP
FLUAV SUBTYP SPEC NAA+PROBE: SIGNIFICANT CHANGE UP
FLUBV RNA SPEC QL NAA+PROBE: SIGNIFICANT CHANGE UP
GLUCOSE BLDC GLUCOMTR-MCNC: 130 MG/DL — HIGH (ref 70–99)
GLUCOSE BLDC GLUCOMTR-MCNC: 168 MG/DL — HIGH (ref 70–99)
GLUCOSE BLDC GLUCOMTR-MCNC: 184 MG/DL — HIGH (ref 70–99)
GLUCOSE BLDC GLUCOMTR-MCNC: 191 MG/DL — HIGH (ref 70–99)
GLUCOSE SERPL-MCNC: 92 MG/DL — SIGNIFICANT CHANGE UP (ref 70–99)
HADV DNA SPEC QL NAA+PROBE: SIGNIFICANT CHANGE UP
HCOV 229E RNA SPEC QL NAA+PROBE: SIGNIFICANT CHANGE UP
HCOV HKU1 RNA SPEC QL NAA+PROBE: SIGNIFICANT CHANGE UP
HCOV NL63 RNA SPEC QL NAA+PROBE: SIGNIFICANT CHANGE UP
HCOV OC43 RNA SPEC QL NAA+PROBE: SIGNIFICANT CHANGE UP
HCT VFR BLD CALC: 24.1 % — LOW (ref 34.5–45)
HGB BLD-MCNC: 7.3 G/DL — LOW (ref 11.5–15.5)
HMPV RNA SPEC QL NAA+PROBE: SIGNIFICANT CHANGE UP
HPIV1 RNA SPEC QL NAA+PROBE: SIGNIFICANT CHANGE UP
HPIV2 RNA SPEC QL NAA+PROBE: SIGNIFICANT CHANGE UP
HPIV3 RNA SPEC QL NAA+PROBE: SIGNIFICANT CHANGE UP
HPIV4 RNA SPEC QL NAA+PROBE: SIGNIFICANT CHANGE UP
M PNEUMO DNA SPEC QL NAA+PROBE: SIGNIFICANT CHANGE UP
MAGNESIUM SERPL-MCNC: 2 MG/DL — SIGNIFICANT CHANGE UP (ref 1.6–2.6)
MCHC RBC-ENTMCNC: 17.3 PG — LOW (ref 27–34)
MCHC RBC-ENTMCNC: 30.3 GM/DL — LOW (ref 32–36)
MCV RBC AUTO: 57.2 FL — LOW (ref 80–100)
NRBC # BLD: 0 /100 WBCS — SIGNIFICANT CHANGE UP (ref 0–0)
NRBC # FLD: 0.06 K/UL — HIGH (ref 0–0)
PHOSPHATE SERPL-MCNC: 3.5 MG/DL — SIGNIFICANT CHANGE UP (ref 2.5–4.5)
PLATELET # BLD AUTO: 492 K/UL — HIGH (ref 150–400)
POTASSIUM SERPL-MCNC: 3.2 MMOL/L — LOW (ref 3.5–5.3)
POTASSIUM SERPL-SCNC: 3.2 MMOL/L — LOW (ref 3.5–5.3)
RAPID RVP RESULT: DETECTED
RBC # BLD: 4.21 M/UL — SIGNIFICANT CHANGE UP (ref 3.8–5.2)
RBC # FLD: 20.7 % — HIGH (ref 10.3–14.5)
RH IG SCN BLD-IMP: POSITIVE — SIGNIFICANT CHANGE UP
RSV RNA SPEC QL NAA+PROBE: SIGNIFICANT CHANGE UP
RV+EV RNA SPEC QL NAA+PROBE: SIGNIFICANT CHANGE UP
SARS-COV-2 RNA SPEC QL NAA+PROBE: DETECTED
SODIUM SERPL-SCNC: 136 MMOL/L — SIGNIFICANT CHANGE UP (ref 135–145)
TOTAL HEM COMP BLD-ACNC: 79 U/ML — SIGNIFICANT CHANGE UP (ref 42–95)
WBC # BLD: 10.4 K/UL — SIGNIFICANT CHANGE UP (ref 3.8–10.5)
WBC # FLD AUTO: 10.4 K/UL — SIGNIFICANT CHANGE UP (ref 3.8–10.5)

## 2023-06-26 PROCEDURE — 99233 SBSQ HOSP IP/OBS HIGH 50: CPT

## 2023-06-26 PROCEDURE — 99232 SBSQ HOSP IP/OBS MODERATE 35: CPT

## 2023-06-26 PROCEDURE — 99233 SBSQ HOSP IP/OBS HIGH 50: CPT | Mod: GC

## 2023-06-26 PROCEDURE — 71045 X-RAY EXAM CHEST 1 VIEW: CPT | Mod: 26

## 2023-06-26 RX ORDER — REMDESIVIR 5 MG/ML
INJECTION INTRAVENOUS
Refills: 0 | Status: COMPLETED | OUTPATIENT
Start: 2023-06-26 | End: 2023-06-30

## 2023-06-26 RX ORDER — REMDESIVIR 5 MG/ML
100 INJECTION INTRAVENOUS EVERY 24 HOURS
Refills: 0 | Status: COMPLETED | OUTPATIENT
Start: 2023-06-27 | End: 2023-06-30

## 2023-06-26 RX ORDER — REMDESIVIR 5 MG/ML
200 INJECTION INTRAVENOUS EVERY 24 HOURS
Refills: 0 | Status: COMPLETED | OUTPATIENT
Start: 2023-06-26 | End: 2023-06-26

## 2023-06-26 RX ADMIN — Medication 15 UNIT(S): at 08:33

## 2023-06-26 RX ADMIN — Medication 1 MILLIGRAM(S): at 13:03

## 2023-06-26 RX ADMIN — Medication 2: at 08:32

## 2023-06-26 RX ADMIN — LEVALBUTEROL 0.63 MILLIGRAM(S): 1.25 SOLUTION, CONCENTRATE RESPIRATORY (INHALATION) at 09:37

## 2023-06-26 RX ADMIN — Medication 2: at 18:03

## 2023-06-26 RX ADMIN — Medication 650 MILLIGRAM(S): at 05:17

## 2023-06-26 RX ADMIN — Medication 4 MILLILITER(S): at 09:37

## 2023-06-26 RX ADMIN — Medication 600 MILLIGRAM(S): at 22:32

## 2023-06-26 RX ADMIN — PIPERACILLIN AND TAZOBACTAM 25 GRAM(S): 4; .5 INJECTION, POWDER, LYOPHILIZED, FOR SOLUTION INTRAVENOUS at 13:03

## 2023-06-26 RX ADMIN — Medication 15 UNIT(S): at 13:02

## 2023-06-26 RX ADMIN — ENOXAPARIN SODIUM 40 MILLIGRAM(S): 100 INJECTION SUBCUTANEOUS at 18:05

## 2023-06-26 RX ADMIN — PIPERACILLIN AND TAZOBACTAM 25 GRAM(S): 4; .5 INJECTION, POWDER, LYOPHILIZED, FOR SOLUTION INTRAVENOUS at 05:17

## 2023-06-26 RX ADMIN — Medication 2: at 13:01

## 2023-06-26 RX ADMIN — Medication 650 MILLIGRAM(S): at 06:10

## 2023-06-26 RX ADMIN — LEVALBUTEROL 0.63 MILLIGRAM(S): 1.25 SOLUTION, CONCENTRATE RESPIRATORY (INHALATION) at 03:51

## 2023-06-26 RX ADMIN — REMDESIVIR 200 MILLIGRAM(S): 5 INJECTION INTRAVENOUS at 18:06

## 2023-06-26 RX ADMIN — Medication 15 UNIT(S): at 18:05

## 2023-06-26 RX ADMIN — PIPERACILLIN AND TAZOBACTAM 25 GRAM(S): 4; .5 INJECTION, POWDER, LYOPHILIZED, FOR SOLUTION INTRAVENOUS at 22:34

## 2023-06-26 RX ADMIN — Medication 60 MILLIGRAM(S): at 05:18

## 2023-06-26 RX ADMIN — INSULIN GLARGINE 20 UNIT(S): 100 INJECTION, SOLUTION SUBCUTANEOUS at 22:32

## 2023-06-26 NOTE — PROGRESS NOTE ADULT - PROBLEM SELECTOR PLAN 1
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 Nonspecific groundglass and reticular opacities with mild bronchiectasis in both lungs may be sequela of atypical infection or related to chronic interstitial disease given history of SLE.  - per patient she is on home oxygen at 3L   - c/w Xopenex ATC   - on Solumedrol 60mg daily   - started on Mucomyst, incentive spirometry    - c/w antibiotics as below  - aerobika for airway clearance  - check TTE  now covid +, on steroids for poss rapidly progressive lung dz due to SLE; start remdes

## 2023-06-26 NOTE — PROGRESS NOTE ADULT - SUBJECTIVE AND OBJECTIVE BOX
Interval Events:  - remains on 3L  - sob improved, no cough  - T 100.7 overnight      REVIEW OF SYSTEMS:  Negative except as documented above.      OBJECTIVE:  ICU Vital Signs Last 24 Hrs  T(C): 37.1 (26 Jun 2023 06:10), Max: 38.2 (26 Jun 2023 05:13)  T(F): 98.7 (26 Jun 2023 06:10), Max: 100.7 (26 Jun 2023 05:13)  HR: 95 (26 Jun 2023 06:10) (90 - 117)  BP: 97/55 (26 Jun 2023 06:10) (97/55 - 132/61)  BP(mean): --  ABP: --  ABP(mean): --  RR: 22 (26 Jun 2023 06:10) (19 - 22)  SpO2: 98% (26 Jun 2023 06:10) (95% - 100%)    O2 Parameters below as of 26 Jun 2023 06:10  Patient On (Oxygen Delivery Method): nasal cannula  O2 Flow (L/min): 3            CAPILLARY BLOOD GLUCOSE      POCT Blood Glucose.: 115 mg/dL (25 Jun 2023 22:26)      PHYSICAL EXAM:  General: NAD  HEENT:  EOMI, sclera anicteric, moist mucus membranes  Neck: supple  Cardiovascular: RRR  Respiratory: b/l crackles, no wheezes  Abdomen: soft, nontender  Extremities: warm and well perfused, no edema, no clubbing  Skin: no rashes  Neurological: no focal deficits    HOSPITAL MEDICATIONS:  MEDICATIONS  (STANDING):  acetylcysteine 10%  Inhalation 4 milliLiter(s) Inhalation every 8 hours  dextrose 5%. 1000 milliLiter(s) (50 mL/Hr) IV Continuous <Continuous>  dextrose 50% Injectable 25 Gram(s) IV Push once  enoxaparin Injectable 40 milliGRAM(s) SubCutaneous every 24 hours  folic acid 1 milliGRAM(s) Oral daily  glucagon  Injectable 1 milliGRAM(s) IntraMuscular once  insulin glargine Injectable (LANTUS) 20 Unit(s) SubCutaneous at bedtime  insulin lispro (ADMELOG) corrective regimen sliding scale   SubCutaneous at bedtime  insulin lispro (ADMELOG) corrective regimen sliding scale   SubCutaneous three times a day before meals  insulin lispro Injectable (ADMELOG) 15 Unit(s) SubCutaneous three times a day before meals  levalbuterol Inhalation 0.63 milliGRAM(s) Inhalation every 6 hours  methylPREDNISolone sodium succinate Injectable 60 milliGRAM(s) IV Push daily  piperacillin/tazobactam IVPB.. 3.375 Gram(s) IV Intermittent every 8 hours    MEDICATIONS  (PRN):  acetaminophen     Tablet .. 650 milliGRAM(s) Oral every 6 hours PRN Temp greater or equal to 38C (100.4F), Mild Pain (1 - 3)  aluminum hydroxide/magnesium hydroxide/simethicone Suspension 30 milliLiter(s) Oral every 4 hours PRN Dyspepsia  dextrose Oral Gel 15 Gram(s) Oral once PRN Blood Glucose LESS THAN 70 milliGRAM(s)/deciliter  guaiFENesin  milliGRAM(s) Oral every 12 hours PRN Cough  melatonin 3 milliGRAM(s) Oral at bedtime PRN Insomnia  ondansetron Injectable 4 milliGRAM(s) IV Push every 8 hours PRN Nausea and/or Vomiting      LABS:                        7.3    10.40 )-----------( 492      ( 26 Jun 2023 05:35 )             24.1     Hgb Trend: 7.3<--, 7.4<--, 7.6<--, 8.5<--, 8.3<--  06-26    136  |  99  |  10  ----------------------------<  92  3.2<L>   |  24  |  0.58    Ca    8.7      26 Jun 2023 05:35  Phos  3.5     06-26  Mg     2.00     06-26      Creatinine Trend: 0.58<--, 0.54<--, 0.36<--, 0.41<--, 0.36<--, 0.46<--    Urinalysis Basic - ( 26 Jun 2023 05:35 )    Color: x / Appearance: x / SG: x / pH: x  Gluc: 92 mg/dL / Ketone: x  / Bili: x / Urobili: x   Blood: x / Protein: x / Nitrite: x   Leuk Esterase: x / RBC: x / WBC x   Sq Epi: x / Non Sq Epi: x / Bacteria: x        Venous Blood Gas:  06-24 @ 11:31  7.43/34/171/23/98.4  VBG Lactate: 2.8      MICROBIOLOGY:       RADIOLOGY:  [x] Reviewed and interpreted by me

## 2023-06-26 NOTE — PROGRESS NOTE ADULT - ASSESSMENT
57 yo female adm for SLE on high-dose steroids with marked hyperglycemia. Pt was recently diagnosed with Type 2 DM on adm to [facility] for pulm issues, discharged on low-dose basal bolus. Admitted with SOB and started on Solumedrol, endocrine consulted for uncontrolled DM 2 (A1c 11.1%) with steroid induced hyperglycemia     1. DM 2 with hyperglycemia   A1c 11.1%  Home Regimen: Lantus 15 units SQ qHS and Admelog 5 units TID before meals; recently adjusted from Lantus 12 and Admelog 3/3/3  Glucose has improved with insulin adjustments and AG now WDL    While inpatient:  BG target 100-180 mg/dl  Continue Lantus 20 units SQ qHS (follow fasting glucose data tomorrow, if serum below 100 mg/dL may need to decrease slightly)  Continue Admelog to 15 units SQ TID before meals (Hold if NPO/skips meal)   Continue Admelog MODERATE dose correctional scale before meals  Continue Admelog moderate dose scale at bedtime  Consistent carbohydrate diet, RD consult appreciated   Check BG before meals and bedtime  Hypoglycemia protocol   Please adjust IV ABX in dextrose to normal saline formulations if possible    Discharge Plan:  Resume basal/bolus insulin PENS, dose TBD  Reviewed with patient the relationship between steroids and blood glucose/insulin dosing, if on steroids or steroid taper as outpatient, her insulin doses will need to be adjusted accordingly  Ensure patient has glucometer, glucose test strips, lancets, alcohol swabs and insulin PEN needles   Followup with PCP, endocrinology, podiatry and opthalmology as outpatient  Followup with prior endocrinologist Dr. Misty Dominguez, [facility]    2. Steroid induced hyperglycemia  Currently on Solumedrol 60 mg daily  Please keep endocrine informed regarding steroid plans or taper  Patient with hx of Lupus - unsure if chronic steroids or frequent steroid courses? Would not withdraw steroid treatment abruptly, risk for AI  Patient advised of risk of steroid-induced osteoporosis, recommend bone density as outpatient    3. HTN  BP target less than 130/80  BP within target, not on antihypertensives  Continue to monitor  Microalbumin/creat ratio as outpatient    4. HLD  LDL target less than 70  LDL resulted 117  Recommend statin if no contraindication    ABHISHEK Mcmullen  Nurse Practitioner   Division of Endocrinology  Pager: [phone] pager [phone]    If out of hospital/unavailable when paged, please note: patient will be cared for by another provider on the endocrine service.  Please call the endocrine answering service for assistance to reach covering provider ([phone]). For non-urgent matters, please email [email or web address] for assistance.

## 2023-06-26 NOTE — PROGRESS NOTE ADULT - ASSESSMENT
58-year-old F w/PMH of SLE?, thymoma presented with SOB and fever. Patient was previously admission in [facility] due to current symptoms. Rheumatology was consulted for further management     #Acute respiratory failure -improving  -Patient w/previous 2 admission to [facility] within a month due to rapid progression of shortness of breath, and was recently DC w/new ncO2 3L  -Patient was tested +ve COVID 19 on 5/12/23 and retested +ve 6/26  -s/p bronchoscopy in [facility] and mediastinal bx which showed thymoma  -Readmitted in our hospital with fever, and SOB   -Procalcitonin was unremarkable   -CT chest showed severe nonspecific GGO and reticular interstitial opacities in both lungs with mild bronchiectasis.  Differential dx:  patient has a rapidly progressive lung disease w/fever which concern for possible MDA-5 disease although does not have muscle weakness and skin rash, other possible is antisynthetase (does not have the  hands rash) Mayra Montero et al. "Recognition and management of myositis-associated rapidly progressive interstitial lung disease." Chest 158.1 (2020): 252-263.. Acute interstitial pneumonitis witch recent COVID19 infection (now +ve COVID19), and other causes of ARDS. Unlikely bacterial infection having negative procalcitonin.   -started on solumedrol 60 (6/23-TBD)    #IgM low  -level of Immunoglobulin was checked and showed low IgM level <10   -Unclear if this a IgM selective immunodeficiency which can predispose for recurrent infection and autoimmune disease. (Max Sandy, and Armin Sandy. "Selective IgM deficiency—an underestimated primary immunodeficiency." Frontiers in immunology 8 (2017): 1056.)       Serology: ferritin 1924, c3/c4/hepb/c wnl, negative dsDNA    Plan:    -c/w solumedrol 60 mg     -f/u MDA5 antibody, anti-Jo1, myomarker panel   -f/u ANTOINE, Sjogren, quantiferon   -IgM low level in the lab. We may need to discuss with AI for further information   -Check for T cell subset   -discussed the case between Dr. Hoffmann and pulmonologist, Dr. Brown, and plan for convalescences plasma for COVID19    Discussed with Attending Dr. Tahira Kauffman MD  PGY-4 Rheumatology Fellow  Pager: [phone]   Available in teams

## 2023-06-26 NOTE — PROVIDER CONTACT NOTE (OTHER) - RECOMMENDATIONS
Pt. covered per sliding scale. recheck FS
F/u with provider.
Reorder labs needed for AM collection 6/24/23
F/u with provider.

## 2023-06-26 NOTE — PROGRESS NOTE ADULT - ASSESSMENT
57 yo F with PMH SLE (dx ?12 years ago, not on tx for past 5 years, initially w/ joint pain and pleural effusion), new dx thymoma, recent admission to [facility] x 2 for shortness of breath (initially with covid, then represented with continued shortness of breath) who presents with shortness of breath and abnormal CT chest.     # SLE  # b/l ground glass and reticular opacities  # chronic hypoxemic respiratory failure  # thymoma  Differential is broad and difficult to narrow without prior records. Currently, CT chest shows diffuse bilateral ground glass and reticular opacities. Presumably, these were not present during admission to [facility] as pt had bronchoscopy and mediastinal mass biopsy w/o lung biopsy (this probably would have been done if there were parenchymal abnormalities). However, she was discharged on 3L home O2 which is her current oxygen requirement. She has difficulty speaking in full sentences, though this seems to be due to choking sensation rather than sob.   It seems that she is having difficulty managing secretions and may have ptosis on exam, ?myasthenia.  Rheumatology concerned about MDA-5 disease in setting of rapidly progressive lung disease and fever.  - procal 0.09 -- low suspicion for pneumonia but will cover broadly for now   - BNP 43  - RVP negative  - CXR 6/22: b/l reticular nodular opacities  - CTA Chest 6/22: no PE, 3.9 x 4.9 cm mediastinal mass corresponding to known thymoma.  bilateral diffuse groundglass and reticular opacities with mild bronchiectasis        Plan:  - [place] [facility]  - c/w empiric abx  - duonebs q6  - please provide with aerobika (order as airway clearance device) to use w/ nebulized meds   - sputum cx  - f/u TTE  - swallow eval  - agree w/ steroids per rheum (solumedrol 60 qd)  - f/u MDA-5 ab, anti Glory, myomarker panel, dsDNA, ANTOINE, Sjogren, C3, C4, ferritin, myasthenia serologies  - f/u quantiferon   - please obtain records from [facility] (prior hospitalization notes, radiology reports, autoimmune labs  - immunoglobulins low (Igg, iga, and igm) - this is likely due to thymoma  - would treat covid with remdesivir, convalescent plasma given low immunoglobulin levels  57 yo F with PMH SLE (dx ?12 years ago, not on tx for past 5 years, initially w/ joint pain and pleural effusion), new dx thymoma, recent admission to [facility] x 2 for shortness of breath (initially with covid, then represented with continued shortness of breath) who presents with shortness of breath and abnormal CT chest.     # SLE  # b/l ground glass and reticular opacities  # chronic hypoxemic respiratory failure  # thymoma  Differential is broad and difficult to narrow without prior records. Currently, CT chest shows diffuse bilateral ground glass and reticular opacities. Presumably, these were not present during admission to [facility] as pt had bronchoscopy and mediastinal mass biopsy w/o lung biopsy (this probably would have been done if there were parenchymal abnormalities). However, she was discharged on 3L home O2 which is her current oxygen requirement. She has difficulty speaking in full sentences, though this seems to be due to choking sensation rather than sob.   It seems that she is having difficulty managing secretions and may have ptosis on exam, ?myasthenia.  Rheumatology concerned about MDA-5 disease in setting of rapidly progressive lung disease and fever.  - procal 0.09 -- low suspicion for pneumonia but will cover broadly for now   - BNP 43  - RVP negative  - CXR 6/22: b/l reticular nodular opacities  - CTA Chest 6/22: no PE, 3.9 x 4.9 cm mediastinal mass corresponding to known thymoma.  bilateral diffuse groundglass and reticular opacities with mild bronchiectasis        Plan:  - f/u [facility] records w/ prior CT/MRI for comparison  - c/w empiric abx (on zosyn)  - duonebs q6  - please provide with aerobika (order as airway clearance device) to use w/ nebulized meds   - sputum cx  - f/u TTE  - swallow eval  - agree w/ steroids per rheum (solumedrol 60 qd)  - f/u MDA-5 ab, anti Glory, myomarker panel, dsDNA, ANTOINE, Sjogren, C3, C4, ferritin, myasthenia serologies  - f/u quantiferon   - please obtain records from [facility] (prior hospitalization notes, radiology reports, autoimmune labs  - immunoglobulins low (Igg, iga, and igm) - this is likely due to thymoma  - would treat covid with remdesivir, convalescent plasma given low immunoglobulin levels   - f/u COVID spike and nucleocapsid ab.

## 2023-06-26 NOTE — PROGRESS NOTE ADULT - SUBJECTIVE AND OBJECTIVE BOX
Chief Complaint: DM 2 with hyperglycemia exacerbated by steroids     History: Patient seen at bedside. Reports she is feeling better, on supplemental 02 for SOB  Remains on Solumedrol 60 mg IVP daily  Is tolerating meals, no nausea/vomiting, no hypoglycemia    Outpatient management of diabetes:  Patient reports she is on long acting insulin once daily and rapid acting insulin before meals (per chart, Lantus and Admelog)  Was on Lantus 12 units daily and Admelog 3 units TID pre-meal, recently seen by outpatient endocrinologist Dr. Misty Dominguez ([facility]), doses were adjusted to Lantus 15 units and Admelog 5 units TID; however came to hospital 2 days later so did not have much time at home on these dosages   Here with hyperglycemia exacerbated by steroids   Serum glucose 92 vs fasting      MEDICATIONS  (STANDING):  dextrose 5%. 1000 milliLiter(s) (50 mL/Hr) IV Continuous <Continuous>  dextrose 50% Injectable 25 Gram(s) IV Push once  enoxaparin Injectable 40 milliGRAM(s) SubCutaneous every 24 hours  folic acid 1 milliGRAM(s) Oral daily  glucagon  Injectable 1 milliGRAM(s) IntraMuscular once  insulin glargine Injectable (LANTUS) 20 Unit(s) SubCutaneous at bedtime  insulin lispro (ADMELOG) corrective regimen sliding scale   SubCutaneous at bedtime  insulin lispro (ADMELOG) corrective regimen sliding scale   SubCutaneous three times a day before meals  insulin lispro Injectable (ADMELOG) 15 Unit(s) SubCutaneous three times a day before meals  methylPREDNISolone sodium succinate Injectable 60 milliGRAM(s) IV Push daily  piperacillin/tazobactam IVPB.. 3.375 Gram(s) IV Intermittent every 8 hours      Allergies  amoxicillin (Rash (Mild to Mod))    Review of Systems:  Cardiovascular: No chest pain  Respiratory: No SOB  GI: No nausea, vomiting  Endocrine: no hypoglycemia     PHYSICAL EXAM:  Vital Signs Last 24 Hrs  T(C): 36.8 (26 Jun 2023 11:31), Max: 38.2 (26 Jun 2023 05:13)  T(F): 98.3 (26 Jun 2023 11:31), Max: 100.7 (26 Jun 2023 05:13)  HR: 101 (26 Jun 2023 11:31) (90 - 117)  BP: 128/62 (26 Jun 2023 11:31) (97/55 - 132/61)  BP(mean): --  RR: 20 (26 Jun 2023 11:31) (19 - 22)  SpO2: 97% (26 Jun 2023 11:31) (95% - 100%)    Parameters below as of 26 Jun 2023 11:31  Patient On (Oxygen Delivery Method): nasal cannula      GENERAL: NAD  EYES: No proptosis, anicteric  HEENT:  Atraumatic, Normocephalic  RESPIRATORY: unlabored respirations     CAPILLARY BLOOD GLUCOSE      POCT Blood Glucose.: 184 mg/dL (26 Jun 2023 08:29)  POCT Blood Glucose.: 115 mg/dL (25 Jun 2023 22:26)  POCT Blood Glucose.: 116 mg/dL (25 Jun 2023 17:35)  POCT Blood Glucose.: 280 mg/dL (25 Jun 2023 12:30)    06-26    136  |  99  |  10  ----------------------------<  92  3.2<L>   |  24  |  0.58    Ca    8.7      26 Jun 2023 05:35  Phos  3.5     06-26  Mg     2.00     06-26      Thyroid Function Tests:  06-22 @ 19:35 TSH 1.03 FreeT4 -- T3 -- Anti TPO -- Anti Thyroglobulin Ab -- TSI --    Anion Gap: 13 mmol/L (06-26-23 @ 05:35)  Anion Gap: 19 mmol/L (06-25-23 @ 07:06)  Anion Gap: 16 mmol/L (06-24-23 @ 11:31)  Anion Gap: 18 mmol/L (06-24-23 @ 06:27)      Beta Hydroxy-Butyrate: <0.0 mmol/L (06-25-23 @ 07:06)      A1C with Estimated Average Glucose Result: 11.1 % (06-24-23 @ 06:27)    Diet, Regular:   Consistent Carbohydrate Evening Snack (CSTCHOSN)  DASH/TLC Sodium & Cholesterol Restricted (DASH) (06-24-23 @ 15:48)

## 2023-06-26 NOTE — PROGRESS NOTE ADULT - SUBJECTIVE AND OBJECTIVE BOX
[facility] Division of Hospital Medicine  Gabbie Olmedo MD  Pager [phone]    Patient is a 58y old  Female who presents with a chief complaint of Shortness of Breath      SUBJECTIVE / OVERNIGHT EVENTS: reports she is feeling a little better with her breathing but some usual things like using the bathroom create some SOB; coughing less      MEDICATIONS  (STANDING):  dextrose 5%. 1000 milliLiter(s) (50 mL/Hr) IV Continuous <Continuous>  dextrose 50% Injectable 25 Gram(s) IV Push once  enoxaparin Injectable 40 milliGRAM(s) SubCutaneous every 24 hours  folic acid 1 milliGRAM(s) Oral daily  glucagon  Injectable 1 milliGRAM(s) IntraMuscular once  insulin glargine Injectable (LANTUS) 20 Unit(s) SubCutaneous at bedtime  insulin lispro (ADMELOG) corrective regimen sliding scale   SubCutaneous at bedtime  insulin lispro (ADMELOG) corrective regimen sliding scale   SubCutaneous three times a day before meals  insulin lispro Injectable (ADMELOG) 15 Unit(s) SubCutaneous three times a day before meals  methylPREDNISolone sodium succinate Injectable 60 milliGRAM(s) IV Push daily  piperacillin/tazobactam IVPB.. 3.375 Gram(s) IV Intermittent every 8 hours    MEDICATIONS  (PRN):  acetaminophen     Tablet .. 650 milliGRAM(s) Oral every 6 hours PRN Temp greater or equal to 38C (100.4F), Mild Pain (1 - 3)  aluminum hydroxide/magnesium hydroxide/simethicone Suspension 30 milliLiter(s) Oral every 4 hours PRN Dyspepsia  dextrose Oral Gel 15 Gram(s) Oral once PRN Blood Glucose LESS THAN 70 milliGRAM(s)/deciliter  guaiFENesin  milliGRAM(s) Oral every 12 hours PRN Cough  melatonin 3 milliGRAM(s) Oral at bedtime PRN Insomnia  ondansetron Injectable 4 milliGRAM(s) IV Push every 8 hours PRN Nausea and/or Vomiting      CAPILLARY BLOOD GLUCOSE  POCT Blood Glucose.: 191 mg/dL (26 Jun 2023 12:55)  POCT Blood Glucose.: 184 mg/dL (26 Jun 2023 08:29)  POCT Blood Glucose.: 115 mg/dL (25 Jun 2023 22:26)  POCT Blood Glucose.: 116 mg/dL (25 Jun 2023 17:35)          PHYSICAL EXAM:  Vital Signs Last 24 Hrs  T(F): 98.3 (26 Jun 2023 11:31), Max: 100.7 (26 Jun 2023 05:13)  HR: 101 (26 Jun 2023 11:31) (90 - 117)  BP: 128/62 (26 Jun 2023 11:31) (97/55 - 132/61)  RR: 20 (26 Jun 2023 11:31) (19 - 22)  SpO2: 97% (26 Jun 2023 11:31) (95% - 100%)    Parameters below as of 26 Jun 2023 11:31  Patient On (Oxygen Delivery Method): nasal cannula        CONSTITUTIONAL: NAD, appears comfortable  EYES: PERRLA; conjunctiva and sclera clear  ENMT: Moist oral mucosa,; normal dentition  RESPIRATORY: Normal respiratory effort; b/l AE, no wheeze  CARDIOVASCULAR: Regular rate and rhythm; No lower extremity edema  ABDOMEN: Nontender to palpation, normoactive bowel sounds  MUSCULOSKELETAL: no clubbing or cyanosis of digits; no joint swelling or tenderness to palpation  PSYCH: A+O to person, place, and time; affect appropriate  NEUROLOGY: CN 2-12 are intact and symmetric; no gross sensory deficits   SKIN: No rashes; no palpable lesions    LABS:                        7.3    10.40 )-----------( 492      ( 26 Jun 2023 05:35 )             24.1     06-26    136  |  99  |  10  ----------------------------<  92  3.2<L>   |  24  |  0.58    Ca    8.7      26 Jun 2023 05:35  Phos  3.5     06-26  Mg     2.00     06-26            Urinalysis Basic - ( 26 Jun 2023 05:35 )    Color: x / Appearance: x / SG: x / pH: x  Gluc: 92 mg/dL / Ketone: x  / Bili: x / Urobili: x   Blood: x / Protein: x / Nitrite: x   Leuk Esterase: x / RBC: x / WBC x   Sq Epi: x / Non Sq Epi: x / Bacteria: x

## 2023-06-26 NOTE — PROGRESS NOTE ADULT - SUBJECTIVE AND OBJECTIVE BOX
OSMAN JOHN  [id number]    INTERVAL HPI/OVERNIGHT EVENTS:  Patient was seen at the bedside. She has significant improvement after steroid dose. Patient was tested +ve for COVID19 again.       PMHx/PSHx/FamHx/SocHx reviewed and no significant changes    REVIEW OF SYSTEMS   - reviewed with patient and  negative other than as above or previously documented.     MEDICATIONS  (STANDING):  dextrose 5%. 1000 milliLiter(s) (50 mL/Hr) IV Continuous <Continuous>  dextrose 50% Injectable 25 Gram(s) IV Push once  enoxaparin Injectable 40 milliGRAM(s) SubCutaneous every 24 hours  folic acid 1 milliGRAM(s) Oral daily  glucagon  Injectable 1 milliGRAM(s) IntraMuscular once  insulin glargine Injectable (LANTUS) 20 Unit(s) SubCutaneous at bedtime  insulin lispro (ADMELOG) corrective regimen sliding scale   SubCutaneous at bedtime  insulin lispro (ADMELOG) corrective regimen sliding scale   SubCutaneous three times a day before meals  insulin lispro Injectable (ADMELOG) 15 Unit(s) SubCutaneous three times a day before meals  methylPREDNISolone sodium succinate Injectable 60 milliGRAM(s) IV Push daily  piperacillin/tazobactam IVPB.. 3.375 Gram(s) IV Intermittent every 8 hours  remdesivir  IVPB   IV Intermittent   remdesivir  IVPB 200 milliGRAM(s) IV Intermittent every 24 hours    MEDICATIONS  (PRN):  acetaminophen     Tablet .. 650 milliGRAM(s) Oral every 6 hours PRN Temp greater or equal to 38C (100.4F), Mild Pain (1 - 3)  aluminum hydroxide/magnesium hydroxide/simethicone Suspension 30 milliLiter(s) Oral every 4 hours PRN Dyspepsia  dextrose Oral Gel 15 Gram(s) Oral once PRN Blood Glucose LESS THAN 70 milliGRAM(s)/deciliter  guaiFENesin  milliGRAM(s) Oral every 12 hours PRN Cough  melatonin 3 milliGRAM(s) Oral at bedtime PRN Insomnia  ondansetron Injectable 4 milliGRAM(s) IV Push every 8 hours PRN Nausea and/or Vomiting      Allergies    amoxicillin (Rash (Mild to Mod))    Intolerances          Vital Signs Last 24 Hrs  T(C): 36.8 (26 Jun 2023 11:31), Max: 38.2 (26 Jun 2023 05:13)  T(F): 98.3 (26 Jun 2023 11:31), Max: 100.7 (26 Jun 2023 05:13)  HR: 101 (26 Jun 2023 11:31) (90 - 117)  BP: 128/62 (26 Jun 2023 11:31) (97/55 - 132/61)  BP(mean): --  RR: 20 (26 Jun 2023 11:31) (19 - 22)  SpO2: 97% (26 Jun 2023 11:31) (95% - 100%)    Parameters below as of 26 Jun 2023 11:31  Patient On (Oxygen Delivery Method): nasal cannula        Physical Exam:  General: NAD  HEENT: EOMI, MMM  Cardio: +S1/S2, RRR  Resp: velcro sound in the R lung lower lobe, 3 L of ncO2  GI: +BS, soft, NT/ND  MSK: No sign of synovitis   Neuro: AAOx3  Psych: wnl    LABS:                        7.3    10.40 )-----------( 492      ( 26 Jun 2023 05:35 )             24.1     06-26    136  |  99  |  10  ----------------------------<  92  3.2<L>   |  24  |  0.58    Ca    8.7      26 Jun 2023 05:35  Phos  3.5     06-26  Mg     2.00     06-26        Urinalysis Basic - ( 26 Jun 2023 05:35 )    Color: x / Appearance: x / SG: x / pH: x  Gluc: 92 mg/dL / Ketone: x  / Bili: x / Urobili: x   Blood: x / Protein: x / Nitrite: x   Leuk Esterase: x / RBC: x / WBC x   Sq Epi: x / Non Sq Epi: x / Bacteria: x    SARS-CoV-2: DetectedKappa/Lambda Free Light Chain Ratio, Serum: 0.86 Ratio (06.24.23 @ 06:27) Quantitative IgM: <10 mg/dL (06.24.23 @ 06:27) Procalcitonin, Serum: 0.08C4 Complement, Serum: 52 mg/dL (06.23.23 @ 11:44) C3 Complement, Serum: 179 mg/dL (06.23.23 @ 11:44) Double Stranded DNA Antibody: <12Streptococcus pneumoniae Ag, Ur Result: Negative: Legionella Antigen, Urine: Negative: Positive Testing method:Protein/Creatinine Ratio Calculation: 0.4 Ratio (06.23.23 @ 15:09)       RADIOLOGY & ADDITIONAL TESTS:       OSMAN JOHN  [id number]    INTERVAL HPI/OVERNIGHT EVENTS:  Patient was seen at the bedside. She has significant improvement after steroid dose. Patient was tested +ve for COVID19 again.       PMHx/PSHx/FamHx/SocHx reviewed and no significant changes    REVIEW OF SYSTEMS   - reviewed with patient and  negative other than as above or previously documented.     MEDICATIONS  (STANDING):  dextrose 5%. 1000 milliLiter(s) (50 mL/Hr) IV Continuous <Continuous>  dextrose 50% Injectable 25 Gram(s) IV Push once  enoxaparin Injectable 40 milliGRAM(s) SubCutaneous every 24 hours  folic acid 1 milliGRAM(s) Oral daily  glucagon  Injectable 1 milliGRAM(s) IntraMuscular once  insulin glargine Injectable (LANTUS) 20 Unit(s) SubCutaneous at bedtime  insulin lispro (ADMELOG) corrective regimen sliding scale   SubCutaneous at bedtime  insulin lispro (ADMELOG) corrective regimen sliding scale   SubCutaneous three times a day before meals  insulin lispro Injectable (ADMELOG) 15 Unit(s) SubCutaneous three times a day before meals  methylPREDNISolone sodium succinate Injectable 60 milliGRAM(s) IV Push daily  piperacillin/tazobactam IVPB.. 3.375 Gram(s) IV Intermittent every 8 hours  remdesivir  IVPB   IV Intermittent   remdesivir  IVPB 200 milliGRAM(s) IV Intermittent every 24 hours    MEDICATIONS  (PRN):  acetaminophen     Tablet .. 650 milliGRAM(s) Oral every 6 hours PRN Temp greater or equal to 38C (100.4F), Mild Pain (1 - 3)  aluminum hydroxide/magnesium hydroxide/simethicone Suspension 30 milliLiter(s) Oral every 4 hours PRN Dyspepsia  dextrose Oral Gel 15 Gram(s) Oral once PRN Blood Glucose LESS THAN 70 milliGRAM(s)/deciliter  guaiFENesin  milliGRAM(s) Oral every 12 hours PRN Cough  melatonin 3 milliGRAM(s) Oral at bedtime PRN Insomnia  ondansetron Injectable 4 milliGRAM(s) IV Push every 8 hours PRN Nausea and/or Vomiting      Allergies    amoxicillin (Rash (Mild to Mod))    Intolerances          Vital Signs Last 24 Hrs  T(C): 36.8 (26 Jun 2023 11:31), Max: 38.2 (26 Jun 2023 05:13)  T(F): 98.3 (26 Jun 2023 11:31), Max: 100.7 (26 Jun 2023 05:13)  HR: 101 (26 Jun 2023 11:31) (90 - 117)  BP: 128/62 (26 Jun 2023 11:31) (97/55 - 132/61)  BP(mean): --  RR: 20 (26 Jun 2023 11:31) (19 - 22)  SpO2: 97% (26 Jun 2023 11:31) (95% - 100%)    Parameters below as of 26 Jun 2023 11:31  Patient On (Oxygen Delivery Method): nasal cannula        Physical Exam:  General: NAD  HEENT: EOMI, MMM  Cardio: +S1/S2, RRR  Resp: velcro sound in the R lung lower lobe, 3 L of ncO2  GI: +BS, soft, NT/ND  MSK: No sign of synovitis   Neuro: AAOx3  Psych: wnl    LABS:                        7.3    10.40 )-----------( 492      ( 26 Jun 2023 05:35 )             24.1     06-26    136  |  99  |  10  ----------------------------<  92  3.2<L>   |  24  |  0.58    Ca    8.7      26 Jun 2023 05:35  Phos  3.5     06-26  Mg     2.00     06-26        Urinalysis Basic - ( 26 Jun 2023 05:35 )    Color: x / Appearance: x / SG: x / pH: x  Gluc: 92 mg/dL / Ketone: x  / Bili: x / Urobili: x   Blood: x / Protein: x / Nitrite: x   Leuk Esterase: x / RBC: x / WBC x   Sq Epi: x / Non Sq Epi: x / Bacteria: x    SARS-CoV-2: DetectedKappa/Lambda Free Light Chain Ratio, Serum: 0.86 Ratio (06.24.23 @ 06:27) Quantitative IgM: <10 mg/dL (06.24.23 @ 06:27) Procalcitonin, Serum: 0.08C4 Complement, Serum: 52 mg/dL (06.23.23 @ 11:44) C3 Complement, Serum: 179 mg/dL (06.23.23 @ 11:44) Double Stranded DNA Antibody: <12Streptococcus pneumoniae Ag, Ur Result: Negative: Legionella Antigen, Urine: Negative: Positive Testing method: Protein/Creatinine Ratio Calculation: 0.4 Ratio (06.23.23 @ 15:09)       RADIOLOGY & ADDITIONAL TESTS:

## 2023-06-27 DIAGNOSIS — R77.1 ABNORMALITY OF GLOBULIN: ICD-10-CM

## 2023-06-27 DIAGNOSIS — D83.8 OTHER COMMON VARIABLE IMMUNODEFICIENCIES: ICD-10-CM

## 2023-06-27 DIAGNOSIS — U07.1 COVID-19: ICD-10-CM

## 2023-06-27 DIAGNOSIS — D80.1 NONFAMILIAL HYPOGAMMAGLOBULINEMIA: ICD-10-CM

## 2023-06-27 LAB
4/8 RATIO: 1.34 RATIO — SIGNIFICANT CHANGE UP (ref 0.9–3.6)
ABS CD8: 332 CELLS/UL — SIGNIFICANT CHANGE UP (ref 142–740)
ACRM BINDING ANTIBODY: <0.03 NMOL/L — SIGNIFICANT CHANGE UP (ref 0–0.24)
ALBUMIN SERPL ELPH-MCNC: 2.9 G/DL — LOW (ref 3.3–5)
ALP SERPL-CCNC: 89 U/L — SIGNIFICANT CHANGE UP (ref 40–120)
ALT FLD-CCNC: 15 U/L — SIGNIFICANT CHANGE UP (ref 4–33)
ANION GAP SERPL CALC-SCNC: 11 MMOL/L — SIGNIFICANT CHANGE UP (ref 7–14)
AST SERPL-CCNC: 16 U/L — SIGNIFICANT CHANGE UP (ref 4–32)
BILIRUB SERPL-MCNC: 0.5 MG/DL — SIGNIFICANT CHANGE UP (ref 0.2–1.2)
BLD GP AB SCN SERPL QL: NEGATIVE — SIGNIFICANT CHANGE UP
BUN SERPL-MCNC: 9 MG/DL — SIGNIFICANT CHANGE UP (ref 7–23)
CALCIUM SERPL-MCNC: 9 MG/DL — SIGNIFICANT CHANGE UP (ref 8.4–10.5)
CD16+CD56+ CELLS NFR BLD: 13 % — SIGNIFICANT CHANGE UP (ref 5–23)
CD16+CD56+ CELLS NFR SPEC: 128 CELLS/UL — SIGNIFICANT CHANGE UP (ref 71–410)
CD19 BLASTS SPEC-ACNC: 1 CELLS/UL — LOW (ref 84–469)
CD19 BLASTS SPEC-ACNC: <1 % — LOW (ref 6–24)
CD3 BLASTS SPEC-ACNC: 823 CELLS/UL — SIGNIFICANT CHANGE UP (ref 672–1870)
CD3 BLASTS SPEC-ACNC: 83 % — SIGNIFICANT CHANGE UP (ref 59–83)
CD4 %: 46 % — SIGNIFICANT CHANGE UP (ref 30–62)
CD8 %: 34 % — SIGNIFICANT CHANGE UP (ref 12–36)
CHLORIDE SERPL-SCNC: 100 MMOL/L — SIGNIFICANT CHANGE UP (ref 98–107)
CO2 SERPL-SCNC: 26 MMOL/L — SIGNIFICANT CHANGE UP (ref 22–31)
COVID-19 NUCLEOCAPSID GAM AB INTERP: NEGATIVE — SIGNIFICANT CHANGE UP
COVID-19 NUCLEOCAPSID TOTAL GAM ANTIBODY RESULT: 0.17 INDEX — SIGNIFICANT CHANGE UP
COVID-19 SPIKE DOMAIN AB INTERP: POSITIVE
COVID-19 SPIKE DOMAIN ANTIBODY RESULT: 37.6 U/ML — HIGH
CREAT SERPL-MCNC: 0.5 MG/DL — SIGNIFICANT CHANGE UP (ref 0.5–1.3)
EGFR: 109 ML/MIN/1.73M2 — SIGNIFICANT CHANGE UP
ENA JO1 AB SER-ACNC: <0.2 AI — SIGNIFICANT CHANGE UP
GAMMA INTERFERON BACKGROUND BLD IA-ACNC: 0.18 IU/ML — SIGNIFICANT CHANGE UP
GLUCOSE BLDC GLUCOMTR-MCNC: 106 MG/DL — HIGH (ref 70–99)
GLUCOSE BLDC GLUCOMTR-MCNC: 136 MG/DL — HIGH (ref 70–99)
GLUCOSE BLDC GLUCOMTR-MCNC: 137 MG/DL — HIGH (ref 70–99)
GLUCOSE BLDC GLUCOMTR-MCNC: 148 MG/DL — HIGH (ref 70–99)
GLUCOSE SERPL-MCNC: 108 MG/DL — HIGH (ref 70–99)
HCT VFR BLD CALC: 24.5 % — LOW (ref 34.5–45)
HGB BLD-MCNC: 7.4 G/DL — LOW (ref 11.5–15.5)
IGA FLD-MCNC: 51 MG/DL — LOW (ref 70–400)
IGG FLD-MCNC: 221 MG/DL — LOW (ref 700–1600)
IGM SERPL-MCNC: <5 MG/DL — LOW (ref 40–230)
INR BLD: 1.11 RATIO — SIGNIFICANT CHANGE UP (ref 0.88–1.16)
M TB IFN-G BLD-IMP: NEGATIVE — SIGNIFICANT CHANGE UP
M TB IFN-G CD4+ BCKGRND COR BLD-ACNC: 0.09 IU/ML — SIGNIFICANT CHANGE UP
M TB IFN-G CD4+CD8+ BCKGRND COR BLD-ACNC: 0.06 IU/ML — SIGNIFICANT CHANGE UP
MAGNESIUM SERPL-MCNC: 2.1 MG/DL — SIGNIFICANT CHANGE UP (ref 1.6–2.6)
MCHC RBC-ENTMCNC: 17.5 PG — LOW (ref 27–34)
MCHC RBC-ENTMCNC: 30.2 GM/DL — LOW (ref 32–36)
MCV RBC AUTO: 57.8 FL — LOW (ref 80–100)
NRBC # BLD: 0 /100 WBCS — SIGNIFICANT CHANGE UP (ref 0–0)
NRBC # FLD: 0.06 K/UL — HIGH (ref 0–0)
PHOSPHATE SERPL-MCNC: 3.8 MG/DL — SIGNIFICANT CHANGE UP (ref 2.5–4.5)
PLATELET # BLD AUTO: 499 K/UL — HIGH (ref 150–400)
POTASSIUM SERPL-MCNC: 3.1 MMOL/L — LOW (ref 3.5–5.3)
POTASSIUM SERPL-SCNC: 3.1 MMOL/L — LOW (ref 3.5–5.3)
PROT SERPL-MCNC: 5.1 G/DL — LOW (ref 6–8.3)
PROTHROM AB SERPL-ACNC: 12.9 SEC — SIGNIFICANT CHANGE UP (ref 10.5–13.4)
QUANT TB PLUS MITOGEN MINUS NIL: 7.9 IU/ML — SIGNIFICANT CHANGE UP
RBC # BLD: 4.24 M/UL — SIGNIFICANT CHANGE UP (ref 3.8–5.2)
RBC # FLD: 21.1 % — HIGH (ref 10.3–14.5)
RH IG SCN BLD-IMP: POSITIVE — SIGNIFICANT CHANGE UP
SARS-COV-2 IGG+IGM SERPL QL IA: 0.17 INDEX — SIGNIFICANT CHANGE UP
SARS-COV-2 IGG+IGM SERPL QL IA: 37.6 U/ML — HIGH
SARS-COV-2 IGG+IGM SERPL QL IA: NEGATIVE — SIGNIFICANT CHANGE UP
SARS-COV-2 IGG+IGM SERPL QL IA: POSITIVE
SODIUM SERPL-SCNC: 137 MMOL/L — SIGNIFICANT CHANGE UP (ref 135–145)
T-CELL CD4 SUBSET PNL BLD: 443 CELLS/UL — LOW (ref 489–1457)
WBC # BLD: 7.71 K/UL — SIGNIFICANT CHANGE UP (ref 3.8–10.5)
WBC # FLD AUTO: 7.71 K/UL — SIGNIFICANT CHANGE UP (ref 3.8–10.5)

## 2023-06-27 PROCEDURE — 99232 SBSQ HOSP IP/OBS MODERATE 35: CPT

## 2023-06-27 PROCEDURE — 99233 SBSQ HOSP IP/OBS HIGH 50: CPT | Mod: 25

## 2023-06-27 PROCEDURE — 99233 SBSQ HOSP IP/OBS HIGH 50: CPT

## 2023-06-27 PROCEDURE — 99253 IP/OBS CNSLTJ NEW/EST LOW 45: CPT

## 2023-06-27 RX ORDER — INSULIN GLARGINE 100 [IU]/ML
16 INJECTION, SOLUTION SUBCUTANEOUS ONCE
Refills: 0 | Status: COMPLETED | OUTPATIENT
Start: 2023-06-27 | End: 2023-06-27

## 2023-06-27 RX ADMIN — Medication 650 MILLIGRAM(S): at 05:16

## 2023-06-27 RX ADMIN — Medication 15 UNIT(S): at 08:50

## 2023-06-27 RX ADMIN — PIPERACILLIN AND TAZOBACTAM 25 GRAM(S): 4; .5 INJECTION, POWDER, LYOPHILIZED, FOR SOLUTION INTRAVENOUS at 14:08

## 2023-06-27 RX ADMIN — ENOXAPARIN SODIUM 40 MILLIGRAM(S): 100 INJECTION SUBCUTANEOUS at 18:18

## 2023-06-27 RX ADMIN — PIPERACILLIN AND TAZOBACTAM 25 GRAM(S): 4; .5 INJECTION, POWDER, LYOPHILIZED, FOR SOLUTION INTRAVENOUS at 05:16

## 2023-06-27 RX ADMIN — Medication 60 MILLIGRAM(S): at 05:17

## 2023-06-27 RX ADMIN — PIPERACILLIN AND TAZOBACTAM 25 GRAM(S): 4; .5 INJECTION, POWDER, LYOPHILIZED, FOR SOLUTION INTRAVENOUS at 21:55

## 2023-06-27 RX ADMIN — Medication 15 UNIT(S): at 17:18

## 2023-06-27 RX ADMIN — Medication 15 UNIT(S): at 12:48

## 2023-06-27 RX ADMIN — INSULIN GLARGINE 16 UNIT(S): 100 INJECTION, SOLUTION SUBCUTANEOUS at 23:05

## 2023-06-27 RX ADMIN — REMDESIVIR 200 MILLIGRAM(S): 5 INJECTION INTRAVENOUS at 12:47

## 2023-06-27 RX ADMIN — Medication 1 MILLIGRAM(S): at 12:49

## 2023-06-27 NOTE — PROGRESS NOTE ADULT - SUBJECTIVE AND OBJECTIVE BOX
Chief Complaint: DM 2 with hyperglycemia exacerbated by steroids     History: Patient seen at bedside. Reports she is eating meals, denies n/v, denies s/s of hypoglycemia  BG trending within target range  Remains on Solumedrol 60 mg daily    MEDICATIONS  (STANDING):  dextrose 5%. 1000 milliLiter(s) (50 mL/Hr) IV Continuous <Continuous>  dextrose 50% Injectable 25 Gram(s) IV Push once  enoxaparin Injectable 40 milliGRAM(s) SubCutaneous every 24 hours  folic acid 1 milliGRAM(s) Oral daily  glucagon  Injectable 1 milliGRAM(s) IntraMuscular once  insulin glargine Injectable (LANTUS) 20 Unit(s) SubCutaneous at bedtime  insulin lispro (ADMELOG) corrective regimen sliding scale   SubCutaneous at bedtime  insulin lispro (ADMELOG) corrective regimen sliding scale   SubCutaneous three times a day before meals  insulin lispro Injectable (ADMELOG) 15 Unit(s) SubCutaneous three times a day before meals  methylPREDNISolone sodium succinate Injectable 60 milliGRAM(s) IV Push daily  piperacillin/tazobactam IVPB.. 3.375 Gram(s) IV Intermittent every 8 hours  remdesivir  IVPB   IV Intermittent   remdesivir  IVPB 100 milliGRAM(s) IV Intermittent every 24 hours    MEDICATIONS  (PRN):  acetaminophen     Tablet .. 650 milliGRAM(s) Oral every 6 hours PRN Temp greater or equal to 38C (100.4F), Mild Pain (1 - 3)  aluminum hydroxide/magnesium hydroxide/simethicone Suspension 30 milliLiter(s) Oral every 4 hours PRN Dyspepsia  dextrose Oral Gel 15 Gram(s) Oral once PRN Blood Glucose LESS THAN 70 milliGRAM(s)/deciliter  guaiFENesin  milliGRAM(s) Oral every 12 hours PRN Cough  melatonin 3 milliGRAM(s) Oral at bedtime PRN Insomnia  ondansetron Injectable 4 milliGRAM(s) IV Push every 8 hours PRN Nausea and/or Vomiting      Allergies  amoxicillin (Rash (Mild to Mod))    Review of Systems:  Cardiovascular: No chest pain  Respiratory: +SOB, +SCHMITT  GI: No nausea, vomiting  Endocrine: no hypoglycemia     PHYSICAL EXAM:  VITALS: T(C): 36.6 (06-27-23 @ 14:14)  T(F): 97.8 (06-27-23 @ 14:14), Max: 100.6 (06-27-23 @ 05:05)  HR: 88 (06-27-23 @ 14:14) (87 - 110)  BP: 108/67 (06-27-23 @ 14:14) (108/67 - 127/63)  RR:  (16 - 20)  SpO2:  (94% - 98%)  Wt(kg): --  GENERAL: NAD  EYES: No proptosis, anicteric  HEENT:  Atraumatic, Normocephalic  RESPIRATORY: unlabored respirations on supplemental 02    CAPILLARY BLOOD GLUCOSE    POCT Blood Glucose.: 136 mg/dL (27 Jun 2023 12:28)  POCT Blood Glucose.: 148 mg/dL (27 Jun 2023 08:22)  POCT Blood Glucose.: 130 mg/dL (26 Jun 2023 22:23)  POCT Blood Glucose.: 168 mg/dL (26 Jun 2023 17:38)      06-27    137  |  100  |  9   ----------------------------<  108<H>  3.1<L>   |  26  |  0.50    eGFR: 109    Ca    9.0      06-27  Mg     2.10     06-27  Phos  3.8     06-27    TPro  5.1<L>  /  Alb  2.9<L>  /  TBili  0.5  /  DBili  x   /  AST  16  /  ALT  15  /  AlkPhos  89  06-27      Thyroid Function Tests:  06-22 @ 19:35 TSH 1.03 FreeT4 -- T3 -- Anti TPO -- Anti Thyroglobulin Ab -- TSI --      Anion Gap: 11 mmol/L (06-27-23 @ 06:28)  Anion Gap: 13 mmol/L (06-26-23 @ 05:35)  Anion Gap: 19 mmol/L (06-25-23 @ 07:06)      A1C with Estimated Average Glucose Result: 11.1 % (06-24-23 @ 06:27)      Diet, Regular:   Consistent Carbohydrate Evening Snack (CSTCHOSN)  DASH/TLC Sodium & Cholesterol Restricted (DASH) (06-24-23 @ 15:48)

## 2023-06-27 NOTE — CONSULT NOTE ADULT - SUBJECTIVE AND OBJECTIVE BOX
Florence Arthur | PGY-2    OVERNIGHT EVENTS: No acute overnight events.    SUBJECTIVE:       MEDICATIONS  (STANDING):  dextrose 5%. 1000 milliLiter(s) (50 mL/Hr) IV Continuous <Continuous>  dextrose 50% Injectable 25 Gram(s) IV Push once  enoxaparin Injectable 40 milliGRAM(s) SubCutaneous every 24 hours  folic acid 1 milliGRAM(s) Oral daily  glucagon  Injectable 1 milliGRAM(s) IntraMuscular once  insulin glargine Injectable (LANTUS) 20 Unit(s) SubCutaneous at bedtime  insulin lispro (ADMELOG) corrective regimen sliding scale   SubCutaneous at bedtime  insulin lispro (ADMELOG) corrective regimen sliding scale   SubCutaneous three times a day before meals  insulin lispro Injectable (ADMELOG) 15 Unit(s) SubCutaneous three times a day before meals  methylPREDNISolone sodium succinate Injectable 60 milliGRAM(s) IV Push daily  piperacillin/tazobactam IVPB.. 3.375 Gram(s) IV Intermittent every 8 hours  remdesivir  IVPB   IV Intermittent   remdesivir  IVPB 100 milliGRAM(s) IV Intermittent every 24 hours    MEDICATIONS  (PRN):  acetaminophen     Tablet .. 650 milliGRAM(s) Oral every 6 hours PRN Temp greater or equal to 38C (100.4F), Mild Pain (1 - 3)  aluminum hydroxide/magnesium hydroxide/simethicone Suspension 30 milliLiter(s) Oral every 4 hours PRN Dyspepsia  dextrose Oral Gel 15 Gram(s) Oral once PRN Blood Glucose LESS THAN 70 milliGRAM(s)/deciliter  guaiFENesin  milliGRAM(s) Oral every 12 hours PRN Cough  melatonin 3 milliGRAM(s) Oral at bedtime PRN Insomnia  ondansetron Injectable 4 milliGRAM(s) IV Push every 8 hours PRN Nausea and/or Vomiting        T(F): 100.6 (06-27-23 @ 05:05), Max: 100.6 (06-27-23 @ 05:05)  HR: 110 (06-27-23 @ 04:57) (99 - 110)  BP: 127/63 (06-27-23 @ 04:57) (112/59 - 127/63)  BP(mean): --  RR: 20 (06-27-23 @ 04:57) (19 - 20)  SpO2: 94% (06-27-23 @ 04:57) (94% - 98%)    PHYSICAL EXAM:     GENERAL: NAD, lying in bed comfortably  HEAD:  Atraumatic, Normocephalic  EYES: EOMI, PERRLA, conjunctiva and sclera clear, no nystagmus noted  ENT: Moist mucous membranes,   NECK: Supple, No JVD, trachea midline  CHEST/LUNG: Clear to auscultation bilaterally; No rales, rhonchi, wheezing, or rubs. Unlabored respirations  HEART: Regular rate and rhythm; No murmurs, rubs, or gallops, normal S1/S2  ABDOMEN: normal bowel sounds; Soft, nontender, nondistended, no organomegaly   EXTREMITIES:  2+ Peripheral Pulses, brisk capillary refill. No clubbing, cyanosis, or edema  MSK: No gross deformities noted   Neurological:  A&Ox3, no focal deficits   SKIN: No rashes or lesions  PSYCH: Normal mood, affect     TELEMETRY:    LABS:                        7.4    7.71  )-----------( 499      ( 27 Jun 2023 06:28 )             24.5     06-27    137  |  100  |  9   ----------------------------<  108<H>  3.1<L>   |  26  |  0.50    Ca    9.0      27 Jun 2023 06:28  Phos  3.8     06-27  Mg     2.10     06-27    TPro  5.1<L>  /  Alb  2.9<L>  /  TBili  0.5  /  DBili  x   /  AST  16  /  ALT  15  /  AlkPhos  89  06-27        PT/INR - ( 27 Jun 2023 06:28 )   PT: 12.9 sec;   INR: 1.11 ratio             Creatinine Trend: 0.50<--, 0.58<--, 0.54<--, 0.36<--, 0.41<--, 0.36<--  I&O's Summary    BNP    RADIOLOGY & ADDITIONAL STUDIES:             Florence Arthur | PGY-2      SUBJECTIVE: Pt reports dx of Lupus 10 years ago, at which pt was started o appropriate therapy. Pt was well managed on therapies w/ no flares until pt was non able to follow-up with rheumatologist iso insurance difficulties and the pandemic. Pt reports family history of thyroid disease in sister and late-onset diabetes in brother. Pt reports hx of minor colds once a year prior to first episode of COVID 2 months ago. Pt denies any hx of severe sinopulmonary infections. Pt reports decline in health after onset of COVID. Pt reports being placed on oxygen after second hospitalization, but no prior oxygen requirements prior to COVID. Pt reports she was diagnosed w/ late-onset diabetes during 2nd hospitalization and being insulin-dependent currently. Pt reports no family history of cancers or any hx of multiple abortions.    Pt reports improvement in respiratory symptoms compared to admission. Pt reports dyspnea on exertion but is able to speak full sentences when sitting.       MEDICATIONS  (STANDING):  dextrose 5%. 1000 milliLiter(s) (50 mL/Hr) IV Continuous <Continuous>  dextrose 50% Injectable 25 Gram(s) IV Push once  enoxaparin Injectable 40 milliGRAM(s) SubCutaneous every 24 hours  folic acid 1 milliGRAM(s) Oral daily  glucagon  Injectable 1 milliGRAM(s) IntraMuscular once  insulin glargine Injectable (LANTUS) 20 Unit(s) SubCutaneous at bedtime  insulin lispro (ADMELOG) corrective regimen sliding scale   SubCutaneous at bedtime  insulin lispro (ADMELOG) corrective regimen sliding scale   SubCutaneous three times a day before meals  insulin lispro Injectable (ADMELOG) 15 Unit(s) SubCutaneous three times a day before meals  methylPREDNISolone sodium succinate Injectable 60 milliGRAM(s) IV Push daily  piperacillin/tazobactam IVPB.. 3.375 Gram(s) IV Intermittent every 8 hours  remdesivir  IVPB   IV Intermittent   remdesivir  IVPB 100 milliGRAM(s) IV Intermittent every 24 hours    MEDICATIONS  (PRN):  acetaminophen     Tablet .. 650 milliGRAM(s) Oral every 6 hours PRN Temp greater or equal to 38C (100.4F), Mild Pain (1 - 3)  aluminum hydroxide/magnesium hydroxide/simethicone Suspension 30 milliLiter(s) Oral every 4 hours PRN Dyspepsia  dextrose Oral Gel 15 Gram(s) Oral once PRN Blood Glucose LESS THAN 70 milliGRAM(s)/deciliter  guaiFENesin  milliGRAM(s) Oral every 12 hours PRN Cough  melatonin 3 milliGRAM(s) Oral at bedtime PRN Insomnia  ondansetron Injectable 4 milliGRAM(s) IV Push every 8 hours PRN Nausea and/or Vomiting        T(F): 100.6 (06-27-23 @ 05:05), Max: 100.6 (06-27-23 @ 05:05)  HR: 110 (06-27-23 @ 04:57) (99 - 110)  BP: 127/63 (06-27-23 @ 04:57) (112/59 - 127/63)  BP(mean): --  RR: 20 (06-27-23 @ 04:57) (19 - 20)  SpO2: 94% (06-27-23 @ 04:57) (94% - 98%)    PHYSICAL EXAM:     GENERAL: +3L NC; NAD, sitting in bed comfortably  HEAD:  Atraumatic, Normocephalic  EYES: EOMI, PERRLA, conjunctiva and sclera clear, no nystagmus noted  ENT: Moist mucous membranes,   NECK: Supple, No JVD, trachea midline  CHEST/LUNG: Reduced breath sounds in carmine lung posteriorly L>R; No rales, rhonchi, wheezing, or rubs. Unlabored respirations  HEART: Regular rate and rhythm; No murmurs, rubs, or gallops, normal S1/S2  ABDOMEN: normal bowel sounds; Soft, nontender, nondistended, no organomegaly   EXTREMITIES:  2+ Peripheral Pulses, brisk capillary refill. No clubbing, cyanosis, or edema  MSK: No gross deformities noted   Neurological:  A&Ox3, no focal deficits   SKIN: No rashes or lesions  PSYCH: Normal mood, affect     TELEMETRY:    LABS:                        7.4    7.71  )-----------( 499      ( 27 Jun 2023 06:28 )             24.5     06-27    137  |  100  |  9   ----------------------------<  108<H>  3.1<L>   |  26  |  0.50    Ca    9.0      27 Jun 2023 06:28  Phos  3.8     06-27  Mg     2.10     06-27    TPro  5.1<L>  /  Alb  2.9<L>  /  TBili  0.5  /  DBili  x   /  AST  16  /  ALT  15  /  AlkPhos  89  06-27        PT/INR - ( 27 Jun 2023 06:28 )   PT: 12.9 sec;   INR: 1.11 ratio             Creatinine Trend: 0.50<--, 0.58<--, 0.54<--, 0.36<--, 0.41<--, 0.36<--  I&O's Summary    BNP    RADIOLOGY & ADDITIONAL STUDIES:             Florence Arthur | PGY-2      SUBJECTIVE:   Ms. Saldana is a 57 y/o female with SLE with reportedly positive anti-dsDNA antibodies diagnosed 10 years ago previously on HCQ which she has been off of for several years, thalassemia, newly diagnosed insulin dependent diabetes, new O2 requirement, recent discovered thymoma s/p biopsy who presented to the hospital with acute on chronic hypoxic respiratory failure. She states that until 2 months ago she was healthy (aside from SLE which was asymptomatic) when she began to feel fatigued. She presented to an urgent care where she was diagnosed with COVID and told to go to the hospital. The details of her previous hospitalizations are unclear, however she was treated with antibiotics and given a new diagnosis of insulin dependent diabetes. She continued to have symptoms after discharged and presented to another hospital. There she was found to have a thymoma and underwent biopsy and bronchoscopy (unclear what the results of bronchoscopy were) and discharged on oxygen. She presented to [facility] with ongoing fevers and SOB.    On presentation here, she was hemodynamically stable requiring 3L NC. her CBC showed Hb 8.3 (chronic) without leukocytosis, normal renal function, and mildly elevated ALT 40s and alk phos 120s. RVP was negative at that time. CXR showing bilateral reticulonodular opacities and CT PE re-demonstrating the thymoma as well, non-specific ground glass and reticular opacities with mild bronchiectasis. She was started on levofloxacin for empiric PNA treatment. Repeat RVP on  now positive for COVID-19. 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 She was started on methylprednisolone 60mg daily. Pulmonology was less suspicious for bacterial infection, but recommended empiric broadening of antibiotics to zosyn. Neurology consulted due to concern for myasthenia gravis given thymoma, lower suspicion but work-up ordered.    Immunoglobulin panel was ordered and showed IgG 221 (L), IgA 51 (L), IgM <5 (L). Full T cell subsets showing absent B cells and mildly decreased CD4 T cells (in the setting of steroids). A&I consulted for hypogammaglobulinemia.     On interview, she reports no significant history of frequent otosinopulmonary infections. She reports one "cold" per year. She was diagnosed with SLE 10 years ago due to joint pain in her hands which made it difficult to open jars, no significant cutaneous symptoms. She prescribed high dose steroids and tapered off and subsequently placed on HCQ. She stopped taking HCQ during COVID due to changes in her insurance and difficulty with access.       Family history:  Sister with thyroid disease, possibly developing rheumatoid arthritis. Brother developed diabetes in his 20s. Father  of colon cancer in his 50s  No family history of recurrent infections, primary immunodeficiency  Mother with dementia  No history of recurrent miscarriages, recurrent infections, or frequent antibiotic use  Family is from [place] and parents are not related    Social history:  Worked as an         MEDICATIONS  (STANDING):  dextrose 5%. 1000 milliLiter(s) (50 mL/Hr) IV Continuous <Continuous>  dextrose 50% Injectable 25 Gram(s) IV Push once  enoxaparin Injectable 40 milliGRAM(s) SubCutaneous every 24 hours  folic acid 1 milliGRAM(s) Oral daily  glucagon  Injectable 1 milliGRAM(s) IntraMuscular once  insulin glargine Injectable (LANTUS) 20 Unit(s) SubCutaneous at bedtime  insulin lispro (ADMELOG) corrective regimen sliding scale   SubCutaneous at bedtime  insulin lispro (ADMELOG) corrective regimen sliding scale   SubCutaneous three times a day before meals  insulin lispro Injectable (ADMELOG) 15 Unit(s) SubCutaneous three times a day before meals  methylPREDNISolone sodium succinate Injectable 60 milliGRAM(s) IV Push daily  piperacillin/tazobactam IVPB.. 3.375 Gram(s) IV Intermittent every 8 hours  remdesivir  IVPB   IV Intermittent   remdesivir  IVPB 100 milliGRAM(s) IV Intermittent every 24 hours    MEDICATIONS  (PRN):  acetaminophen     Tablet .. 650 milliGRAM(s) Oral every 6 hours PRN Temp greater or equal to 38C (100.4F), Mild Pain (1 - 3)  aluminum hydroxide/magnesium hydroxide/simethicone Suspension 30 milliLiter(s) Oral every 4 hours PRN Dyspepsia  dextrose Oral Gel 15 Gram(s) Oral once PRN Blood Glucose LESS THAN 70 milliGRAM(s)/deciliter  guaiFENesin  milliGRAM(s) Oral every 12 hours PRN Cough  melatonin 3 milliGRAM(s) Oral at bedtime PRN Insomnia  ondansetron Injectable 4 milliGRAM(s) IV Push every 8 hours PRN Nausea and/or Vomiting        T(F): 100.6 (23 @ 05:05), Max: 100.6 (23 @ 05:05)  HR: 110 (23 @ 04:57) (99 - 110)  BP: 127/63 (23 @ 04:57) (112/59 - 127/63)  BP(mean): --  RR: 20 (23 @ 04:57) (19 - 20)  SpO2: 94% (23 @ 04:57) (94% - 98%)    PHYSICAL EXAM:     GENERAL: +3L NC; NAD, sitting in bed comfortably  HEAD:  Atraumatic, Normocephalic  EYES: EOMI, PERRLA, conjunctiva and sclera clear, no nystagmus noted  ENT: Moist mucous membranes,   NECK: Supple, No JVD, trachea midline  CHEST/LUNG: Reduced breath sounds in carmine lung posteriorly L>R; No rales, rhonchi, wheezing, or rubs. Unlabored respirations  HEART: Regular rate and rhythm; No murmurs, rubs, or gallops, normal S1/S2  ABDOMEN: normal bowel sounds; Soft, nontender, nondistended, no organomegaly   EXTREMITIES:  2+ Peripheral Pulses, brisk capillary refill. No clubbing, cyanosis, or edema  MSK: No gross deformities noted   Neurological:  A&Ox3, no focal deficits   SKIN: No rashes or lesions  PSYCH: Normal mood, affect     TELEMETRY:    LABS:                        7.4    7.71  )-----------( 499      ( 2023 06:28 )             24.5     -    137  |  100  |  9   ----------------------------<  108<H>  3.1<L>   |  26  |  0.50    Ca    9.0      2023 06:28  Phos  3.8       Mg     2.10         TPro  5.1<L>  /  Alb  2.9<L>  /  TBili  0.5  /  DBili  x   /  AST  16  /  ALT  15  /  AlkPhos  89          PT/INR - ( 2023 06:28 )   PT: 12.9 sec;   INR: 1.11 ratio             Creatinine Trend: 0.50<--, 0.58<--, 0.54<--, 0.36<--, 0.41<--, 0.36<--  I&O's Summary    BNP    RADIOLOGY & ADDITIONAL STUDIES:   CT PE:   IMPRESSION:  No pulmonary embolism.  3.9 x 4.9 cm mediastinal mass corresponding to known thymoma.  Nonspecific groundglass and reticular opacities with mild bronchiectasis   in both lungs may be sequela of atypical infection or related to chronic   interstitial disease given history of SLE.           Florence Arthur | PGY-2      SUBJECTIVE:   Ms. Saldana is a 57 y/o female with SLE with reportedly positive anti-dsDNA antibodies diagnosed 10 years ago previously on HCQ which she has been off of for several years, thalassemia, newly diagnosed insulin dependent diabetes, new O2 requirement, recent discovered thymoma s/p biopsy who presented to the hospital with acute on chronic hypoxic respiratory failure. She states that until 2 months ago she was healthy (aside from SLE which was asymptomatic) when she began to feel fatigued. She presented to an urgent care where she was diagnosed with COVID and told to go to the hospital. The details of her previous hospitalizations are unclear, however she was treated with antibiotics and given a new diagnosis of insulin dependent diabetes. She continued to have symptoms after discharged and presented to another hospital. There she was found to have a thymoma and underwent biopsy and bronchoscopy (unclear what the results of bronchoscopy were) and discharged on oxygen. She presented to [facility] with ongoing fevers and SOB.    On presentation here, she was hemodynamically stable requiring 3L NC. Her CBC showed Hb 8.3 (chronic) without leukocytosis, normal renal function, and mildly elevated ALT 40s and alk phos 120s. RVP was negative at that time. CXR showing bilateral reticulonodular opacities and CT PE re-demonstrating the thymoma as well, non-specific ground glass and reticular opacities with mild bronchiectasis. She was started on levofloxacin for empiric PNA treatment. Repeat RVP on  now positive for COVID-19. 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 She was started on methylprednisolone 60mg daily. Pulmonology was less suspicious for bacterial infection, but recommended empiric broadening of antibiotics to zosyn. Neurology consulted due to concern for myasthenia gravis given thymoma, lower suspicion but work-up ordered.    Immunoglobulin panel was ordered and showed IgG 221 (L), IgA 51 (L), IgM <5 (L). Full T cell subsets showing absent B cells and mildly decreased CD4 T cells (in the setting of steroids). A&I consulted for hypogammaglobulinemia.     On interview, she reports no significant history of frequent otosinopulmonary infections. She reports one "cold" per year. She was diagnosed with SLE 10 years ago due to joint pain in her hands which made it difficult to open jars, no significant cutaneous symptoms. She prescribed high dose steroids and tapered off and subsequently placed on HCQ. She stopped taking HCQ during COVID due to changes in her insurance and difficulty with access.       Family history:  Sister with thyroid disease, possibly developing rheumatoid arthritis. Brother developed diabetes in his 20s. Father  of colon cancer in his 50s  No family history of recurrent infections, primary immunodeficiency  Mother with dementia  No history of recurrent miscarriages, recurrent infections, or frequent antibiotic use  Family is from [place] and parents are not related    Social history:  Worked as an         MEDICATIONS  (STANDING):  dextrose 5%. 1000 milliLiter(s) (50 mL/Hr) IV Continuous <Continuous>  dextrose 50% Injectable 25 Gram(s) IV Push once  enoxaparin Injectable 40 milliGRAM(s) SubCutaneous every 24 hours  folic acid 1 milliGRAM(s) Oral daily  glucagon  Injectable 1 milliGRAM(s) IntraMuscular once  insulin glargine Injectable (LANTUS) 20 Unit(s) SubCutaneous at bedtime  insulin lispro (ADMELOG) corrective regimen sliding scale   SubCutaneous at bedtime  insulin lispro (ADMELOG) corrective regimen sliding scale   SubCutaneous three times a day before meals  insulin lispro Injectable (ADMELOG) 15 Unit(s) SubCutaneous three times a day before meals  methylPREDNISolone sodium succinate Injectable 60 milliGRAM(s) IV Push daily  piperacillin/tazobactam IVPB.. 3.375 Gram(s) IV Intermittent every 8 hours  remdesivir  IVPB   IV Intermittent   remdesivir  IVPB 100 milliGRAM(s) IV Intermittent every 24 hours    MEDICATIONS  (PRN):  acetaminophen     Tablet .. 650 milliGRAM(s) Oral every 6 hours PRN Temp greater or equal to 38C (100.4F), Mild Pain (1 - 3)  aluminum hydroxide/magnesium hydroxide/simethicone Suspension 30 milliLiter(s) Oral every 4 hours PRN Dyspepsia  dextrose Oral Gel 15 Gram(s) Oral once PRN Blood Glucose LESS THAN 70 milliGRAM(s)/deciliter  guaiFENesin  milliGRAM(s) Oral every 12 hours PRN Cough  melatonin 3 milliGRAM(s) Oral at bedtime PRN Insomnia  ondansetron Injectable 4 milliGRAM(s) IV Push every 8 hours PRN Nausea and/or Vomiting        T(F): 100.6 (23 @ 05:05), Max: 100.6 (23 @ 05:05)  HR: 110 (23 @ 04:57) (99 - 110)  BP: 127/63 (23 @ 04:57) (112/59 - 127/63)  BP(mean): --  RR: 20 (23 @ 04:57) (19 - 20)  SpO2: 94% (23 @ 04:57) (94% - 98%)    PHYSICAL EXAM:     GENERAL: +3L NC; NAD, sitting in bed comfortably  HEAD:  Atraumatic, Normocephalic  EYES: EOMI, PERRLA, conjunctiva and sclera clear, no nystagmus noted  ENT: Moist mucous membranes,   NECK: Supple, No JVD, trachea midline  CHEST/LUNG: Reduced breath sounds in carmine lung posteriorly L>R; No rales, rhonchi, wheezing, or rubs. Unlabored respirations  HEART: Regular rate and rhythm; No murmurs, rubs, or gallops, normal S1/S2  ABDOMEN: normal bowel sounds; Soft, nontender, nondistended, no organomegaly   EXTREMITIES:  2+ Peripheral Pulses, brisk capillary refill. No clubbing, cyanosis, or edema  MSK: No gross deformities noted   Neurological:  A&Ox3, no focal deficits   SKIN: No rashes or lesions  PSYCH: Normal mood, affect     TELEMETRY:    LABS:                        7.4    7.71  )-----------( 499      ( 2023 06:28 )             24.5     -    137  |  100  |  9   ----------------------------<  108<H>  3.1<L>   |  26  |  0.50    Ca    9.0      2023 06:28  Phos  3.8       Mg     2.10         TPro  5.1<L>  /  Alb  2.9<L>  /  TBili  0.5  /  DBili  x   /  AST  16  /  ALT  15  /  AlkPhos  89          PT/INR - ( 2023 06:28 )   PT: 12.9 sec;   INR: 1.11 ratio             Creatinine Trend: 0.50<--, 0.58<--, 0.54<--, 0.36<--, 0.41<--, 0.36<--  I&O's Summary    BNP    RADIOLOGY & ADDITIONAL STUDIES:   CT PE:   IMPRESSION:  No pulmonary embolism.  3.9 x 4.9 cm mediastinal mass corresponding to known thymoma.  Nonspecific groundglass and reticular opacities with mild bronchiectasis   in both lungs may be sequela of atypical infection or related to chronic   interstitial disease given history of SLE.

## 2023-06-27 NOTE — PROGRESS NOTE ADULT - SUBJECTIVE AND OBJECTIVE BOX
[facility] Division of Hospital Medicine  Gabbie Olmedo MD  Pager [phone]    Patient is a 58y old  Female who presents with a chief complaint of Shortness of Breath       SUBJECTIVE / OVERNIGHT EVENTS: reports some improvement; still SOB with minimal activity; appetite is good; +BM      MEDICATIONS  (STANDING):  dextrose 5%. 1000 milliLiter(s) (50 mL/Hr) IV Continuous <Continuous>  dextrose 50% Injectable 25 Gram(s) IV Push once  enoxaparin Injectable 40 milliGRAM(s) SubCutaneous every 24 hours  folic acid 1 milliGRAM(s) Oral daily  glucagon  Injectable 1 milliGRAM(s) IntraMuscular once  insulin glargine Injectable (LANTUS) 20 Unit(s) SubCutaneous at bedtime  insulin lispro (ADMELOG) corrective regimen sliding scale   SubCutaneous at bedtime  insulin lispro (ADMELOG) corrective regimen sliding scale   SubCutaneous three times a day before meals  insulin lispro Injectable (ADMELOG) 15 Unit(s) SubCutaneous three times a day before meals  methylPREDNISolone sodium succinate Injectable 60 milliGRAM(s) IV Push daily  piperacillin/tazobactam IVPB.. 3.375 Gram(s) IV Intermittent every 8 hours  remdesivir  IVPB 100 milliGRAM(s) IV Intermittent every 24 hours  remdesivir  IVPB   IV Intermittent     MEDICATIONS  (PRN):  acetaminophen     Tablet .. 650 milliGRAM(s) Oral every 6 hours PRN Temp greater or equal to 38C (100.4F), Mild Pain (1 - 3)  aluminum hydroxide/magnesium hydroxide/simethicone Suspension 30 milliLiter(s) Oral every 4 hours PRN Dyspepsia  dextrose Oral Gel 15 Gram(s) Oral once PRN Blood Glucose LESS THAN 70 milliGRAM(s)/deciliter  guaiFENesin  milliGRAM(s) Oral every 12 hours PRN Cough  melatonin 3 milliGRAM(s) Oral at bedtime PRN Insomnia  ondansetron Injectable 4 milliGRAM(s) IV Push every 8 hours PRN Nausea and/or Vomiting      CAPILLARY BLOOD GLUCOSE  POCT Blood Glucose.: 136 mg/dL (27 Jun 2023 12:28)  POCT Blood Glucose.: 148 mg/dL (27 Jun 2023 08:22)  POCT Blood Glucose.: 130 mg/dL (26 Jun 2023 22:23)  POCT Blood Glucose.: 168 mg/dL (26 Jun 2023 17:38)      PHYSICAL EXAM:  Vital Signs Last 24 Hrs  T(F): 97.8 (27 Jun 2023 14:14), Max: 100.6 (27 Jun 2023 05:05)  HR: 88 (27 Jun 2023 14:14) (87 - 110)  BP: 108/67 (27 Jun 2023 14:14) (108/67 - 127/63)  RR: 16 (27 Jun 2023 14:14) (16 - 20)  SpO2: 97% (27 Jun 2023 14:14) (94% - 98%)    Parameters below as of 27 Jun 2023 14:14  Patient On (Oxygen Delivery Method): nasal cannula  O2 Flow (L/min): 3      CONSTITUTIONAL: NAD, appears comfortable  EYES: PERRLA; conjunctiva and sclera clear  ENMT: Moist oral mucosa; normal dentition  RESPIRATORY: Normal respiratory effort; lungs are clear to auscultation bilaterally; speaking full sentences  CARDIOVASCULAR: Regular rate and rhythm; No lower extremity edema  ABDOMEN: Nontender to palpation, normoactive bowel sounds  MUSCULOSKELETAL: no clubbing or cyanosis of digits; no joint swelling or tenderness to palpation  PSYCH: A+O to person, place, and time; affect appropriate  NEUROLOGY: CN 2-12 are intact and symmetric; no gross sensory deficits   SKIN: No rashes; no palpable lesions    LABS:                        7.4    7.71  )-----------( 499      ( 27 Jun 2023 06:28 )             24.5     06-27    137  |  100  |  9   ----------------------------<  108<H>  3.1<L>   |  26  |  0.50    Ca    9.0      27 Jun 2023 06:28  Phos  3.8     06-27  Mg     2.10     06-27    TPro  5.1<L>  /  Alb  2.9<L>  /  TBili  0.5  /  DBili  x   /  AST  16  /  ALT  15  /  AlkPhos  89  06-27    PT/INR - ( 27 Jun 2023 06:28 )   PT: 12.9 sec;   INR: 1.11 ratio               Urinalysis Basic - ( 27 Jun 2023 06:28 )    Color: x / Appearance: x / SG: x / pH: x  Gluc: 108 mg/dL / Ketone: x  / Bili: x / Urobili: x   Blood: x / Protein: x / Nitrite: x   Leuk Esterase: x / RBC: x / WBC x   Sq Epi: x / Non Sq Epi: x / Bacteria: x        Culture - Blood (collected 26 Jun 2023 05:50)  Source: .Blood Blood-Peripheral  Preliminary Report (27 Jun 2023 10:01):    No growth at 24 hours    Culture - Blood (collected 26 Jun 2023 05:39)  Source: .Blood Blood-Peripheral  Preliminary Report (27 Jun 2023 10:01):    No growth at 24 hours

## 2023-06-27 NOTE — CONSULT NOTE ADULT - PROBLEM SELECTOR RECOMMENDATION 3
- Recurrent COVID infections  - Tx w/ remdesivir and convalescent plasma   - Consult ID iso possible autoantibody against type I IFN as poss cause for hypoglobulinemia - Recurrent COVID infections  - Tx w/ remdesivir and convalescent plasma   - Consult ID for poss initiation of paxlovid iso possible autoantibody against type I IFN from COVID - Recommend ID consult for anti-virals in setting of COVID

## 2023-06-27 NOTE — CHART NOTE - NSCHARTNOTEFT_GEN_A_CORE
pt continues work up for thymoma  rheum and pulmonary consulted  on isolation for +COVID  no thoracic surgical interventional planned at this time  please contact as needed    Mela VERDE [phone]

## 2023-06-27 NOTE — CONSULT NOTE ADULT - ATTENDING COMMENTS
Ms. 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      As described above, Good Syndrome is characterized by thymoma, hypogammaglobulinemia, T cell dysfunction, higher risk of infection and autoimmune sequelae.  Appreciate the multispecialty care.  Given her T and B cell immunodeficiency, and the treatment of the presumed ILD with high dose steroids, would recommend ruling out any possible opportunistic infectious etiologies to her acute on chronic respiratory failure and obtaining ID input regarding appropriate care for her COVID infection (which she had only a few months ago).  She will benefit from immunoglobulin replacement once she is more stable, which we can initiate as an outpatient. Labs as above to establish her baseline immuno-phenotype.     Thank you for this consult. Ms. 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      As described above, Good Syndrome is a rare condition, characterized by thymoma, combined B cell and T cell immunodeficiency (hypogammaglobulinemia and T cell dysfunction), increased susceptibility to encapsulated bacterial and opportunistic infections (fungal, viral),  and autoimmune sequelae.  Appreciate the multispecialty care.  Given her T and B cell immunodeficiency, and the treatment of the presumed ILD with high dose steroids, would recommend ruling out any possible opportunistic infectious etiologies to her acute on chronic respiratory failure and obtaining ID input regarding appropriate care for her COVID infection (which she had only a few months ago).  She will benefit from immunoglobulin replacement once she is more stable, which we can initiate as an outpatient. Labs as above to establish her baseline immuno-phenotype.     Thank you for this consult.

## 2023-06-27 NOTE — CONSULT NOTE ADULT - PROBLEM SELECTOR RECOMMENDATION 2
- On 3 L O2 at home  - CXR 6/22: carmine reticular nodular opacities  - Rheumatology following and concerned for anti-MDA-5 antibodies c/b rapidly progressing interstitial lung disease and was started on solumedrol 60mg  - May be 2/2 recurrent COVID - On 3 L O2 at home  - CXR 6/22: carmine reticular nodular opacities  - Rheumatology following and concerned for anti-MDA-5 antibodies c/b rapidly progressing interstitial lung disease and was started on solumedrol 60mg  - May be 2/2 recurrent COVID  - Recommend consulting ID iso repeated COVID and impact on IFN-gamma w/ pt being immunosuppressed - Rheumatology following and evaluating for rapidly progressive interstitial lung disease, on solumedrol  - Pulmonology following

## 2023-06-27 NOTE — CONSULT NOTE ADULT - ASSESSMENT
Ms. 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 CT PE done in the hospital showed nonspecific ground glass and reticular opacities with mild bronchiectasias bilaterally and re-demonstrated known thymoma. Rheumatology following and concerned for anti-MDA-5 antibodies c/b rapidly progressing interstitial lung disease and was started on solumedrol 60mg. Immunoglobulin panel was drawn and IgG 190, IgA 72, and IgM < 10. Full T cell subsets with absent B cells, normal total T cell and CD8 cells, and mildly decreased CD4 cells. A&I was contacted given hypogammaglobulinemia.  Ms. Saldana is a 57 y/o female with SLE with reportedly positive anti-dsDNA antibodies diagnosed 10 years ago previously on HCQ which she has been off of for several years, thalassemia, newly diagnosed insulin dependent diabetes, new O2 requirement, recent discovered thymoma s/p biopsy who presented to the hospital with acute on chronic hypoxic respiratory failure. She was found to have absent B cells and hypogammaglobulinemia which in the setting of a thymoma is highly suspicious for Good syndrome.     GOOD SYNDROME  This syndrome typically presents around the 5th decade of life (median age 55-63 y/o) with thymoma, hypogammaglobulinemia, and absent B cells. The hypogammaglobulinemia puts them at high risk of otosinopulmonary infections and developing bronchiectiasis, which she already has evidence of. These patients have significant immune dysregulation present and these patients are at high risk of autoimmunity. It is possible she has had subclinical Good syndrome for many years and the presenting symptom was autoimmune disease in the form of SLE. They are at high risk of autoimmune disease in general and appear to frequently develop auto-antibodies (autoimmune cytopenias, anti-cytokine antibodies). Additionally, patients often have a component of cellular immunodeficiency. This places them at increased risk of fungal, viral, and opportunistic infections, especially while on high dose steroids. It is of the upmost importance to have a high index of suspicion for less common infectious culprits. She will need to be started on immunoglobulin replacement therapy, however, it would be most prudent to wait until her respiratory status has stabilized before starting them as there is a small risk of TRALI with IVIG due antibody-induced complement activation. She will need to follow-up closely outpatient after discharge and we will work on starting treatment.     ACUTE ON CHRONIC HYPOXIC RESPIRATORY FAILURE  IN THE SETTING OF COVID-19 AND SLE  Agree with thorough rheumatologic evaluation to assess as an explanation for ILD. Additionally, would recommend infectious disease consult. Patients with Good syndrome have been shown to create auto antibodies to type I interferons and auto-antibodies to type I interferons have been shown to be associated with severe COVID. Given this risk factor and her abrupt worsening of respiratory function, diffuse reticulonodular opacities, positive COVID-19 PCR, would appreciate ID evaluation on whether additional COVID-19 anti virals are indicated.     INSULIN DEPENDENT DIABETES MELLITUS  She has new insulin dependent diabetes diagnosed within the past month and presumably was not diabetic a few years ago followed by rheumatology and given steroids. Given Good syndrome as a risk factor for autoimmunity and the fact that her brother developed diabetes in his 20s, it may be worth evaluating for late onset type 1 diabetes.     We will continue to follow. Please feel free to message on MS Teams with any questions or concerns. If after 5PM please page fellow on call.    Note written by IM Resident Florence Arthur MD, reviewed and edited by allergy fellow Sultan Andrea DO.  Ms. Saldana is a 57 y/o female with SLE with reportedly positive anti-dsDNA antibodies diagnosed 10 years ago previously on HCQ which she has been off of for several years, thalassemia, newly diagnosed insulin dependent diabetes, new O2 requirement, recent discovered thymoma s/p biopsy who presented to the hospital with acute on chronic hypoxic respiratory failure. She was found to have absent B cells and hypogammaglobulinemia which in the setting of a thymoma is highly suspicious for Good syndrome.     GOOD SYNDROME  This syndrome typically presents around the 5th decade of life (median age 55-63 y/o) with thymoma, hypogammaglobulinemia, and absent B cells. The hypogammaglobulinemia puts them at high risk of otosinopulmonary infections and developing bronchiectiasis, which she already has evidence of. These patients have significant immune dysregulation present and at high risk of autoimmunity. They are at high risk of autoimmune disease in general and appear to frequently develop auto-antibodies (autoimmune cytopenias, anti-cytokine antibodies). It is possible she has had Good syndrome for many years and the presenting symptom was autoimmune disease in the form of SLE.  Additionally, patients often have a component of cellular immunodeficiency. This places them at increased risk of fungal, viral, and opportunistic infections, especially while on high dose steroids. It is important to maintain a high index of suspicion for less common infectious culprits. She will need to be started on immunoglobulin replacement therapy, however, it would be most prudent to wait until her respiratory status has stabilized before starting this treatment. There is a small risk of TRALI with IVIG due antibody-induced complement activation. She will need to follow-up closely outpatient after discharge and we will work on starting treatment.     ACUTE ON CHRONIC HYPOXIC RESPIRATORY FAILURE  IN THE SETTING OF COVID-19 AND SLE  Agree with thorough rheumatologic evaluation to assess as an explanation for ILD. Additionally, would recommend infectious disease consult. Patients with Good syndrome have been shown to create auto antibodies to type I interferons and auto-antibodies to type I interferons have been shown to be associated with severe COVID. Given this risk factor and her abrupt worsening of respiratory function, diffuse reticulonodular opacities, positive COVID-19 PCR, would appreciate ID evaluation on whether additional COVID-19 anti virals are indicated.     INSULIN DEPENDENT DIABETES MELLITUS  She has new insulin dependent diabetes diagnosed within the past month and presumably was not diabetic a few years ago followed by rheumatology and given steroids. Given Good syndrome as a risk factor for autoimmunity and the fact that her brother developed diabetes in his 20s, it may be worth evaluating for late onset type 1 diabetes.     We will continue to follow. Please feel free to message on MS Teams with any questions or concerns. If after 5PM please page fellow on call.    Note written by IM Resident Florence Arthur MD, reviewed and edited by allergy fellow Sultan Andrea DO.  Ms. Saldana is a 59 y/o female with SLE with reportedly positive anti-dsDNA antibodies diagnosed 10 years ago previously on HCQ which she has been off of for several years, thalassemia, newly diagnosed insulin dependent diabetes, new O2 requirement, recent discovered thymoma s/p biopsy who presented to the hospital with acute on chronic hypoxic respiratory failure. She was found to have absent B cells and hypogammaglobulinemia which in the setting of a thymoma is highly suspicious for Good syndrome.     GOOD SYNDROME  This syndrome typically presents around the 5th decade of life (median age 55-65 y/o) with thymoma, hypogammaglobulinemia, and absent B cells. The hypogammaglobulinemia puts them at high risk of otosinopulmonary infections and developing bronchiectasis which she already has evidence of. These patients have significant immune dysregulation present and at high risk of autoimmunity. They are at high risk of autoimmune disease in general and appear to frequently develop auto-antibodies (autoimmune cytopenias, anti-cytokine antibodies). It is possible she has had Good syndrome for many years and the presenting symptom was autoimmune disease in the form of SLE.  Additionally, patients often have a component of cellular immunodeficiency. This places them at increased risk of fungal, viral, and opportunistic infections, especially while on high dose steroids. It is important to maintain a high index of suspicion for less common infectious culprits. She will need to be started on immunoglobulin replacement therapy, however, it would be most prudent to wait until her respiratory status has stabilized before starting this treatment. There is a small risk of TRALI with IVIG due antibody-induced complement activation. She will need to follow-up closely outpatient after discharge and we will work on starting treatment.     ACUTE ON CHRONIC HYPOXIC RESPIRATORY FAILURE  IN THE SETTING OF COVID-19 AND SLE  Agree with thorough rheumatologic evaluation to assess as an explanation for ILD. Additionally, would recommend infectious disease consult. Patients with Good syndrome have been shown to create auto antibodies to type I interferons and auto-antibodies to type I interferons have been shown to be associated with severe COVID. Given this risk factor and her abrupt worsening of respiratory function, diffuse reticulonodular opacities, positive COVID-19 PCR, would appreciate ID evaluation on whether additional COVID-19 anti virals are indicated, and on whether other opportunistic infectious etiologies (fungal, viral) should be ruled out. Appreciate input from Pulmonary team.     INSULIN DEPENDENT DIABETES MELLITUS  She has new insulin dependent diabetes diagnosed within the past month and presumably was not diabetic a few years ago followed by rheumatology and given steroids. Given Good syndrome as a risk factor for autoimmunity and the fact that her brother developed diabetes in his 20s, it may be worth evaluating for late onset type 1 diabetes.     We will continue to follow. Please feel free to message on MS Teams with any questions or concerns. If after 5PM please page fellow on call.    Note written by IM Resident Florence Arthur MD, reviewed and edited by allergy fellow Sultan Andrea DO.

## 2023-06-27 NOTE — PROGRESS NOTE ADULT - ASSESSMENT
57 yo female adm for SLE on high-dose steroids with marked hyperglycemia. Pt was recently diagnosed with Type 2 DM on adm to [facility] for pulm issues, discharged on low-dose basal bolus. Admitted with SOB and started on Solumedrol, endocrine consulted for uncontrolled DM 2 (A1c 11.1%) with steroid induced hyperglycemia, now COVID+    1. DM 2 with hyperglycemia   A1c 11.1%  Home Regimen: Lantus 15 units SQ qHS and Admelog 5 units TID before meals; recently adjusted from Lantus 12 and Admelog 3/3/3  Glucose has improved with insulin adjustments and AG now WDL    While inpatient:  BG target 100-180 mg/dl  Continue Lantus 20 units SQ qHS   Continue Admelog to 15 units SQ TID before meals (Hold if NPO/skips meal)   Continue Admelog MODERATE dose correctional scale before meals  Continue Admelog moderate dose scale at bedtime  Consistent carbohydrate diet, RD consult appreciated   Check BG before meals and bedtime  Hypoglycemia protocol   Please adjust IV ABX in dextrose to normal saline formulations if possible    Discharge Plan:  Resume basal/bolus insulin PENS, dose TBD  Reviewed with patient the relationship between steroids and blood glucose/insulin dosing, if on steroids or steroid taper as outpatient, her insulin doses will need to be adjusted accordingly  Ensure patient has glucometer, glucose test strips, lancets, alcohol swabs and insulin PEN needles   Followup with PCP, endocrinology, podiatry and opthalmology as outpatient  Followup with prior endocrinologist Dr. Misty Dominguez, [facility]    2. Steroid induced hyperglycemia  Currently on Solumedrol 60 mg daily  Please keep endocrine informed regarding steroid plans or taper  Patient with hx of Lupus - Patient denies chronic steroids; although was on Prednisone for about 1 month when first diagnosed. Would not withdraw steroid treatment abruptly, risk for AI  Patient advised of risk of steroid-induced osteoporosis, recommend bone density as outpatient    3. HTN  BP target less than 130/80  BP within target, not on antihypertensives  Continue to monitor  Microalbumin/creat ratio as outpatient    4. HLD  LDL target less than 70  LDL resulted 117  Recommend statin if no contraindication    Ju Julien  Nurse Practitioner  Division of Endocrinology & Diabetes  In house pager #[phone]/long range pager #[phone]    If before 9AM or after 6PM, or on weekends/holidays, please call endocrine answering service for assistance ([phone]).  For nonurgent matters email [email or web address] for assistance.

## 2023-06-27 NOTE — CONSULT NOTE ADULT - PROBLEM SELECTOR RECOMMENDATION 5
- Immunoglobulin replacement therapy per above  - Low threshold to evaluate for atypical infections (fungal, viral, opportunistic)  - No clearly reportedly benefit of thymectomy in terms of immune function  - Can follow-up with CT surgery outpatient

## 2023-06-27 NOTE — CHART NOTE - NSCHARTNOTEFT_GEN_A_CORE
Ms. 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 CT PE done in the hospital showed nonspecific ground glass and reticular opacities with mild bronchiectasias bilaterally and re-demonstrated known thymoma. Rheumatology following and concerned for anti-MDA-5 antibodies c/b rapidly progressing interstitial lung disease and was started on solumedrol 60mg. Immunoglobulin panel was drawn and IgG 190, IgA 72, and IgM < 10. Full T cell subsets with absent B cells, normal total T cell and CD8 cells, and mildly decreased CD4 cells. A&I was contacted given hypogammaglobulinemia.     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 This syndrome typically presents around the 5th decade of life with thymoma, hypogammaglobulinemia, and absent B cells. These patients have significant immune dysregulation present and these patients are at high risk of autoimmunity (as may be the case here) as well as respiratory infections and diarrhea. There can be some component of cellular immune dysfunction and some patients are increased risk of viral and opportunistic infections. Thymectomy has not been shown to improve immune function.     She will need to be started on immunoglobulin replacement therapy. However, I am hesitant to start IVIG while the patient is admitted with acute respiratory decompensations due to the potential risk of TRALI related to immunoglobulins. Additionally, the immunoglobulins are unlikely to help her in the acute setting as they are used to prevent infections (rather than treat acute infections) and the dose would be replacement dose rather than immunomodulatory.     Recommendations:   - Please order mitogen stimluation assay and vaccine titers to strep pneumo, tetanus, and MMR  - We will plan to see the week she is discharged and start the process to initiate immunoglobulin replacement therapy (likely subcutaneous) Ms. 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 CT PE done in the hospital showed nonspecific ground glass and reticular opacities with mild bronchiectasias bilaterally and re-demonstrated known thymoma. Rheumatology following and concerned for anti-MDA-5 antibodies c/b rapidly progressing interstitial lung disease and was started on solumedrol 60mg. Immunoglobulin panel was drawn and IgG 190, IgA 72, and IgM < 10. Full T cell subsets with absent B cells, normal total T cell and CD8 cells, and mildly decreased CD4 cells. A&I was contacted given hypogammaglobulinemia.     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 This syndrome typically presents around the 5th decade of life (median age 55-63 y/o) with thymoma, hypogammaglobulinemia, and absent B cells. These patients have significant immune dysregulation present and these patients are at high risk of autoimmunity (as may be the case here) as well as respiratory infections and diarrhea. There can be some component of cellular immune dysfunction and some patients are increased risk of viral and opportunistic infections. Thymectomy has not been shown to improve immune function.     She will need to be started on immunoglobulin replacement therapy. However, I am hesitant to start IVIG while the patient is admitted with acute respiratory decompensations due to the potential risk of TRALI related to immunoglobulins. Additionally, the immunoglobulins are unlikely to help her in the acute setting as they are used to prevent infections (rather than treat acute infections) and the dose would be replacement dose rather than immunomodulatory.     Recommendations:   - Please order mitogen stimluation assay and vaccine titers to strep pneumo, tetanus, and MMR  - We will plan to see the week she is discharged and start the process to initiate immunoglobulin replacement therapy (likely subcutaneous)

## 2023-06-27 NOTE — CONSULT NOTE ADULT - PROBLEM SELECTOR RECOMMENDATION 4
- Pt reports late onset of insulin-dependent diabetes w/ no hx of Type 1 diabetes  - Pt reports developing diabetes prior to initiation of high dose steroids   - Recommend sending DONALD, IA-2, IAA, ICA, and ZnT8 as pt developed insulin-dependent diabetes - Consider sending evaluation for latent onset type 1 diabetes

## 2023-06-27 NOTE — CONSULT NOTE ADULT - PROBLEM SELECTOR RECOMMENDATION 9
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  - No benefit of thymectomy  - Pt will need to eventually start IGRT (replacement dose) for management, however no tx while inpt due to pt's current poor respiratory status and potential to develop TRALI   - Please order mitogen stimluation assay and vaccine titers to strep pneumo, tetanus, and MMR  - Will plan to see pt prior to DC to being IGRT  - Recommend consulting ID for poss association between COVID and autoantibodies against IFN-gamma - Presence of thymoma, hypogammaglobulinemia, absent B cells, pure red cell aplasia  - Cellular immunodeficiency in addition to humoral immunodeficiency  - Immunoglobulin panel reveals IgG 190, IgA 72, and IgM <10  - Full T cell subsets with absent B cells, normal total T cell and CD8 cells, and mildly decreased CD4 cells  - 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  - No benefit of thymectomy  - Pt will need to eventually start IGRT (replacement dose) for management, however no tx while inpt due to pt's current poor respiratory status and potential to develop TRALI   - Please order mitogen stimluation assay and vaccine titers to strep pneumo, tetanus, and MMR  - Will plan to see pt prior to DC to initiate IGRT outpt   - Pt at high risk for infections iso pt being on high dose steroids and being immunodeficient  - Recommend consulting ID for poss association between COVID and autoantibodies against IFN-gamma - Order vaccine titers to strep pneumoniae, tetanus, and MMR  - Order mitogen stimulation assay and lymphocyte antigens (please draw right before her daily steroid dose)  - Defer initiation of inpatient immunoglobulin replacement therapy. Will have close follow-up on discharge and start her on either IV or SQ

## 2023-06-27 NOTE — PROGRESS NOTE ADULT - PROBLEM SELECTOR PLAN 1
- pt has been admitted twice to [facility] for shortness of breath since May  - CTA scan with no PE, but 3.9 x 4.9 cm mediastinal mass corresponding to known thymoma. Nonspecific groundglass and reticular opacities with mild bronchiectasis in both lungs may be sequela of atypical infection or related to chronic interstitial disease given history of SLE.  - per patient she is on home oxygen at 3L   - c/w Xopenex ATC   - on Solumedrol 60mg daily   - started on Mucomyst, incentive spirometry    - c/w antibiotics as below  - aerobika for airway clearance  - check TTE  now covid +, on steroids for poss rapidly progressive lung dz due to SLE; start remdes

## 2023-06-27 NOTE — PROGRESS NOTE ADULT - ASSESSMENT
59 yo F with PMH SLE (dx ?12 years ago, not on tx for past 5 years, initially w/ joint pain and pleural effusion), new dx thymoma, recent admission to [facility] x 2 for shortness of breath (initially with covid, then represented with continued shortness of breath) who presents with shortness of breath and abnormal CT chest.     # SLE  # b/l ground glass and reticular opacities  # chronic hypoxemic respiratory failure  # thymoma  # covid  Differential is broad and difficult to narrow without prior records. Currently, CT chest shows diffuse bilateral ground glass and reticular opacities. Presumably, these were not present during admission to [facility] as pt had bronchoscopy and mediastinal mass biopsy w/o lung biopsy (this probably would have been done if there were parenchymal abnormalities). However, she was discharged on 3L home O2 which is her current oxygen requirement. She has difficulty speaking in full sentences, though this seems to be due to choking sensation rather than sob.   It seems that she is having difficulty managing secretions and may have ptosis on exam, ?myasthenia.  Rheumatology concerned about MDA-5 disease in setting of rapidly progressive lung disease and fever.  - procal 0.09 -- low suspicion for pneumonia but will cover broadly for now   - BNP 43  - RVP negative initially, now covid +  - CXR 6/22: b/l reticular nodular opacities  - CTA Chest 6/22: no PE, 3.9 x 4.9 cm mediastinal mass corresponding to known thymoma.  bilateral diffuse groundglass and reticular opacities with mild bronchiectasis        Plan:  - f/u [facility] records w/ prior CT/MRI for comparison  - c/w empiric abx (on zosyn)  - duonebs q6  - please provide with aerobika (order as airway clearance device) to use w/ nebulized meds   - sputum cx  - f/u TTE  - swallow eval  - agree w/ steroids per rheum (solumedrol 60 qd)  - f/u MDA-5 ab, anti Glory, myomarker panel, dsDNA, ANTOINE, Sjogren, C3, C4, ferritin, myasthenia serologies  - f/u quantiferon   - please obtain records from [facility] (prior hospitalization notes, radiology reports, autoimmune labs  - immunoglobulins low (Igg, iga, and igm) - this is likely due to thymoma  - would treat covid with remdesivir, convalescent plasma given low immunoglobulin levels   - f/u COVID spike and nucleocapsid ab.

## 2023-06-27 NOTE — PROGRESS NOTE ADULT - SUBJECTIVE AND OBJECTIVE BOX
Interval Events:  - on 3L NC  - sob improving, no cough or chest pain      REVIEW OF SYSTEMS:  Negative except as documented above.      OBJECTIVE:  ICU Vital Signs Last 24 Hrs  T(C): 38.1 (27 Jun 2023 05:05), Max: 38.1 (27 Jun 2023 05:05)  T(F): 100.6 (27 Jun 2023 05:05), Max: 100.6 (27 Jun 2023 05:05)  HR: 110 (27 Jun 2023 04:57) (99 - 110)  BP: 127/63 (27 Jun 2023 04:57) (112/59 - 128/62)  BP(mean): --  ABP: --  ABP(mean): --  RR: 20 (27 Jun 2023 04:57) (19 - 20)  SpO2: 94% (27 Jun 2023 04:57) (94% - 98%)    O2 Parameters below as of 27 Jun 2023 04:57  Patient On (Oxygen Delivery Method): nasal cannula  O2 Flow (L/min): 3            CAPILLARY BLOOD GLUCOSE      POCT Blood Glucose.: 148 mg/dL (27 Jun 2023 08:22)      PHYSICAL EXAM:  General: NAD  HEENT:  EOMI, sclera anicteric, moist mucus membranes  Neck: supple  Cardiovascular: RRR  Respiratory: CTAB, no wheezes, crackles, or rhonci  Abdomen: soft, nontender  Extremities: warm and well perfused, no edema, no clubbing  Skin: no rashes  Neurological: no focal deficits    HOSPITAL MEDICATIONS:  MEDICATIONS  (STANDING):  dextrose 5%. 1000 milliLiter(s) (50 mL/Hr) IV Continuous <Continuous>  dextrose 50% Injectable 25 Gram(s) IV Push once  enoxaparin Injectable 40 milliGRAM(s) SubCutaneous every 24 hours  folic acid 1 milliGRAM(s) Oral daily  glucagon  Injectable 1 milliGRAM(s) IntraMuscular once  insulin glargine Injectable (LANTUS) 20 Unit(s) SubCutaneous at bedtime  insulin lispro (ADMELOG) corrective regimen sliding scale   SubCutaneous at bedtime  insulin lispro (ADMELOG) corrective regimen sliding scale   SubCutaneous three times a day before meals  insulin lispro Injectable (ADMELOG) 15 Unit(s) SubCutaneous three times a day before meals  methylPREDNISolone sodium succinate Injectable 60 milliGRAM(s) IV Push daily  piperacillin/tazobactam IVPB.. 3.375 Gram(s) IV Intermittent every 8 hours  remdesivir  IVPB   IV Intermittent   remdesivir  IVPB 100 milliGRAM(s) IV Intermittent every 24 hours    MEDICATIONS  (PRN):  acetaminophen     Tablet .. 650 milliGRAM(s) Oral every 6 hours PRN Temp greater or equal to 38C (100.4F), Mild Pain (1 - 3)  aluminum hydroxide/magnesium hydroxide/simethicone Suspension 30 milliLiter(s) Oral every 4 hours PRN Dyspepsia  dextrose Oral Gel 15 Gram(s) Oral once PRN Blood Glucose LESS THAN 70 milliGRAM(s)/deciliter  guaiFENesin  milliGRAM(s) Oral every 12 hours PRN Cough  melatonin 3 milliGRAM(s) Oral at bedtime PRN Insomnia  ondansetron Injectable 4 milliGRAM(s) IV Push every 8 hours PRN Nausea and/or Vomiting      LABS:                        7.4    7.71  )-----------( 499      ( 27 Jun 2023 06:28 )             24.5     Hgb Trend: 7.4<--, 7.3<--, 7.4<--, 7.6<--, 8.5<--  06-27    137  |  100  |  9   ----------------------------<  108<H>  3.1<L>   |  26  |  0.50    Ca    9.0      27 Jun 2023 06:28  Phos  3.8     06-27  Mg     2.10     06-27    TPro  5.1<L>  /  Alb  2.9<L>  /  TBili  0.5  /  DBili  x   /  AST  16  /  ALT  15  /  AlkPhos  89  06-27    Creatinine Trend: 0.50<--, 0.58<--, 0.54<--, 0.36<--, 0.41<--, 0.36<--  PT/INR - ( 27 Jun 2023 06:28 )   PT: 12.9 sec;   INR: 1.11 ratio           Urinalysis Basic - ( 27 Jun 2023 06:28 )    Color: x / Appearance: x / SG: x / pH: x  Gluc: 108 mg/dL / Ketone: x  / Bili: x / Urobili: x   Blood: x / Protein: x / Nitrite: x   Leuk Esterase: x / RBC: x / WBC x   Sq Epi: x / Non Sq Epi: x / Bacteria: x            MICROBIOLOGY:       RADIOLOGY:  [x] Reviewed and interpreted by me

## 2023-06-28 ENCOUNTER — NON-APPOINTMENT (OUTPATIENT)
Age: 58
End: 2023-06-28

## 2023-06-28 DIAGNOSIS — D83.8 OTHER COMMON VARIABLE IMMUNODEFICIENCIES: ICD-10-CM

## 2023-06-28 LAB
ANION GAP SERPL CALC-SCNC: 13 MMOL/L — SIGNIFICANT CHANGE UP (ref 7–14)
BUN SERPL-MCNC: 16 MG/DL — SIGNIFICANT CHANGE UP (ref 7–23)
CALCIUM SERPL-MCNC: 9.2 MG/DL — SIGNIFICANT CHANGE UP (ref 8.4–10.5)
CHLORIDE SERPL-SCNC: 102 MMOL/L — SIGNIFICANT CHANGE UP (ref 98–107)
CO2 SERPL-SCNC: 27 MMOL/L — SIGNIFICANT CHANGE UP (ref 22–31)
CREAT SERPL-MCNC: 0.58 MG/DL — SIGNIFICANT CHANGE UP (ref 0.5–1.3)
CULTURE RESULTS: SIGNIFICANT CHANGE UP
CULTURE RESULTS: SIGNIFICANT CHANGE UP
EGFR: 105 ML/MIN/1.73M2 — SIGNIFICANT CHANGE UP
GLUCOSE BLDC GLUCOMTR-MCNC: 143 MG/DL — HIGH (ref 70–99)
GLUCOSE BLDC GLUCOMTR-MCNC: 145 MG/DL — HIGH (ref 70–99)
GLUCOSE BLDC GLUCOMTR-MCNC: 146 MG/DL — HIGH (ref 70–99)
GLUCOSE BLDC GLUCOMTR-MCNC: 220 MG/DL — HIGH (ref 70–99)
GLUCOSE BLDC GLUCOMTR-MCNC: 96 MG/DL — SIGNIFICANT CHANGE UP (ref 70–99)
GLUCOSE SERPL-MCNC: 75 MG/DL — SIGNIFICANT CHANGE UP (ref 70–99)
HCT VFR BLD CALC: 24.6 % — LOW (ref 34.5–45)
HGB BLD-MCNC: 7.4 G/DL — LOW (ref 11.5–15.5)
HIV 1+2 AB+HIV1 P24 AG SERPL QL IA: SIGNIFICANT CHANGE UP
MAGNESIUM SERPL-MCNC: 2.2 MG/DL — SIGNIFICANT CHANGE UP (ref 1.6–2.6)
MCHC RBC-ENTMCNC: 17.6 PG — LOW (ref 27–34)
MCHC RBC-ENTMCNC: 30.1 GM/DL — LOW (ref 32–36)
MCV RBC AUTO: 58.4 FL — LOW (ref 80–100)
MEV IGG SER-ACNC: 19.8 AU/ML — SIGNIFICANT CHANGE UP
MEV IGG+IGM SER-IMP: POSITIVE — SIGNIFICANT CHANGE UP
MUV AB SER-ACNC: POSITIVE — SIGNIFICANT CHANGE UP
MUV IGG FLD-ACNC: 43.9 AU/ML — SIGNIFICANT CHANGE UP
NRBC # BLD: 0 /100 WBCS — SIGNIFICANT CHANGE UP (ref 0–0)
NRBC # FLD: 0.07 K/UL — HIGH (ref 0–0)
PHOSPHATE SERPL-MCNC: 4.2 MG/DL — SIGNIFICANT CHANGE UP (ref 2.5–4.5)
PLATELET # BLD AUTO: 538 K/UL — HIGH (ref 150–400)
POTASSIUM SERPL-MCNC: 3.2 MMOL/L — LOW (ref 3.5–5.3)
POTASSIUM SERPL-SCNC: 3.2 MMOL/L — LOW (ref 3.5–5.3)
RBC # BLD: 4.21 M/UL — SIGNIFICANT CHANGE UP (ref 3.8–5.2)
RBC # FLD: 21.4 % — HIGH (ref 10.3–14.5)
RUBV IGG SER-ACNC: 2.2 INDEX — SIGNIFICANT CHANGE UP
RUBV IGG SER-IMP: POSITIVE — SIGNIFICANT CHANGE UP
SODIUM SERPL-SCNC: 142 MMOL/L — SIGNIFICANT CHANGE UP (ref 135–145)
SPECIMEN SOURCE: SIGNIFICANT CHANGE UP
SPECIMEN SOURCE: SIGNIFICANT CHANGE UP
STRIA MUS AB SER-ACNC: SIGNIFICANT CHANGE UP
TROPONIN T, HIGH SENSITIVITY RESULT: <6 NG/L — SIGNIFICANT CHANGE UP
WBC # BLD: 7.85 K/UL — SIGNIFICANT CHANGE UP (ref 3.8–10.5)
WBC # FLD AUTO: 7.85 K/UL — SIGNIFICANT CHANGE UP (ref 3.8–10.5)

## 2023-06-28 PROCEDURE — 99233 SBSQ HOSP IP/OBS HIGH 50: CPT

## 2023-06-28 PROCEDURE — 99233 SBSQ HOSP IP/OBS HIGH 50: CPT | Mod: 25

## 2023-06-28 PROCEDURE — 99232 SBSQ HOSP IP/OBS MODERATE 35: CPT

## 2023-06-28 PROCEDURE — 99254 IP/OBS CNSLTJ NEW/EST MOD 60: CPT

## 2023-06-28 RX ORDER — POTASSIUM CHLORIDE 20 MEQ
40 PACKET (EA) ORAL ONCE
Refills: 0 | Status: COMPLETED | OUTPATIENT
Start: 2023-06-28 | End: 2023-06-28

## 2023-06-28 RX ORDER — INSULIN GLARGINE 100 [IU]/ML
13 INJECTION, SOLUTION SUBCUTANEOUS AT BEDTIME
Refills: 0 | Status: DISCONTINUED | OUTPATIENT
Start: 2023-06-28 | End: 2023-06-29

## 2023-06-28 RX ADMIN — Medication 15 UNIT(S): at 08:49

## 2023-06-28 RX ADMIN — Medication 4: at 18:14

## 2023-06-28 RX ADMIN — INSULIN GLARGINE 13 UNIT(S): 100 INJECTION, SOLUTION SUBCUTANEOUS at 22:21

## 2023-06-28 RX ADMIN — Medication 15 UNIT(S): at 13:06

## 2023-06-28 RX ADMIN — Medication 60 MILLIGRAM(S): at 06:12

## 2023-06-28 RX ADMIN — Medication 15 UNIT(S): at 18:15

## 2023-06-28 RX ADMIN — Medication 1 MILLIGRAM(S): at 13:07

## 2023-06-28 RX ADMIN — PIPERACILLIN AND TAZOBACTAM 25 GRAM(S): 4; .5 INJECTION, POWDER, LYOPHILIZED, FOR SOLUTION INTRAVENOUS at 06:12

## 2023-06-28 RX ADMIN — ENOXAPARIN SODIUM 40 MILLIGRAM(S): 100 INJECTION SUBCUTANEOUS at 18:16

## 2023-06-28 RX ADMIN — Medication 40 MILLIEQUIVALENT(S): at 08:50

## 2023-06-28 RX ADMIN — REMDESIVIR 200 MILLIGRAM(S): 5 INJECTION INTRAVENOUS at 13:10

## 2023-06-28 RX ADMIN — PIPERACILLIN AND TAZOBACTAM 25 GRAM(S): 4; .5 INJECTION, POWDER, LYOPHILIZED, FOR SOLUTION INTRAVENOUS at 14:33

## 2023-06-28 NOTE — PROGRESS NOTE ADULT - SUBJECTIVE AND OBJECTIVE BOX
Chief Complaint: DM 2, steroid induced hyperglycemia    History: Patient seen at bedside. Noted with tightly controlled glucose this AM; patient received lower dose of Lantus last night (16 units), however serum glucose resulted 75 mg/dl. Patient denies symptoms of hypoglycemia  Remains on Solumedrol 60 mg daily    MEDICATIONS  (STANDING):  dextrose 5%. 1000 milliLiter(s) (50 mL/Hr) IV Continuous <Continuous>  dextrose 50% Injectable 25 Gram(s) IV Push once  enoxaparin Injectable 40 milliGRAM(s) SubCutaneous every 24 hours  folic acid 1 milliGRAM(s) Oral daily  glucagon  Injectable 1 milliGRAM(s) IntraMuscular once  insulin lispro (ADMELOG) corrective regimen sliding scale   SubCutaneous at bedtime  insulin lispro (ADMELOG) corrective regimen sliding scale   SubCutaneous three times a day before meals  insulin lispro Injectable (ADMELOG) 15 Unit(s) SubCutaneous three times a day before meals  methylPREDNISolone sodium succinate Injectable 60 milliGRAM(s) IV Push daily  piperacillin/tazobactam IVPB.. 3.375 Gram(s) IV Intermittent every 8 hours  remdesivir  IVPB   IV Intermittent   remdesivir  IVPB 100 milliGRAM(s) IV Intermittent every 24 hours    MEDICATIONS  (PRN):  acetaminophen     Tablet .. 650 milliGRAM(s) Oral every 6 hours PRN Temp greater or equal to 38C (100.4F), Mild Pain (1 - 3)  aluminum hydroxide/magnesium hydroxide/simethicone Suspension 30 milliLiter(s) Oral every 4 hours PRN Dyspepsia  dextrose Oral Gel 15 Gram(s) Oral once PRN Blood Glucose LESS THAN 70 milliGRAM(s)/deciliter  guaiFENesin  milliGRAM(s) Oral every 12 hours PRN Cough  melatonin 3 milliGRAM(s) Oral at bedtime PRN Insomnia  ondansetron Injectable 4 milliGRAM(s) IV Push every 8 hours PRN Nausea and/or Vomiting    Allergies  amoxicillin (Rash (Mild to Mod))    Review of Systems:  Cardiovascular: No chest pain  Respiratory: No SOB  GI: No nausea, vomiting  Endocrine: no hypoglycemia     PHYSICAL EXAM:  VITALS: T(C): 36.8 (06-28-23 @ 13:37)  T(F): 98.2 (06-28-23 @ 13:37), Max: 98.5 (06-27-23 @ 19:35)  HR: 82 (06-28-23 @ 13:37) (76 - 97)  BP: 103/65 (06-28-23 @ 13:37) (103/65 - 138/80)  RR:  (18 - 20)  SpO2:  (96% - 100%)  Wt(kg): --  GENERAL: NAD  EYES: No proptosis, anicteric  HEENT:  Atraumatic, Normocephalic  RESPIRATORY: unlabored respirations     CAPILLARY BLOOD GLUCOSE    POCT Blood Glucose.: 146 mg/dL (28 Jun 2023 12:43)  POCT Blood Glucose.: 143 mg/dL (28 Jun 2023 08:45)  POCT Blood Glucose.: 96 mg/dL (28 Jun 2023 06:19)  POCT Blood Glucose.: 106 mg/dL (27 Jun 2023 22:27)  POCT Blood Glucose.: 137 mg/dL (27 Jun 2023 17:18)      06-28    142  |  102  |  16  ----------------------------<  75  3.2<L>   |  27  |  0.58    eGFR: 105    Ca    9.2      06-28  Mg     2.20     06-28  Phos  4.2     06-28    TPro  5.1<L>  /  Alb  2.9<L>  /  TBili  0.5  /  DBili  x   /  AST  16  /  ALT  15  /  AlkPhos  89  06-27      Thyroid Function Tests:  06-22 @ 19:35 TSH 1.03 FreeT4 -- T3 -- Anti TPO -- Anti Thyroglobulin Ab -- TSI --      Anion Gap: 13 mmol/L (06-28-23 @ 03:47)  Anion Gap: 11 mmol/L (06-27-23 @ 06:28)  Anion Gap: 13 mmol/L (06-26-23 @ 05:35)      A1C with Estimated Average Glucose Result: 11.1 % (06-24-23 @ 06:27)      Diet, Regular:   Consistent Carbohydrate Evening Snack (CSTCHOSN)  DASH/TLC Sodium & Cholesterol Restricted (DASH) (06-24-23 @ 15:48)

## 2023-06-28 NOTE — PROVIDER CONTACT NOTE (OTHER) - DATE AND TIME:
23-Jun-2023 15:28
23-Jun-2023 21:10
23-Jun-2023 18:30
23-Jun-2023 20:00
26-Jun-2023 05:24
27-Jun-2023 22:30
24-Jun-2023 12:45

## 2023-06-28 NOTE — PROGRESS NOTE ADULT - SUBJECTIVE AND OBJECTIVE BOX
Per Dr. Trey Guillermo pt to take 3 tablets daily. Pt notified. Script updated. Interval Events:  - on 3L NC   - s/p convalescent plasma yesterday  - afebrile       REVIEW OF SYSTEMS:  Negative except as documented above.      OBJECTIVE:  ICU Vital Signs Last 24 Hrs  T(C): 36.8 (28 Jun 2023 13:37), Max: 36.9 (27 Jun 2023 19:35)  T(F): 98.2 (28 Jun 2023 13:37), Max: 98.5 (27 Jun 2023 19:35)  HR: 82 (28 Jun 2023 13:37) (76 - 97)  BP: 103/65 (28 Jun 2023 13:37) (103/65 - 138/80)  BP(mean): --  ABP: --  ABP(mean): --  RR: 18 (28 Jun 2023 13:37) (18 - 20)  SpO2: 100% (28 Jun 2023 13:37) (96% - 100%)    O2 Parameters below as of 28 Jun 2023 13:37  Patient On (Oxygen Delivery Method): nasal cannula              CAPILLARY BLOOD GLUCOSE      POCT Blood Glucose.: 220 mg/dL (28 Jun 2023 17:39)      PHYSICAL EXAM:  General: NAD  HEENT:  EOMI, sclera anicteric, moist mucus membranes  Neck: supple  Cardiovascular: RRR  Respiratory: CTAB, no wheezes, crackles, or rhonci  Abdomen: soft, nontender  Extremities: warm and well perfused, no edema, no clubbing  Skin: no rashes  Neurological: no focal deficits    HOSPITAL MEDICATIONS:  MEDICATIONS  (STANDING):  dextrose 5%. 1000 milliLiter(s) (50 mL/Hr) IV Continuous <Continuous>  dextrose 50% Injectable 25 Gram(s) IV Push once  enoxaparin Injectable 40 milliGRAM(s) SubCutaneous every 24 hours  folic acid 1 milliGRAM(s) Oral daily  glucagon  Injectable 1 milliGRAM(s) IntraMuscular once  insulin glargine Injectable (LANTUS) 13 Unit(s) SubCutaneous at bedtime  insulin lispro (ADMELOG) corrective regimen sliding scale   SubCutaneous at bedtime  insulin lispro (ADMELOG) corrective regimen sliding scale   SubCutaneous three times a day before meals  insulin lispro Injectable (ADMELOG) 15 Unit(s) SubCutaneous three times a day before meals  methylPREDNISolone sodium succinate Injectable 50 milliGRAM(s) IV Push daily  piperacillin/tazobactam IVPB.. 3.375 Gram(s) IV Intermittent every 8 hours  remdesivir  IVPB   IV Intermittent   remdesivir  IVPB 100 milliGRAM(s) IV Intermittent every 24 hours    MEDICATIONS  (PRN):  acetaminophen     Tablet .. 650 milliGRAM(s) Oral every 6 hours PRN Temp greater or equal to 38C (100.4F), Mild Pain (1 - 3)  aluminum hydroxide/magnesium hydroxide/simethicone Suspension 30 milliLiter(s) Oral every 4 hours PRN Dyspepsia  dextrose Oral Gel 15 Gram(s) Oral once PRN Blood Glucose LESS THAN 70 milliGRAM(s)/deciliter  guaiFENesin  milliGRAM(s) Oral every 12 hours PRN Cough  melatonin 3 milliGRAM(s) Oral at bedtime PRN Insomnia  ondansetron Injectable 4 milliGRAM(s) IV Push every 8 hours PRN Nausea and/or Vomiting      LABS:                        7.4    7.85  )-----------( 538      ( 28 Jun 2023 03:47 )             24.6     Hgb Trend: 7.4<--, 7.4<--, 7.3<--, 7.4<--, 7.6<--  06-28    142  |  102  |  16  ----------------------------<  75  3.2<L>   |  27  |  0.58    Ca    9.2      28 Jun 2023 03:47  Phos  4.2     06-28  Mg     2.20     06-28    TPro  5.1<L>  /  Alb  2.9<L>  /  TBili  0.5  /  DBili  x   /  AST  16  /  ALT  15  /  AlkPhos  89  06-27    Creatinine Trend: 0.58<--, 0.50<--, 0.58<--, 0.54<--, 0.36<--, 0.41<--  PT/INR - ( 27 Jun 2023 06:28 )   PT: 12.9 sec;   INR: 1.11 ratio           Urinalysis Basic - ( 28 Jun 2023 03:47 )    Color: x / Appearance: x / SG: x / pH: x  Gluc: 75 mg/dL / Ketone: x  / Bili: x / Urobili: x   Blood: x / Protein: x / Nitrite: x   Leuk Esterase: x / RBC: x / WBC x   Sq Epi: x / Non Sq Epi: x / Bacteria: x            MICROBIOLOGY:     Culture - Blood (collected 26 Jun 2023 05:50)  Source: .Blood Blood-Peripheral  Preliminary Report (28 Jun 2023 10:01):    No growth at 48 Hours    Culture - Blood (collected 26 Jun 2023 05:39)  Source: .Blood Blood-Peripheral  Preliminary Report (28 Jun 2023 10:01):    No growth at 48 Hours        RADIOLOGY:  [x] Reviewed and interpreted by me

## 2023-06-28 NOTE — PROVIDER CONTACT NOTE (OTHER) - BACKGROUND
58 F admitted for SOB. PMHx of lupus, thymoma, and dm2
Pt admitted for sOB< PMHx DM and lupus, being treated for COVID.
58 F admitted for SOB. PMHx of lupus, thymoma, and dm2
58 F admitted for SOB. PMHx of lupus, thymoma, and dm2
Pt. adm w/SOB and tachycardia . Hx of systemic lupus, DM2, Thymoma. Pt. on methylprednisone, vancomycin and zosyn.
58 Y.O F admitted for SOB. PMhx of DM, lupus, and thymoma
Pt. adm w/SOB and tachycardia . Hx of systemic lupus, DM2, Thymoma.

## 2023-06-28 NOTE — PROVIDER CONTACT NOTE (OTHER) - REASON
Patient tachycardic
HR was 132.
Pt has temperature of 100.7F and HR of 117.
Labs OK To collect for 6/24/23 tomorrow
Patient tachycardic with mild fever
Pt , due for 20UN lantus.
hyperglycemia

## 2023-06-28 NOTE — PROVIDER CONTACT NOTE (OTHER) - NAME OF MD/NP/PA/DO NOTIFIED:
DAMEON- Angelica Stevenson
Vickie Leslie, ACP
hSelia Fletcher PA
Wilder Gonzalez
Wilder Gonzalez
DAMEON- Angelica Stevenson
Wilder Gonzalez

## 2023-06-28 NOTE — PROGRESS NOTE ADULT - ASSESSMENT
59 yo F with PMH SLE (dx ?12 years ago, not on tx for past 5 years, initially w/ joint pain and pleural effusion), new dx thymoma, recent admission to [facility] x 2 for shortness of breath (initially with covid, then represented with continued shortness of breath) who presents with shortness of breath and abnormal CT chest.     # SLE  # b/l ground glass and reticular opacities  # chronic hypoxemic respiratory failure  # thymoma  # covid  Differential is broad and difficult to narrow without prior records. Currently, CT chest shows diffuse bilateral ground glass and reticular opacities. Presumably, these were not present during admission to [facility] as pt had bronchoscopy and mediastinal mass biopsy w/o lung biopsy (this probably would have been done if there were parenchymal abnormalities). However, she was discharged on 3L home O2 which is her current oxygen requirement. She has difficulty speaking in full sentences, though this seems to be due to choking sensation rather than sob.   It seems that she is having difficulty managing secretions and may have ptosis on exam, ?myasthenia.  Rheumatology concerned about MDA-5 disease in setting of rapidly progressive lung disease and fever.  - procal 0.09 -- low suspicion for pneumonia but will cover broadly for now   - BNP 43  - RVP negative initially, now covid +  - CXR 6/22: b/l reticular nodular opacities  - CTA Chest 6/22: no PE, 3.9 x 4.9 cm mediastinal mass corresponding to known thymoma. Bilateral diffuse groundglass and reticular opacities with mild bronchiectasis        Plan:  - f/u [facility] records w/ prior CT/MRI for comparison  - c/w empiric abx (on zosyn), complete 7 day course  - duonebs q6  - please provide with aerobika (order as airway clearance device) to use w/ nebulized meds   - sputum cx  - f/u TTE  - swallow eval  - agree w/ steroids per rheum (solumedrol 60 qd), would decrease solumedrol 50 mg qd  - f/u MDA-5 ab, anti Glory, myomarker panel, dsDNA, ANTOINE, Sjogren, C3, C4, ferritin, myasthenia serologies  - quantigeron negative   - immunoglobulins low (Igg, iga, and igm) - this is likely due to thymoma; immunology c/s re: goods syndrome, will likely benefit from IVIG outpatient   - continue remdesivir, convalescent plasma given low immunoglobulin levels   - covid spike ab positive, nucleocapsid ab negative

## 2023-06-28 NOTE — PROGRESS NOTE ADULT - PROBLEM SELECTOR PLAN 1
- pt has been admitted twice to [facility] for shortness of breath since May  - CTA scan with no PE, but 3.9 x 4.9 cm mediastinal mass corresponding to known thymoma. Nonspecific groundglass and reticular opacities with mild bronchiectasis in both lungs may be sequela of atypical infection or related to chronic interstitial disease given history of SLE.  - per patient she is on home oxygen at 3L   - c/w Xopenex ATC   - on Solumedrol 60mg daily   - started on Mucomyst, incentive spirometry    - c/w antibiotics as below  - aerobika for airway clearance  - check TTE  now covid +, on steroids for poss rapidly progressive lung dz due to SLE; start remdes

## 2023-06-28 NOTE — CONSULT NOTE ADULT - REASON FOR ADMISSION
Shortness of Breath

## 2023-06-28 NOTE — PROVIDER CONTACT NOTE (OTHER) - ASSESSMENT
AOx4. patient temperature coming down from fever, however pt still remains tachycardic. pt c/o of mild pressure in chest. sister and pt at bedside states that the tachycardia is not new and pt was tachycardic at home which is why they're in the hospital. no other s/s noted. pt remains on 3 LPM via NC.
Patient in no distress at this time. Labs ordered from Rheumatology. Patient was previously stuck 2x during my shift for labs. Additional labs were ordered by rheumatology, RN request for labs to done at a later time or rescheduled for tomorrow 6/24/23 to avoid multiple sticks to patient. Pt prefers for labs to be done tomorrow. Dr. Kauffman confirmed at bedside that labs can be done tomorrow if preferred.
Pt only notes left arm warmness.
Pt. asymptomatic
AOx4. patient warm to touch, vitals taken, pt presents with fever. no distress present.
Pt AOx4, VSS with no acute distress noted.
AOx4. patient temperature is 98.1. Pt still remains tachycardic. pt c/o of mild pressure in chest.

## 2023-06-28 NOTE — CONSULT NOTE ADULT - SUBJECTIVE AND OBJECTIVE BOX
Patient is a 58y old  Female who presents with a chief complaint of Shortness of Breath (28 Jun 2023 15:28)    HPI:  58-year-old female with a past medical history significant for SLE, diabetes, recently diagnosed thymoma who was admitted to the hospital due to shortness of breath.    Patient initially developed shortness of breath in May, was evaluated by pulmonology on outpatient basis who referred patient to present to [facility] ED.    Patient previously with COVID in May 2023.  SLE history diagnosed more than 10 years ago, had been taking Droxia chloroquine however since 2020 has not been seen by rheumatology and not on any SLE medications.    Upon admission, requiring supplemental oxygen and is noted to be tachypneic with hypoxia.  Also febrile to 102 Fahrenheit.  CTA obtained did not show pulmonary embolism however thymomas redemonstrated.  Initial RVP positive however subsuquent with COVID positive. Patient was admitted and started on levofloxacin. CXR with reticular opacities. CTA thorax with groundglass and reticular opacities bilaterally.    Following admission, evaluated by rheumatology, pulmonology, CT surgery, endocrinology, neurology, immunology.    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  No surgical intervention planned per CT surgery.       prior hospital charts reviewed [x  ]  primary team notes reviewed [ x ]  other consultant notes reviewed [x  ]    PAST MEDICAL & SURGICAL HISTORY:  H/O thymoma      Type 2 diabetes mellitus      Systemic lupus      S/P hysterectomy          Allergies  amoxicillin (Rash (Mild to Mod))    ANTIMICROBIALS (past 90 days)  MEDICATIONS  (STANDING):    levoFLOXacin IVPB   500 milliGRAM(s) IV Intermittent (06-23-23 @ 12:45)    piperacillin/tazobactam IVPB..   25 mL/Hr IV Intermittent (06-28-23 @ 14:33)   25 mL/Hr IV Intermittent (06-28-23 @ 06:12)   25 mL/Hr IV Intermittent (06-27-23 @ 21:55)   25 mL/Hr IV Intermittent (06-27-23 @ 14:08)   25 mL/Hr IV Intermittent (06-27-23 @ 05:16)   25 mL/Hr IV Intermittent (06-26-23 @ 22:34)   25 mL/Hr IV Intermittent (06-26-23 @ 13:03)   25 mL/Hr IV Intermittent (06-26-23 @ 05:17)   25 mL/Hr IV Intermittent (06-25-23 @ 21:45)   25 mL/Hr IV Intermittent (06-25-23 @ 14:34)   25 mL/Hr IV Intermittent (06-25-23 @ 05:15)   25 mL/Hr IV Intermittent (06-24-23 @ 22:23)   25 mL/Hr IV Intermittent (06-24-23 @ 13:03)   25 mL/Hr IV Intermittent (06-24-23 @ 05:21)   25 mL/Hr IV Intermittent (06-23-23 @ 23:03)    piperacillin/tazobactam IVPB...   200 mL/Hr IV Intermittent (06-23-23 @ 00:35)    remdesivir  IVPB   200 milliGRAM(s) IV Intermittent (06-26-23 @ 18:06)    remdesivir  IVPB   200 mL/Hr IV Intermittent (06-28-23 @ 13:10)   200 mL/Hr IV Intermittent (06-27-23 @ 12:47)    vancomycin  IVPB   250 mL/Hr IV Intermittent (06-24-23 @ 20:18)   250 mL/Hr IV Intermittent (06-24-23 @ 08:50)   250 mL/Hr IV Intermittent (06-23-23 @ 20:45)    vancomycin  IVPB   250 mL/Hr IV Intermittent (06-25-23 @ 09:53)    vancomycin  IVPB.   250 mL/Hr IV Intermittent (06-23-23 @ 01:17)        piperacillin/tazobactam IVPB.. 3.375 every 8 hours  remdesivir  IVPB    remdesivir  IVPB 100 every 24 hours    MEDICATIONS  (STANDING):  acetaminophen     Tablet .. 650 every 6 hours PRN  aluminum hydroxide/magnesium hydroxide/simethicone Suspension 30 every 4 hours PRN  dextrose 50% Injectable 25 once  dextrose Oral Gel 15 once PRN  enoxaparin Injectable 40 every 24 hours  glucagon  Injectable 1 once  guaiFENesin  every 12 hours PRN  insulin glargine Injectable (LANTUS) 13 at bedtime  insulin lispro (ADMELOG) corrective regimen sliding scale  at bedtime  insulin lispro (ADMELOG) corrective regimen sliding scale  three times a day before meals  insulin lispro Injectable (ADMELOG) 15 three times a day before meals  melatonin 3 at bedtime PRN  methylPREDNISolone sodium succinate Injectable 50 daily  ondansetron Injectable 4 every 8 hours PRN    SOCIAL HISTORY:     Denies tobacco/alcohol/illicit drug use  FAMILY HISTORY:  FH: type 2 diabetes      REVIEW OF SYSTEMS  [  ] ROS unobtainable because:    [ x ] All other systems negative except as noted below:	    Constitutional:  [ ] fever [x ] chills  [ ] weight loss  [ ] weakness  Skin:  [ ] rash [ ] phlebitis	  Eyes: [ ] icterus [ ] pain  [ ] discharge	  ENMT: [ ] sore throat  [ ] thrush [ ] ulcers [ ] exudates  Respiratory: [x ] dyspnea [ ] hemoptysis [ ] cough [ ] sputum	  Cardiovascular:  [ x] chest pain [ ] palpitations [ ] edema	  Gastrointestinal:  [ ] nausea [ ] vomiting [ ] diarrhea [ ] constipation [ ] pain	  Genitourinary:  [ ] dysuria [ ] frequency [ ] hematuria [ ] discharge [ ] flank pain  [ ] incontinence  Musculoskeletal:  [ ] myalgias [ ] arthralgias [ ] arthritis  [ ] back pain  Neurological:  [ ] headache [ ] seizures  [ ] confusion/altered mental status  Psychiatric:  [ ] anxiety [ ] depression	  Hematology/Lymphatics:  [ ] lymphadenopathy  Endocrine:  [ ] adrenal [ ] thyroid  Allergic/Immunologic:	 [ ] transplant [ ] seasonal    Vital Signs Last 24 Hrs  T(F): 98.2 (06-28-23 @ 13:37), Max: 102 (06-22-23 @ 21:38)  Vital Signs Last 24 Hrs  HR: 82 (06-28-23 @ 13:37) (76 - 97)  BP: 103/65 (06-28-23 @ 13:37) (103/65 - 138/80)  RR: 18 (06-28-23 @ 13:37)  SpO2: 100% (06-28-23 @ 13:37) (96% - 100%)  Wt(kg): --    PHYSICAL EXAM:  Constitutional: Comfortable, no distress, NC cannula in place  HEAD/EYES: anicteric, no conjunctival injection  ENT:  supple, no thrush  Cardiovascular:   normal S1, S2, no murmur, no edema  Respiratory:  clear BS bilaterally, no wheezes, no rales  GI:  soft, non-tender, normal bowel sounds  :  no andres, no CVA tenderness  Musculoskeletal:  no synovitis, normal ROM  Neurologic: awake and alert, normal strength, no focal findings  Skin:  no phlebitis  Heme/Onc: no lymphadenopathy   Psychiatric:  awake, alert, appropriate mood                            7.4    7.85  )-----------( 538      ( 28 Jun 2023 03:47 )             24.6   06-28    142  |  102  |  16  ----------------------------<  75  3.2<L>   |  27  |  0.58    Ca    9.2      28 Jun 2023 03:47  Phos  4.2     06-28  Mg     2.20     06-28    TPro  5.1<L>  /  Alb  2.9<L>  /  TBili  0.5  /  DBili  x   /  AST  16  /  ALT  15  /  AlkPhos  89  06-27    Urinalysis Basic - ( 28 Jun 2023 03:47 )    Color: x / Appearance: x / SG: x / pH: x  Gluc: 75 mg/dL / Ketone: x  / Bili: x / Urobili: x   Blood: x / Protein: x / Nitrite: x   Leuk Esterase: x / RBC: x / WBC x   Sq Epi: x / Non Sq Epi: x / Bacteria: x    MICROBIOLOGY:Vancomycin Level, Trough: 6.9 (06-25 @ 07:06)    Culture - Blood (collected 26 Jun 2023 05:50)  Source: .Blood Blood-Peripheral  Preliminary Report (28 Jun 2023 10:01):    No growth at 48 Hours    Culture - Blood (collected 26 Jun 2023 05:39)  Source: .Blood Blood-Peripheral  Preliminary Report (28 Jun 2023 10:01):    No growth at 48 Hours              Rapid RVP Result: Detected (06-26 @ 06:45)  Rapid RVP Result: NotDetec (06-22 @ 19:35)      RADIOLOGY:  imaging below personally reviewed and agree with findings

## 2023-06-28 NOTE — CONSULT NOTE ADULT - CONSULT REQUESTED DATE/TIME
23-Jun-2023 18:23
24-Jun-2023 18:09
23-Jun-2023 14:11
24-Jun-2023 15:14
28-Jun-2023 18:19
23-Jun-2023 17:12
27-Jun-2023 13:55

## 2023-06-28 NOTE — PROVIDER CONTACT NOTE (OTHER) - SITUATION
Labs OK To collect for 6/24/23 tomorrow
Pt has temperature of 100.7F and HR of 117.
Patient tachycardic
HR was 132.
Patient tachycardic with mild fever
Pt , due for 20UN lantus.
 repeat 419 at lunch

## 2023-06-28 NOTE — PROGRESS NOTE ADULT - ASSESSMENT
57 yo female adm for SLE on high-dose steroids with marked hyperglycemia. Pt was recently diagnosed with Type 2 DM on adm to [facility] for pulm issues, discharged on low-dose basal bolus. Admitted with SOB and started on Solumedrol, endocrine consulted for uncontrolled DM 2 (A1c 11.1%) with steroid induced hyperglycemia, now COVID+    1. DM 2 with hyperglycemia   A1c 11.1%  Home Regimen: Lantus 15 units SQ qHS and Admelog 5 units TID before meals; recently adjusted from Lantus 12 and Admelog 3/3/3  Glucose has improved with insulin adjustments and AG now WDL    While inpatient:  BG target 100-180 mg/dl  Reduce Lantus to 13 units SQ qHS (received 16 units last night instead of 20 units with tightly controlled FS)   Continue Admelog to 15 units SQ TID before meals (Hold if NPO/skips meal)   Continue Admelog MODERATE dose correctional scale before meals  Continue Admelog moderate dose scale at bedtime  Consistent carbohydrate diet, RD consult appreciated   Check BG before meals and bedtime  Hypoglycemia protocol   Please adjust IV ABX in dextrose to normal saline formulations if possible    Discharge Plan:  Resume basal/bolus insulin PENS, dose TBD  Reviewed with patient the relationship between steroids and blood glucose/insulin dosing, if on steroids or steroid taper as outpatient, her insulin doses will need to be adjusted accordingly  Ensure patient has glucometer, glucose test strips, lancets, alcohol swabs and insulin PEN needles   Followup with PCP, endocrinology, podiatry and opthalmology as outpatient  Followup with prior endocrinologist Dr. Misty Dominguez, [facility]    2. Steroid induced hyperglycemia  Currently on Solumedrol 60 mg daily  Please keep endocrine informed regarding steroid plans or taper  Patient with hx of Lupus - Patient denies chronic steroids; although was on Prednisone for about 1 month when first diagnosed. Would not withdraw steroid treatment abruptly, risk for AI  Patient advised of risk of steroid-induced osteoporosis, recommend bone density as outpatient    3. HTN  BP target less than 130/80  BP within target, not on antihypertensives  Continue to monitor  Microalbumin/creat ratio as outpatient    4. HLD  LDL target less than 70  LDL resulted 117  Recommend statin if no contraindication    Ju Julien  Nurse Practitioner  Division of Endocrinology & Diabetes  In house pager #[phone]/long range pager #[phone]    If before 9AM or after 6PM, or on weekends/holidays, please call endocrine answering service for assistance ([phone]).  For nonurgent matters email [email or web address] for assistance.

## 2023-06-28 NOTE — PROGRESS NOTE ADULT - SUBJECTIVE AND OBJECTIVE BOX
[facility] Division of Hospital Medicine  Gabbie Olmedo MD  Pager [phone]    Patient is a 58y old  Female who presents with a chief complaint of Shortness of Breath       SUBJECTIVE / OVERNIGHT EVENTS: overall doing better; assisted pt to bathroom, upon return to bed and reconnect to , sat 75% on 3L; approx 3-5 min pt back to 90%; mild distress which resolved in the same 3-5 min and able to speak with me in full sentences      MEDICATIONS  (STANDING):  dextrose 5%. 1000 milliLiter(s) (50 mL/Hr) IV Continuous <Continuous>  dextrose 50% Injectable 25 Gram(s) IV Push once  enoxaparin Injectable 40 milliGRAM(s) SubCutaneous every 24 hours  folic acid 1 milliGRAM(s) Oral daily  glucagon  Injectable 1 milliGRAM(s) IntraMuscular once  insulin lispro (ADMELOG) corrective regimen sliding scale   SubCutaneous three times a day before meals  insulin lispro (ADMELOG) corrective regimen sliding scale   SubCutaneous at bedtime  insulin lispro Injectable (ADMELOG) 15 Unit(s) SubCutaneous three times a day before meals  methylPREDNISolone sodium succinate Injectable 60 milliGRAM(s) IV Push daily  piperacillin/tazobactam IVPB.. 3.375 Gram(s) IV Intermittent every 8 hours  remdesivir  IVPB   IV Intermittent   remdesivir  IVPB 100 milliGRAM(s) IV Intermittent every 24 hours    MEDICATIONS  (PRN):  acetaminophen     Tablet .. 650 milliGRAM(s) Oral every 6 hours PRN Temp greater or equal to 38C (100.4F), Mild Pain (1 - 3)  aluminum hydroxide/magnesium hydroxide/simethicone Suspension 30 milliLiter(s) Oral every 4 hours PRN Dyspepsia  dextrose Oral Gel 15 Gram(s) Oral once PRN Blood Glucose LESS THAN 70 milliGRAM(s)/deciliter  guaiFENesin  milliGRAM(s) Oral every 12 hours PRN Cough  melatonin 3 milliGRAM(s) Oral at bedtime PRN Insomnia  ondansetron Injectable 4 milliGRAM(s) IV Push every 8 hours PRN Nausea and/or Vomiting      CAPILLARY BLOOD GLUCOSE  POCT Blood Glucose.: 143 mg/dL (28 Jun 2023 08:45)  POCT Blood Glucose.: 96 mg/dL (28 Jun 2023 06:19)  POCT Blood Glucose.: 106 mg/dL (27 Jun 2023 22:27)  POCT Blood Glucose.: 137 mg/dL (27 Jun 2023 17:18)      PHYSICAL EXAM:  Vital Signs Last 24 Hrs  T(F): 98.4 (28 Jun 2023 09:29), Max: 98.5 (27 Jun 2023 19:35)  HR: 87 (28 Jun 2023 09:29) (76 - 97)  BP: 118/68 (28 Jun 2023 09:29) (104/66 - 138/80)  RR: 18 (28 Jun 2023 09:29) (16 - 20)  SpO2: 97% (28 Jun 2023 09:29) (96% - 100%)    Parameters below as of 28 Jun 2023 09:29  Patient On (Oxygen Delivery Method): nasal cannula  O2 Flow (L/min): 3      CONSTITUTIONAL: NAD, appears comfortable  EYES: PERRLA; conjunctiva and sclera clear  ENMT: Moist oral mucosa; normal dentition  RESPIRATORY: Normal respiratory effort; grossly b/l AE, coarse BS  CARDIOVASCULAR: Regular rate and rhythm; No lower extremity edema  ABDOMEN: Nontender to palpation, normoactive bowel sounds  MUSCULOSKELETAL:  no clubbing or cyanosis of digits; no joint swelling or tenderness to palpation  PSYCH: A+O to person, place, and time; affect appropriate  NEUROLOGY: CN 2-12 are intact and symmetric; no gross sensory deficits   SKIN: No rashes; no palpable lesions    LABS:                        7.4    7.85  )-----------( 538      ( 28 Jun 2023 03:47 )             24.6     06-28    142  |  102  |  16  ----------------------------<  75  3.2<L>   |  27  |  0.58    Ca    9.2      28 Jun 2023 03:47  Phos  4.2     06-28  Mg     2.20     06-28    TPro  5.1<L>  /  Alb  2.9<L>  /  TBili  0.5  /  DBili  x   /  AST  16  /  ALT  15  /  AlkPhos  89  06-27    PT/INR - ( 27 Jun 2023 06:28 )   PT: 12.9 sec;   INR: 1.11 ratio               Urinalysis Basic - ( 28 Jun 2023 03:47 )    Color: x / Appearance: x / SG: x / pH: x  Gluc: 75 mg/dL / Ketone: x  / Bili: x / Urobili: x   Blood: x / Protein: x / Nitrite: x   Leuk Esterase: x / RBC: x / WBC x   Sq Epi: x / Non Sq Epi: x / Bacteria: x        Culture - Blood (collected 26 Jun 2023 05:50)  Source: .Blood Blood-Peripheral  Preliminary Report (28 Jun 2023 10:01):    No growth at 48 Hours    Culture - Blood (collected 26 Jun 2023 05:39)  Source: .Blood Blood-Peripheral  Preliminary Report (28 Jun 2023 10:01):    No growth at 48 Hours

## 2023-06-28 NOTE — CONSULT NOTE ADULT - ASSESSMENT
58-year-old female with a past medical history significant for SLE, diabetes, recently diagnosed thymoma who was admitted to the hospital due to shortness of breath.    #Abnormal imaging of the lungs  #Bilateral groundglass opacities, unclear etiology  #Thymoma, Good's syndrome    Recommendations  Overall, unclear if chest imaging findings are due to active COVID-19 infection (despite previous infection in May 2023), SLE pulmonary flare, possible Good syndrome, previous COVID infection imaging lag  Can continue with remdesivir at this time, plan for 5-day course  Continue with piperacillin/tazobactam for now however would limit course to 5 to 7 days  Obtain procalcitonin  COVID can play a role in autoantibody creation to interferons as multiple cases have been reported in the literature however significance is unclear (https://pubmed.ncbi.nlm.nih.gov/89159389/ / https://pubmed.ncbi.nlm.nih.gov/36320343/)  Would not plan for antibiotic prophylaxis at this time due to risks outweighing benefit   Any infection can cause hypogammaglobulinemia and this would likely have to be followed on an outpatient basis  Follow immunology, pulmonology, rheumatology input  Follow fever curve and WBC count    Juan Francisco Alexander MD  Division of Infectious Diseases

## 2023-06-28 NOTE — PROVIDER CONTACT NOTE (OTHER) - ACTION/TREATMENT ORDERED:
Provider to RN to give tylenol for chest pain. Provider made aware of chest pain and elevated HR.
tylenol prn given for fever. recheck vitals once fever breaks
ACP notified, will reduce PM Lantus for a one time dose.
Troponin added on to labs, EKG ordered, reassess chest pain. All other labs sent
Pt NPO for endocrine consult.  Sliding scale to be adjusted.
Provider wants PO tylenol to be given. 2 sets of blood cultures, RVP, and chest x-ray ordered.

## 2023-06-29 ENCOUNTER — NON-APPOINTMENT (OUTPATIENT)
Age: 58
End: 2023-06-29

## 2023-06-29 ENCOUNTER — TRANSCRIPTION ENCOUNTER (OUTPATIENT)
Age: 58
End: 2023-06-29

## 2023-06-29 LAB
GLUCOSE BLDC GLUCOMTR-MCNC: 166 MG/DL — HIGH (ref 70–99)
GLUCOSE BLDC GLUCOMTR-MCNC: 183 MG/DL — HIGH (ref 70–99)
GLUCOSE BLDC GLUCOMTR-MCNC: 223 MG/DL — HIGH (ref 70–99)
GLUCOSE BLDC GLUCOMTR-MCNC: 98 MG/DL — SIGNIFICANT CHANGE UP (ref 70–99)
HEMOGLOBIN INTERPRETATION: SIGNIFICANT CHANGE UP
HEMOGLOBIN INTERPRETATION: SIGNIFICANT CHANGE UP
HGB A MFR BLD: 93.5 % — LOW (ref 95–97.6)
HGB A MFR BLD: 96.5 % — SIGNIFICANT CHANGE UP (ref 95–97.6)
HGB A2 MFR BLD: 5.4 % — HIGH (ref 2.4–3.5)
HGB A2 MFR BLD: 5.5 % — HIGH (ref 2.4–3.5)
HGB F MFR BLD: 1.1 % — SIGNIFICANT CHANGE UP (ref 0–1.5)
HGB F MFR BLD: <1 % — SIGNIFICANT CHANGE UP (ref 0–1.5)

## 2023-06-29 PROCEDURE — 99233 SBSQ HOSP IP/OBS HIGH 50: CPT | Mod: 25

## 2023-06-29 PROCEDURE — 99232 SBSQ HOSP IP/OBS MODERATE 35: CPT

## 2023-06-29 RX ORDER — INSULIN LISPRO 100/ML
12 VIAL (ML) SUBCUTANEOUS
Refills: 0 | Status: DISCONTINUED | OUTPATIENT
Start: 2023-06-30 | End: 2023-06-30

## 2023-06-29 RX ORDER — INSULIN GLARGINE 100 [IU]/ML
15 INJECTION, SOLUTION SUBCUTANEOUS AT BEDTIME
Refills: 0 | Status: DISCONTINUED | OUTPATIENT
Start: 2023-06-29 | End: 2023-06-30

## 2023-06-29 RX ADMIN — Medication 2: at 17:20

## 2023-06-29 RX ADMIN — PIPERACILLIN AND TAZOBACTAM 25 GRAM(S): 4; .5 INJECTION, POWDER, LYOPHILIZED, FOR SOLUTION INTRAVENOUS at 02:00

## 2023-06-29 RX ADMIN — Medication 4: at 12:38

## 2023-06-29 RX ADMIN — Medication 15 UNIT(S): at 17:21

## 2023-06-29 RX ADMIN — PIPERACILLIN AND TAZOBACTAM 25 GRAM(S): 4; .5 INJECTION, POWDER, LYOPHILIZED, FOR SOLUTION INTRAVENOUS at 17:20

## 2023-06-29 RX ADMIN — Medication 15 UNIT(S): at 08:50

## 2023-06-29 RX ADMIN — REMDESIVIR 200 MILLIGRAM(S): 5 INJECTION INTRAVENOUS at 16:01

## 2023-06-29 RX ADMIN — ENOXAPARIN SODIUM 40 MILLIGRAM(S): 100 INJECTION SUBCUTANEOUS at 17:20

## 2023-06-29 RX ADMIN — Medication 15 UNIT(S): at 12:39

## 2023-06-29 RX ADMIN — PIPERACILLIN AND TAZOBACTAM 25 GRAM(S): 4; .5 INJECTION, POWDER, LYOPHILIZED, FOR SOLUTION INTRAVENOUS at 09:46

## 2023-06-29 RX ADMIN — Medication 2: at 08:49

## 2023-06-29 RX ADMIN — Medication 50 MILLIGRAM(S): at 06:02

## 2023-06-29 RX ADMIN — Medication 1 MILLIGRAM(S): at 12:39

## 2023-06-29 RX ADMIN — INSULIN GLARGINE 15 UNIT(S): 100 INJECTION, SOLUTION SUBCUTANEOUS at 22:42

## 2023-06-29 NOTE — PROGRESS NOTE ADULT - PROBLEM SELECTOR PROBLEM 6
2019 novel coronavirus disease (COVID-19)
Anemia
Anemia
2019 novel coronavirus disease (COVID-19)
Anemia
2019 novel coronavirus disease (COVID-19)

## 2023-06-29 NOTE — PROGRESS NOTE ADULT - PROBLEM/PLAN-3
DISPLAY PLAN FREE TEXT
Normal for race
DISPLAY PLAN FREE TEXT

## 2023-06-29 NOTE — PROGRESS NOTE ADULT - PROBLEM SELECTOR PROBLEM 1
Type 2 diabetes mellitus with hyperglycemia
Acute on chronic respiratory failure with hypoxia
Type 2 diabetes mellitus with hyperglycemia
Acute on chronic respiratory failure with hypoxia

## 2023-06-29 NOTE — PROGRESS NOTE ADULT - PROBLEM SELECTOR PROBLEM 4
DM (diabetes mellitus)
DM (diabetes mellitus)
HLD (hyperlipidemia)
SLE (systemic lupus erythematosus)
HLD (hyperlipidemia)
SLE (systemic lupus erythematosus)
HLD (hyperlipidemia)
SLE (systemic lupus erythematosus)
DM (diabetes mellitus)

## 2023-06-29 NOTE — PROGRESS NOTE ADULT - PROBLEM SELECTOR PLAN 6
steroids  remdes  dvt ppx  s/p convalescent plasma
steroids  remdes  dvt ppx  s/p convalescent plasma
- likely anemia of chronic disease   - iron studies noted   - monitor CBC
steroids  remdes  dvt ppx  pulm rec convalescent plasma given B cell deficiency (ordered from blood bank)
- likely anemia of chronic disease   - iron studies noted   - monitor CBC
- likely anemia of chronic disease   - iron studies noted   - monitor CBC

## 2023-06-29 NOTE — PROGRESS NOTE ADULT - PROBLEM SELECTOR PLAN 7
- s/w Lovenox for DVT ppx
- s/w Lovenox for DVT ppx
suspected, apprec immunology eval  pt with immunodeficiencies  ID to see for poss ppx for opportunistic infections, apprec consult, no need for ppx at this time  outpt immunology f/u
- s/w Lovenox for DVT ppx
suspected, apprec immunology eval  pt with immunodeficiencies  ID to see for poss ppx for opportunistic infections  outpt immunology f/u

## 2023-06-29 NOTE — PROGRESS NOTE ADULT - SUBJECTIVE AND OBJECTIVE BOX
Chief Complaint:     History:    MEDICATIONS  (STANDING):  dextrose 5%. 1000 milliLiter(s) (50 mL/Hr) IV Continuous <Continuous>  dextrose 50% Injectable 25 Gram(s) IV Push once  enoxaparin Injectable 40 milliGRAM(s) SubCutaneous every 24 hours  folic acid 1 milliGRAM(s) Oral daily  glucagon  Injectable 1 milliGRAM(s) IntraMuscular once  insulin glargine Injectable (LANTUS) 13 Unit(s) SubCutaneous at bedtime  insulin lispro (ADMELOG) corrective regimen sliding scale   SubCutaneous at bedtime  insulin lispro (ADMELOG) corrective regimen sliding scale   SubCutaneous three times a day before meals  insulin lispro Injectable (ADMELOG) 15 Unit(s) SubCutaneous three times a day before meals  methylPREDNISolone sodium succinate Injectable 50 milliGRAM(s) IV Push daily  piperacillin/tazobactam IVPB.. 3.375 Gram(s) IV Intermittent every 8 hours  remdesivir  IVPB   IV Intermittent   remdesivir  IVPB 100 milliGRAM(s) IV Intermittent every 24 hours    MEDICATIONS  (PRN):  acetaminophen     Tablet .. 650 milliGRAM(s) Oral every 6 hours PRN Temp greater or equal to 38C (100.4F), Mild Pain (1 - 3)  aluminum hydroxide/magnesium hydroxide/simethicone Suspension 30 milliLiter(s) Oral every 4 hours PRN Dyspepsia  dextrose Oral Gel 15 Gram(s) Oral once PRN Blood Glucose LESS THAN 70 milliGRAM(s)/deciliter  guaiFENesin  milliGRAM(s) Oral every 12 hours PRN Cough  melatonin 3 milliGRAM(s) Oral at bedtime PRN Insomnia  ondansetron Injectable 4 milliGRAM(s) IV Push every 8 hours PRN Nausea and/or Vomiting      Allergies    amoxicillin (Rash (Mild to Mod))    Intolerances      Review of Systems:  Cardiovascular: No chest pain  Respiratory: No SOB  GI: No nausea, vomiting  Endocrine: no hypoglycemia     PHYSICAL EXAM:  VITALS: T(C): 36.4 (06-29-23 @ 13:28)  T(F): 97.5 (06-29-23 @ 13:28), Max: 98.2 (06-28-23 @ 20:33)  HR: 89 (06-29-23 @ 13:28) (74 - 89)  BP: 117/68 (06-29-23 @ 13:28) (114/66 - 123/73)  RR:  (18 - 19)  SpO2:  (98% - 98%)  Wt(kg): --  GENERAL: NAD  EYES: No proptosis, anicteric  HEENT:  Atraumatic, Normocephalic  RESPIRATORY: unlabored respirations     CAPILLARY BLOOD GLUCOSE      POCT Blood Glucose.: 223 mg/dL (29 Jun 2023 12:19)  POCT Blood Glucose.: 183 mg/dL (29 Jun 2023 08:39)  POCT Blood Glucose.: 145 mg/dL (28 Jun 2023 22:10)  POCT Blood Glucose.: 220 mg/dL (28 Jun 2023 17:39)      06-28    142  |  102  |  16  ----------------------------<  75  3.2<L>   |  27  |  0.58    eGFR: 105    Ca    9.2      06-28  Mg     2.20     06-28  Phos  4.2     06-28    TPro  5.1<L>  /  Alb  2.9<L>  /  TBili  0.5  /  DBili  x   /  AST  16  /  ALT  15  /  AlkPhos  89  06-27      Thyroid Function Tests:  06-22 @ 19:35 TSH 1.03 FreeT4 -- T3 -- Anti TPO -- Anti Thyroglobulin Ab -- TSI --          Anion Gap: 13 mmol/L (06-28-23 @ 03:47)  Anion Gap: 11 mmol/L (06-27-23 @ 06:28)          A1C with Estimated Average Glucose Result: 11.1 % (06-24-23 @ 06:27)      Diet, Regular:   Consistent Carbohydrate Evening Snack (CSTCHOSN)  DASH/TLC Sodium & Cholesterol Restricted (DASH) (06-24-23 @ 15:48)      Diet, Regular:   Consistent Carbohydrate Evening Snack (CSTCHOSN)  DASH/TLC Sodium & Cholesterol Restricted (DASH) (06-24-23 @ 15:48) [Active]       Chief Complaint: DM 2 with hyperglycemia exacerbated by steroids     History: Patient seen at bedside. Reports she is eating meals; has been snacking a little bit. Solumedrol reduced from 60 mg to 50 mg daily  Denies n/v, denies s/s of hypoglycemia     MEDICATIONS  (STANDING):  dextrose 5%. 1000 milliLiter(s) (50 mL/Hr) IV Continuous <Continuous>  dextrose 50% Injectable 25 Gram(s) IV Push once  enoxaparin Injectable 40 milliGRAM(s) SubCutaneous every 24 hours  folic acid 1 milliGRAM(s) Oral daily  glucagon  Injectable 1 milliGRAM(s) IntraMuscular once  insulin glargine Injectable (LANTUS) 13 Unit(s) SubCutaneous at bedtime  insulin lispro (ADMELOG) corrective regimen sliding scale   SubCutaneous at bedtime  insulin lispro (ADMELOG) corrective regimen sliding scale   SubCutaneous three times a day before meals  insulin lispro Injectable (ADMELOG) 15 Unit(s) SubCutaneous three times a day before meals  methylPREDNISolone sodium succinate Injectable 50 milliGRAM(s) IV Push daily  piperacillin/tazobactam IVPB.. 3.375 Gram(s) IV Intermittent every 8 hours  remdesivir  IVPB   IV Intermittent   remdesivir  IVPB 100 milliGRAM(s) IV Intermittent every 24 hours    MEDICATIONS  (PRN):  acetaminophen     Tablet .. 650 milliGRAM(s) Oral every 6 hours PRN Temp greater or equal to 38C (100.4F), Mild Pain (1 - 3)  aluminum hydroxide/magnesium hydroxide/simethicone Suspension 30 milliLiter(s) Oral every 4 hours PRN Dyspepsia  dextrose Oral Gel 15 Gram(s) Oral once PRN Blood Glucose LESS THAN 70 milliGRAM(s)/deciliter  guaiFENesin  milliGRAM(s) Oral every 12 hours PRN Cough  melatonin 3 milliGRAM(s) Oral at bedtime PRN Insomnia  ondansetron Injectable 4 milliGRAM(s) IV Push every 8 hours PRN Nausea and/or Vomiting    Allergies  amoxicillin (Rash (Mild to Mod))    Review of Systems:  Cardiovascular: No chest pain  Respiratory: No SOB  GI: No nausea, vomiting  Endocrine: no hypoglycemia     PHYSICAL EXAM:  VITALS: T(C): 36.4 (06-29-23 @ 13:28)  T(F): 97.5 (06-29-23 @ 13:28), Max: 98.2 (06-28-23 @ 20:33)  HR: 89 (06-29-23 @ 13:28) (74 - 89)  BP: 117/68 (06-29-23 @ 13:28) (114/66 - 123/73)  RR:  (18 - 19)  SpO2:  (98% - 98%)  Wt(kg): --  GENERAL: NAD  EYES: No proptosis, anicteric  HEENT:  Atraumatic, Normocephalic  RESPIRATORY: unlabored respirations     CAPILLARY BLOOD GLUCOSE    POCT Blood Glucose.: 223 mg/dL (29 Jun 2023 12:19)  POCT Blood Glucose.: 183 mg/dL (29 Jun 2023 08:39)  POCT Blood Glucose.: 145 mg/dL (28 Jun 2023 22:10)  POCT Blood Glucose.: 220 mg/dL (28 Jun 2023 17:39)      06-28    142  |  102  |  16  ----------------------------<  75  3.2<L>   |  27  |  0.58    eGFR: 105    Ca    9.2      06-28  Mg     2.20     06-28  Phos  4.2     06-28    TPro  5.1<L>  /  Alb  2.9<L>  /  TBili  0.5  /  DBili  x   /  AST  16  /  ALT  15  /  AlkPhos  89  06-27      Thyroid Function Tests:  06-22 @ 19:35 TSH 1.03 FreeT4 -- T3 -- Anti TPO -- Anti Thyroglobulin Ab -- TSI --      Anion Gap: 13 mmol/L (06-28-23 @ 03:47)  Anion Gap: 11 mmol/L (06-27-23 @ 06:28)      A1C with Estimated Average Glucose Result: 11.1 % (06-24-23 @ 06:27)      Diet, Regular:   Consistent Carbohydrate Evening Snack (CSTCHOSN)  DASH/TLC Sodium & Cholesterol Restricted (DASH) (06-24-23 @ 15:48)

## 2023-06-29 NOTE — PROGRESS NOTE ADULT - PROBLEM SELECTOR PROBLEM 2
Steroid-induced hyperglycemia
Sepsis
Steroid-induced hyperglycemia
Sepsis

## 2023-06-29 NOTE — PROGRESS NOTE ADULT - SUBJECTIVE AND OBJECTIVE BOX
Interval Events:  - on 1.5L this AM  - last day zosyn today     REVIEW OF SYSTEMS:  Negative except as documented above.      OBJECTIVE:  ICU Vital Signs Last 24 Hrs  T(C): 36.4 (29 Jun 2023 13:28), Max: 36.8 (28 Jun 2023 20:33)  T(F): 97.5 (29 Jun 2023 13:28), Max: 98.2 (28 Jun 2023 20:33)  HR: 89 (29 Jun 2023 13:28) (74 - 89)  BP: 117/68 (29 Jun 2023 13:28) (114/66 - 123/73)  BP(mean): --  ABP: --  ABP(mean): --  RR: 18 (29 Jun 2023 13:28) (18 - 19)  SpO2: 98% (29 Jun 2023 13:28) (98% - 98%)    O2 Parameters below as of 29 Jun 2023 13:28  Patient On (Oxygen Delivery Method): nasal cannula              CAPILLARY BLOOD GLUCOSE      POCT Blood Glucose.: 166 mg/dL (29 Jun 2023 17:02)      PHYSICAL EXAM:  General: NAD  HEENT:  EOMI, sclera anicteric, moist mucus membranes  Neck: supple  Cardiovascular: RRR  Respiratory: CTAB, no wheezes, crackles, or rhonci  Abdomen: soft, nontender  Extremities: warm and well perfused, no edema, no clubbing  Skin: no rashes  Neurological: no focal deficits    HOSPITAL MEDICATIONS:  MEDICATIONS  (STANDING):  dextrose 5%. 1000 milliLiter(s) (50 mL/Hr) IV Continuous <Continuous>  dextrose 50% Injectable 25 Gram(s) IV Push once  enoxaparin Injectable 40 milliGRAM(s) SubCutaneous every 24 hours  folic acid 1 milliGRAM(s) Oral daily  glucagon  Injectable 1 milliGRAM(s) IntraMuscular once  insulin glargine Injectable (LANTUS) 15 Unit(s) SubCutaneous at bedtime  insulin lispro (ADMELOG) corrective regimen sliding scale   SubCutaneous three times a day before meals  insulin lispro (ADMELOG) corrective regimen sliding scale   SubCutaneous at bedtime  piperacillin/tazobactam IVPB.. 3.375 Gram(s) IV Intermittent every 8 hours  remdesivir  IVPB   IV Intermittent   remdesivir  IVPB 100 milliGRAM(s) IV Intermittent every 24 hours    MEDICATIONS  (PRN):  acetaminophen     Tablet .. 650 milliGRAM(s) Oral every 6 hours PRN Temp greater or equal to 38C (100.4F), Mild Pain (1 - 3)  aluminum hydroxide/magnesium hydroxide/simethicone Suspension 30 milliLiter(s) Oral every 4 hours PRN Dyspepsia  dextrose Oral Gel 15 Gram(s) Oral once PRN Blood Glucose LESS THAN 70 milliGRAM(s)/deciliter  guaiFENesin  milliGRAM(s) Oral every 12 hours PRN Cough  melatonin 3 milliGRAM(s) Oral at bedtime PRN Insomnia  ondansetron Injectable 4 milliGRAM(s) IV Push every 8 hours PRN Nausea and/or Vomiting      LABS:                        7.4    7.85  )-----------( 538      ( 28 Jun 2023 03:47 )             24.6     Hgb Trend: 7.4<--, 7.4<--, 7.3<--, 7.4<--, 7.6<--  06-28    142  |  102  |  16  ----------------------------<  75  3.2<L>   |  27  |  0.58    Ca    9.2      28 Jun 2023 03:47  Phos  4.2     06-28  Mg     2.20     06-28      Creatinine Trend: 0.58<--, 0.50<--, 0.58<--, 0.54<--, 0.36<--, 0.41<--    Urinalysis Basic - ( 28 Jun 2023 03:47 )    Color: x / Appearance: x / SG: x / pH: x  Gluc: 75 mg/dL / Ketone: x  / Bili: x / Urobili: x   Blood: x / Protein: x / Nitrite: x   Leuk Esterase: x / RBC: x / WBC x   Sq Epi: x / Non Sq Epi: x / Bacteria: x            MICROBIOLOGY:       RADIOLOGY:  [x] Reviewed and interpreted by me

## 2023-06-29 NOTE — PROGRESS NOTE ADULT - SUBJECTIVE AND OBJECTIVE BOX
[facility] Division of Hospital Medicine  Gabbie Olmedo MD  Pager [phone]    Patient is a 58y old  Female who presents with a chief complaint of Shortness of Breath       SUBJECTIVE / OVERNIGHT EVENTS: doing better overall; d/w pt about poss dc tomorrow after d/w pulm; pt/sister agreeable      MEDICATIONS  (STANDING):  dextrose 5%. 1000 milliLiter(s) (50 mL/Hr) IV Continuous <Continuous>  dextrose 50% Injectable 25 Gram(s) IV Push once  enoxaparin Injectable 40 milliGRAM(s) SubCutaneous every 24 hours  folic acid 1 milliGRAM(s) Oral daily  glucagon  Injectable 1 milliGRAM(s) IntraMuscular once  insulin glargine Injectable (LANTUS) 13 Unit(s) SubCutaneous at bedtime  insulin lispro (ADMELOG) corrective regimen sliding scale   SubCutaneous at bedtime  insulin lispro (ADMELOG) corrective regimen sliding scale   SubCutaneous three times a day before meals  insulin lispro Injectable (ADMELOG) 15 Unit(s) SubCutaneous three times a day before meals  methylPREDNISolone sodium succinate Injectable 50 milliGRAM(s) IV Push daily  piperacillin/tazobactam IVPB.. 3.375 Gram(s) IV Intermittent every 8 hours  remdesivir  IVPB   IV Intermittent   remdesivir  IVPB 100 milliGRAM(s) IV Intermittent every 24 hours    MEDICATIONS  (PRN):  acetaminophen     Tablet .. 650 milliGRAM(s) Oral every 6 hours PRN Temp greater or equal to 38C (100.4F), Mild Pain (1 - 3)  aluminum hydroxide/magnesium hydroxide/simethicone Suspension 30 milliLiter(s) Oral every 4 hours PRN Dyspepsia  dextrose Oral Gel 15 Gram(s) Oral once PRN Blood Glucose LESS THAN 70 milliGRAM(s)/deciliter  guaiFENesin  milliGRAM(s) Oral every 12 hours PRN Cough  melatonin 3 milliGRAM(s) Oral at bedtime PRN Insomnia  ondansetron Injectable 4 milliGRAM(s) IV Push every 8 hours PRN Nausea and/or Vomiting      CAPILLARY BLOOD GLUCOSE  POCT Blood Glucose.: 223 mg/dL (29 Jun 2023 12:19)  POCT Blood Glucose.: 183 mg/dL (29 Jun 2023 08:39)  POCT Blood Glucose.: 145 mg/dL (28 Jun 2023 22:10)  POCT Blood Glucose.: 220 mg/dL (28 Jun 2023 17:39)    I&O's Summary      PHYSICAL EXAM:  Vital Signs Last 24 Hrs  T(F): 97.4 (29 Jun 2023 05:52), Max: 98.2 (28 Jun 2023 20:33)  HR: 74 (29 Jun 2023 05:52) (74 - 87)  BP: 123/73 (29 Jun 2023 05:52) (114/66 - 123/73)  RR: 18 (29 Jun 2023 05:52) (18 - 19)  SpO2: 98% (29 Jun 2023 05:52) (98% - 98%)    Parameters below as of 29 Jun 2023 08:12  Patient On (Oxygen Delivery Method): nasal cannula        CONSTITUTIONAL: NAD, appears comfortable  EYES: PERRLA; conjunctiva and sclera clear  ENMT: Moist oral mucosa; normal dentition  RESPIRATORY: Normal respiratory effort; grossly b/l AE  CARDIOVASCULAR: Regular rate and rhythm; No lower extremity edema;   ABDOMEN: Nontender to palpation, normoactive bowel sounds  MUSCULOSKELETAL:  no clubbing or cyanosis of digits; no joint swelling or tenderness to palpation  PSYCH: A+O to person, place, and time; affect appropriate  NEUROLOGY: CN 2-12 are intact and symmetric; no gross sensory deficits   SKIN: No rashes; no palpable lesions    LABS:                        7.4    7.85  )-----------( 538      ( 28 Jun 2023 03:47 )             24.6     06-28    142  |  102  |  16  ----------------------------<  75  3.2<L>   |  27  |  0.58    Ca    9.2      28 Jun 2023 03:47  Phos  4.2     06-28  Mg     2.20     06-28            Urinalysis Basic - ( 28 Jun 2023 03:47 )    Color: x / Appearance: x / SG: x / pH: x  Gluc: 75 mg/dL / Ketone: x  / Bili: x / Urobili: x   Blood: x / Protein: x / Nitrite: x   Leuk Esterase: x / RBC: x / WBC x   Sq Epi: x / Non Sq Epi: x / Bacteria: x

## 2023-06-29 NOTE — DISCHARGE NOTE PROVIDER - PROVIDER TOKENS
FREE:[LAST:[Dr. Washington],PHONE:[(   )    -],FAX:[(   )    -],ADDRESS:[Virtual Pulmonary Appointment],SCHEDULEDAPPT:[07/03/2023],SCHEDULEDAPPTTIME:[01:45 PM]],PROVIDER:[TOKEN:[[id number]],SCHEDULEDAPPT:[07/12/2023],SCHEDULEDAPPTTIME:[11:00 AM]] PROVIDER:[TOKEN:[[id number]],SCHEDULEDAPPT:[07/12/2023],SCHEDULEDAPPTTIME:[11:00 AM]],FREE:[LAST:[Dr. Washington],PHONE:[(   )    -],FAX:[(   )    -],ADDRESS:[Virtual Pulmonary Appointment],SCHEDULEDAPPT:[07/03/2023],SCHEDULEDAPPTTIME:[01:45 PM]],PROVIDER:[TOKEN:[[id number]],FOLLOWUP:[1 week]]

## 2023-06-29 NOTE — PROGRESS NOTE ADULT - PROBLEM SELECTOR PLAN 3
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 She was discharged home with scheduled follow-up with CT surgery and oncology.  - will need records of prior workup from [facility]   - Neuro consult to r/o myasthenia gravis  - check NIF daily
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 She was discharged home with scheduled follow-up with CT surgery and oncology.  - will need records of prior workup from [facility]   - Neuro consulted to r/o myasthenia gravis  - F/u Anti AchR AB  - send Anti MUSK Ab, Anti striated muscle antibodies  - Monitor NIF and VC
- history of lupus, diagnosed >10 years ago  - suppose to be on Plaquenil, but since onset of COVID pandemic for 3-4 years, has not followed with a rheumatologist - currently on no lupus medications  - started on Solumedrol 60mg daily for rapidly progressing interstitial lung disease   - f/u MDA-5 ab, anti Glory, myomarker panel, ANTOINE, Sjogren, immunoglobulin levels, myasthenia serologies  - Ferritin 1791, C4 52, C3 179, dsDNA <12, AI neg   - rheum f/up  will need slow steroids taper with bactrim ppx
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 She was discharged home with scheduled follow-up with CT surgery and oncology.  - will need records of prior workup from [facility]   - Neuro consulted to r/o myasthenia gravis  - F/u Anti AchR AB  - send Anti MUSK Ab, Anti striated muscle antibodies  - Monitor NIF and VC
- history of lupus, diagnosed >10 years ago  - suppose to be on Plaquenil, but since onset of COVID pandemic for 3-4 years, has not followed with a rheumatologist - currently on no lupus medications  - started on Solumedrol 60mg daily for rapidly progressing interstitial lung disease   - f/u MDA-5 ab, anti Glory, myomarker panel, ANTOINE, Sjogren, immunoglobulin levels, myasthenia serologies  - Ferritin 1791, C4 52, C3 179, dsDNA <12, AI neg   - rheum f/up
- history of lupus, diagnosed >10 years ago  - suppose to be on Plaquenil, but since onset of COVID pandemic for 3-4 years, has not followed with a rheumatologist - currently on no lupus medications  - started on Solumedrol 60mg daily for rapidly progressing interstitial lung disease   - f/u MDA-5 ab, anti Glory, myomarker panel, ANTOINE, Sjogren, immunoglobulin levels, myasthenia serologies  - Ferritin 1791, C4 52, C3 179, dsDNA <12, AI neg   - rheum f/up
MEDICATIONS  (STANDING):  ARIPiprazole 30 milliGRAM(s) Oral at bedtime  gabapentin 400 milliGRAM(s) Oral <User Schedule>  lithium CR (ESKALITH-CR) 900 milliGRAM(s) Oral at bedtime  nicotine - 21 mG/24Hr(s) Patch 1 Patch Transdermal daily    MEDICATIONS  (PRN):  haloperidol     Tablet 5 milliGRAM(s) Oral every 6 hours PRN agitation  haloperidol    Injectable 5 milliGRAM(s) IntraMuscular Once PRN agitation  nicotine  Polacrilex Gum 4 milliGRAM(s) Oral every 3 hours PRN nicotine cravings  QUEtiapine 50 milliGRAM(s) Oral at bedtime PRN mood  sodium chloride 0.65% Nasal 1 Spray(s) Both Nostrils three times a day PRN Nasal Congestion  
MEDICATIONS  (STANDING):  ARIPiprazole 30 milliGRAM(s) Oral at bedtime  ARIPiprazole lauroxil Injectable, Long Acting (ARISTADA INITIO) 675 milliGRAM(s) IntraMuscular once  gabapentin 400 milliGRAM(s) Oral <User Schedule>  lithium CR (ESKALITH-CR) 900 milliGRAM(s) Oral at bedtime  nicotine - 21 mG/24Hr(s) Patch 1 Patch Transdermal daily    MEDICATIONS  (PRN):  haloperidol     Tablet 5 milliGRAM(s) Oral every 6 hours PRN agitation  haloperidol    Injectable 5 milliGRAM(s) IntraMuscular Once PRN agitation  nicotine  Polacrilex Gum 4 milliGRAM(s) Oral every 3 hours PRN nicotine cravings  QUEtiapine 50 milliGRAM(s) Oral at bedtime PRN mood  sodium chloride 0.65% Nasal 1 Spray(s) Both Nostrils three times a day PRN Nasal Congestion

## 2023-06-29 NOTE — DISCHARGE NOTE PROVIDER - HOSPITAL COURSE
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

## 2023-06-29 NOTE — PROGRESS NOTE ADULT - ATTENDING COMMENTS
58 year old female with recent COVID with sequela of O2 dependency since end of May, thymoma, with pulmonary infiltrates.  Clinically improved after initiation of antibiotics and steroids.   Serologies pending.  Will follow
Clinically improving.  Saturations 91 - 93% on room air but continues to desaturate with exertion.  Continue remdesivir. Taper solumedrol to 50 mg. Complete 7 day course of Zosyn.   Outpatient immunology follow up.
Continued clinical improvement.   Down to NC 1.5 - 2 LPM.  Last day of Zosyn.  Complete 5 day course of remdesivir.  Can switch to PO prednisone 50 mg in AM and weekly taper by 10 mg.  Bactrim for PCP for prophylaxis.  Will arrange for outpatient pulmonary follow up.   Outpatient immunology follow up.
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 Appreciate immunology evaluation.  Complete course of Remdesivir. Recommend convalescent plasma.  Follow up COVID antibodies.
I personally interviewed and examined the patient. Agree with rheumatology fellow's note above.   Patient with respiratory failure, requiring oxygen with extensive bilateral  GGO and reticular interstitial opacities COVID 19- positive- ? if acute poor clearing infection / vs post viral inflammatory process, while PCR is positive due to poor clearing ? dead viral shedding ? vs active viral infection  R/o possible MDA-5 disease . Improved with  solumedrol 60 (6/23-TBD).  Discussed with pulmonary - possibility of reconvalescent plasma / vs susy inhibitor   FU IgG levels
Patient seen and examined at bedside. Agree with assessment and plan as above
58 year old female with recent COVID with sequela of O2 dependency since end of May, thymoma, with pulmonary infiltrates.  Clinically improved after initiation of antibiotics and steroids.   However, COVID PCR now positive.  Pulmonary findings may be due to persistent COVID in setting of immunodeficiency.  Check COVID spike and nucleocapsid ab.  Start Remdesivir.

## 2023-06-29 NOTE — DISCHARGE NOTE PROVIDER - NSDCFUSCHEDAPPT_GEN_ALL_CORE_FT
[facility]  [address]  Scheduled Appointment: 07/06/2023     Olga Washington  [facility]  PULED [address]  Scheduled Appointment: 07/03/2023    Sebastian Reddy  [facility] Physician [facility]  [address]  Scheduled Appointment: 07/06/2023    Shahida Brown  [facility] [address]  Scheduled Appointment: 07/12/2023

## 2023-06-29 NOTE — PROGRESS NOTE ADULT - ASSESSMENT
59 yo female adm for SLE on high-dose steroids with marked hyperglycemia. Pt was recently diagnosed with Type 2 DM on adm to [facility] for pulm issues, discharged on low-dose basal bolus. Admitted with SOB and started on Solumedrol, endocrine consulted for uncontrolled DM 2 (A1c 11.1%) with steroid induced hyperglycemia, now COVID+    1. DM 2 with hyperglycemia   A1c 11.1%  Home Regimen: Lantus 15 units SQ qHS and Admelog 5 units TID before meals; recently adjusted from Lantus 12 and Admelog 3/3/3  Glucose has improved with insulin adjustments and AG now WDL    While inpatient:  BG target 100-180 mg/dl  Solumedrol reduced slightly (60-->50), however, patient BG slightly higher today  Adjust Lantus to 15 units SQ qHS  Continue Admelog to 15 units SQ TID before meals (Hold if NPO/skips meal)   Continue Admelog MODERATE dose correctional scale before meals  Continue Admelog moderate dose scale at bedtime  Consistent carbohydrate diet, RD consult appreciated   Check BG before meals and bedtime  Hypoglycemia protocol   Please adjust IV ABX in dextrose to normal saline formulations if possible    Discharge Plan:  Resume basal/bolus insulin PENS, dose TBD  Reviewed with patient the relationship between steroids and blood glucose/insulin dosing, if on steroids or steroid taper as outpatient, her insulin doses will need to be adjusted accordingly  Ensure patient has glucometer, glucose test strips, lancets, alcohol swabs and insulin PEN needles   Followup with PCP, endocrinology, podiatry and opthalmology as outpatient  Followup with prior endocrinologist Dr. Misty Dominguez, [facility]    2. Steroid induced hyperglycemia  Currently on Solumedrol 50 mg daily as of 6/29, reduced from Solumedrol 60  Please keep endocrine informed regarding steroid plans or taper  Patient with hx of Lupus - Patient denies chronic steroids; although was on Prednisone for about 1 month when first diagnosed. Would not withdraw steroid treatment abruptly, risk for AI  Patient advised of risk of steroid-induced osteoporosis, recommend bone density as outpatient    3. HTN  BP target less than 130/80  BP within target, not on antihypertensives  Continue to monitor  Microalbumin/creat ratio as outpatient    4. HLD  LDL target less than 70  LDL resulted 117  Recommend statin if no contraindication    Ju Julien  Nurse Practitioner  Division of Endocrinology & Diabetes  In house pager #[phone]/long range pager #[phone]    If before 9AM or after 6PM, or on weekends/holidays, please call endocrine answering service for assistance ([phone]).  For nonurgent matters email [email or web address] for assistance.  57 yo female adm for SLE on high-dose steroids with marked hyperglycemia. Pt was recently diagnosed with Type 2 DM on adm to [facility] for pulm issues, discharged on low-dose basal bolus. Admitted with SOB and started on Solumedrol, endocrine consulted for uncontrolled DM 2 (A1c 11.1%) with steroid induced hyperglycemia, now COVID+    1. DM 2 with hyperglycemia   A1c 11.1%  Home Regimen: Lantus 15 units SQ qHS and Admelog 5 units TID before meals; recently adjusted from Lantus 12 and Admelog 3/3/3  Glucose has improved with insulin adjustments and AG now WDL    While inpatient:  BG target 100-180 mg/dl  Solumedrol reduced slightly (60-->50), however, patient BG slightly higher today  Adjust Lantus to 15 units SQ qHS  Continue Admelog to 15 units SQ TID before meals (Hold if NPO/skips meal)   Continue Admelog MODERATE dose correctional scale before meals  Continue Admelog moderate dose scale at bedtime  Consistent carbohydrate diet, RD consult appreciated   Check BG before meals and bedtime  Hypoglycemia protocol   Please adjust IV ABX in dextrose to normal saline formulations if possible    Discharge Plan:  Resume basal/bolus insulin PENS, dose TBD  Reviewed with patient the relationship between steroids and blood glucose/insulin dosing, if on steroids or steroid taper as outpatient, her insulin doses will need to be adjusted accordingly  Ensure patient has glucometer, glucose test strips, lancets, alcohol swabs and insulin PEN needles   Followup with PCP, endocrinology, podiatry and opthalmology as outpatient  Followup with prior endocrinologist Dr. Misty Dominguez, [facility]    2. Steroid induced hyperglycemia  Currently on Solumedrol 50 mg daily as of 6/29, reduced from Solumedrol 60  Please keep endocrine informed regarding steroid plans or taper  Patient with hx of Lupus - Patient denies chronic steroids; although was on Prednisone for about 1 month when first diagnosed. Would not withdraw steroid treatment abruptly, risk for AI  Patient advised of risk of steroid-induced osteoporosis, recommend bone density as outpatient    3. HTN  BP target less than 130/80  BP within target, not on antihypertensives  Continue to monitor  Microalbumin/creat ratio as outpatient    4. HLD  LDL target less than 70  LDL resulted 117  Recommend statin if no contraindication    Ju Julien  Nurse Practitioner  Division of Endocrinology & Diabetes  In house pager #[phone]/long range pager #[phone]    If before 9AM or after 6PM, or on weekends/holidays, please call endocrine answering service for assistance ([phone]).  For nonurgent matters email [email or web address] for assistance.     ADDENDUM:  Notified by primary team that patient is being switched to Prednisone 50 mg daily tomorrow (from Solumedrol 50) daily  Prednisone 62.5 mg would equal Solumedrol 50, therefore the dose is being reduced by about 12 mg  Recommend to reduce Admelog from 15 units TID before meals to 12 units TID before meals, starting 6/30 AM  Endocrine will continue to follow

## 2023-06-29 NOTE — PROGRESS NOTE ADULT - ASSESSMENT
57 yo F with PMH SLE (dx ?12 years ago, not on tx for past 5 years, initially w/ joint pain and pleural effusion), new dx thymoma, recent admission to [facility] x 2 for shortness of breath (initially with covid, then represented with continued shortness of breath) who presents with shortness of breath and abnormal CT chest.     # SLE  # b/l ground glass and reticular opacities  # chronic hypoxemic respiratory failure  # thymoma  # covid  Differential is broad and difficult to narrow without prior records. Currently, CT chest shows diffuse bilateral ground glass and reticular opacities. Presumably, these were not present during admission to [facility] as pt had bronchoscopy and mediastinal mass biopsy w/o lung biopsy (this probably would have been done if there were parenchymal abnormalities). However, she was discharged on 3L home O2 which is her current oxygen requirement. She has difficulty speaking in full sentences, though this seems to be due to choking sensation rather than sob.   It seems that she is having difficulty managing secretions and may have ptosis on exam, ?myasthenia.  Rheumatology concerned about MDA-5 disease in setting of rapidly progressive lung disease and fever.  - procal 0.09 -- low suspicion for pneumonia but will cover broadly for now   - BNP 43  - RVP negative initially, now covid +  - CXR 6/22: b/l reticular nodular opacities  - CTA Chest 6/22: no PE, 3.9 x 4.9 cm mediastinal mass corresponding to known thymoma. Bilateral diffuse groundglass and reticular opacities with mild bronchiectasis        Plan:  - f/u [facility] records w/ prior CT/MRI for comparison  - c/w empiric abx (on zosyn), complete 7 day course today  - duonebs q6  - please provide with aerobika (order as airway clearance device) to use w/ nebulized meds   - sputum cx if able  - f/u TTE  - agree w/ steroids per rheum (solumedrol 60 qd 6/23-6/28, solumedrol 6/29), start prednisone 50 mg tomorrow, decrease by 10 mg every 7 days   - f/u MDA-5 ab, anti Glory, myomarker panel, dsDNA, ANTOINE, Sjogren, C3, C4, ferritin, myasthenia serologies  - quantiferon negative   - immunoglobulins low (Igg, iga, and igm) - this is likely due to thymoma; immunology c/s re: goods syndrome, will likely benefit from IVIG outpatient   - continue remdesivir, convalescent plasma given low immunoglobulin levels   - covid spike ab positive, nucleocapsid ab negative            Tatiana Guallpa MD  Pulmonary and Critical Care Fellow

## 2023-06-29 NOTE — DISCHARGE NOTE PROVIDER - CARE PROVIDER_API CALL
Dr. Washington,   Virtual Pulmonary Appointment  Phone: (   )    -  Fax: (   )    -  Scheduled Appointment: 07/03/2023 01:45 PM    Shahida Brown.  Pulmonary Disease  [address] [place] [address]  Phone: [phone]  Fax: [phone]  Scheduled Appointment: 07/12/2023 11:00 AM   Shahida Brown.  Pulmonary Disease  [address], [address]  [place] [address]  Phone: [phone]  Fax: [phone]  Scheduled Appointment: 07/12/2023 11:00 AM    Dr. Washington,   Virtual Pulmonary Appointment  Phone: (   )    -  Fax: (   )    -  Scheduled Appointment: 07/03/2023 01:45 PM    Misty Dominguez  Endocrinology/Metab/Diabetes  [address] [place] [id number]  Phone: [phone]  Fax: [phone]  Follow Up Time: 1 week

## 2023-06-29 NOTE — DISCHARGE NOTE PROVIDER - ATTENDING DISCHARGE PHYSICAL EXAMINATION:
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

## 2023-06-29 NOTE — PROGRESS NOTE ADULT - PROBLEM SELECTOR PROBLEM 5
DM (diabetes mellitus)
Prophylactic measure

## 2023-06-29 NOTE — PROGRESS NOTE ADULT - PROBLEM SELECTOR PROBLEM 3
HTN (hypertension)
SLE (systemic lupus erythematosus)
SLE (systemic lupus erythematosus)
Thymoma
HTN (hypertension)
SLE (systemic lupus erythematosus)
Thymoma
Thymoma

## 2023-06-29 NOTE — DISCHARGE NOTE PROVIDER - NSDCCPCAREPLAN_GEN_ALL_CORE_FT
PRINCIPAL DISCHARGE DIAGNOSIS  Diagnosis: Acute on chronic respiratory failure with hypoxia  Assessment and Plan of Treatment: You were found to have covid on this admission; unclear if this is a new infection or lingering old infection from May; Due to your immunocompromised state, you were treated with steroids, remdesivir and convalescent plasma with improvement in your symptoms. You will continue to use your oxygen at home.  You are now at       SECONDARY DISCHARGE DIAGNOSES  Diagnosis: SLE (systemic lupus erythematosus)  Assessment and Plan of Treatment: Continue your steroids as we have prescribed and follow up with rheumatology in 1 month    Diagnosis: Good syndrome  Assessment and Plan of Treatment: It is suspected that you have good syndrome with low immunoglobumins and B cell deficiency leading you to be a risk for infections.  Wash your hands frequently and avoid contact with sick people; Follow up with immunology within 2 weeks    Diagnosis: Sepsis  Assessment and Plan of Treatment: You had sepsis due to covid    Diagnosis: 2019 novel coronavirus disease (COVID-19)  Assessment and Plan of Treatment: You were treated as noted above     PRINCIPAL DISCHARGE DIAGNOSIS  Diagnosis: Acute on chronic respiratory failure with hypoxia  Assessment and Plan of Treatment: You were found to have covid on this admission; unclear if this is a new infection or lingering old infection from May; Due to your immunocompromised state, you were treated with steroids, remdesivir and convalescent plasma with improvement in your symptoms. You will continue to use your oxygen at home.  You are now at 2L      SECONDARY DISCHARGE DIAGNOSES  Diagnosis: Good syndrome  Assessment and Plan of Treatment: It is suspected that you have good syndrome with low immunoglobumins and B cell deficiency leading you to be a risk for infections.  Wash your hands frequently and avoid contact with sick people; Follow up with immunology within 2 weeks    Diagnosis: SLE (systemic lupus erythematosus)  Assessment and Plan of Treatment: Continue your steroids as we have prescribed and follow up with rheumatology in 1 month    Diagnosis: 2019 novel coronavirus disease (COVID-19)  Assessment and Plan of Treatment: You were treated as noted above    Diagnosis: DM (diabetes mellitus)  Assessment and Plan of Treatment: Follow up w/ endocrinologist Dr. Dominguez within 1 week to discuss your insulin regimen while on the steroid taper. Please call Dr. Dominguez with any low blood sugars.    Diagnosis: Sepsis  Assessment and Plan of Treatment: You had sepsis due to covid

## 2023-06-29 NOTE — PROGRESS NOTE ADULT - PROBLEM SELECTOR PLAN 5
- s/w Lovenox for DVT ppx
- on lantus 15 units qhs and lispro 5 units TID AC at home   - Hgb A1c 11.1, now w/ steroid induced hyperglycemia   - increase Lantus to 25 units qhs and lispro to 10 units TID   - c/w ISS  - monitor fingersticks   - Endo input appreciated
- on lantus 15 units qhs and lispro 5 units TID AC at home   - Hgb A1c 11.1, now w/ steroid induced hyperglycemia   - increase Lantus to 25 units qhs and lispro to 10 units TID   - c/w ISS  - monitor fingersticks   - Endo input appreciated
- s/w Lovenox for DVT ppx
- s/w Lovenox for DVT ppx
- on lantus 15 units qhs and lispro 5 units TID AC at home   - Hgb A1c 11.1  - increase Lantus to 15 units qhs and lispro to 5 units TID   - c/w ISS  - monitor fingersticks   - Endo consulted

## 2023-06-29 NOTE — PROGRESS NOTE ADULT - PROBLEM SELECTOR PLAN 4
- on lantus 15 units qhs and lispro 5 units TID AC at home   - Hgb A1c 11.1, now w/ steroid induced hyperglycemia   - increase Lantus to 25 units qhs and lispro to 10 units TID   - c/w ISS  - monitor fingersticks   - Endo input appreciated
- history of lupus, diagnosed >10 years ago  - suppose to be on Plaquenil, but since onset of COVID pandemic for 3-4 years, has not followed with a rheumatologist - currently on no lupus medications  - started on Solumedrol 60mg daily for rapidly progressing interstitial lung disease   - f/u MDA-5 ab, anti Glory, myomarker panel, ANTOINE, Sjogren, immunoglobulin levels, myasthenia serologies  - Ferritin 1791, C4 52, C3 179, dsDNA <12, AI neg   - rheum f/up
- history of lupus, diagnosed >10 years ago  - suppose to be on Plaquenil, but since onset of COVID pandemic for 3-4 years, has not followed with a rheumatologist - currently on no lupus medications  - started on Solumedrol 60mg daily for rapidly progressing interstitial lung disease   - f/u MDA-5 ab, anti Glory, myomarker panel, ANTOINE, Sjogren, immunoglobulin levels, myasthenia serologies  - Ferritin 1791, C4 52, C3 179, dsDNA <12, AI neg   - rheum f/up
- on lantus 15 units qhs and lispro 5 units TID AC at home   - Hgb A1c 11.1, now w/ steroid induced hyperglycemia   - increase Lantus to 25 units qhs and lispro to 10 units TID   - c/w ISS  - monitor fingersticks   - Endo input appreciated
- history of lupus, diagnosed >10 years ago  - suppose to be on Plaquenil, but since onset of COVID pandemic for 3-4 years, has not followed with a rheumatologist - currently on no lupus medications  - started on Solumedrol 60mg daily for rapidly progressing interstitial lung disease   - f/u MDA-5 ab, anti Glory, myomarker panel, dsDNA, ANTOINE, Sjogren, immunoglobulin levels, myasthenia serologies  - Ferritin 1791, C4 52, C3 179  - rheum f/up
- on lantus 15 units qhs and lispro 5 units TID AC at home   - Hgb A1c 11.1, now w/ steroid induced hyperglycemia   - increase Lantus to 25 units qhs and lispro to 10 units TID   - c/w ISS  - monitor fingersticks   - Endo input appreciated

## 2023-06-29 NOTE — PROGRESS NOTE ADULT - PROBLEM SELECTOR PLAN 2
- sepsis/SIRS (tachypnea, fever) present on admission d/t possible pneumonia   - procal 0.09   - RVP neg   - Blood and urine cultures NGTD   - legionella neg, strep Ag neg   - MRSA PCR neg   - c/w IV Zosyn for possible gram negative PNA, will d/c Vanco
- sepsis/SIRS (tachypnea, fever) present on admission d/t possible pneumonia   - procal 0.09   - RVP neg   - Blood and urine cultures NGTD   - legionella neg, strep Ag neg   - MRSA PCR neg   - c/w IV Zosyn for possible gram negative PNA, stop after 5 days  poss due to covid
- sepsis/SIRS (tachypnea, fever) present on admission d/t possible pneumonia   - procal 0.09   - RVP neg   - Blood and urine cultures NGTD   - legionella neg, strep Ag neg   - MRSA PCR neg   - c/w IV Zosyn for possible gram negative PNA, stop after 5 days  poss due to covid  resolving
- sepsis/SIRS (tachypnea, fever) present on admission d/t possible pneumonia   - procal 0.09   - RVP neg   - Follow up blood, urine, and sputum cultures  - legionella/strep/MRSA PCR testing ordered   - c/w IV Vanco/Zosyn for now

## 2023-06-29 NOTE — DISCHARGE NOTE PROVIDER - NSDCMRMEDTOKEN_GEN_ALL_CORE_FT
Admelog 100 units/mL injectable solution: 5 unit(s) injectable 3 times a day (before meals)  folic acid 1 mg oral tablet: 1 tab(s) orally once a day  Lantus 100 units/mL subcutaneous solution: 15 unit(s) subcutaneous once a day (at bedtime)   folic acid 1 mg oral tablet: 1 tab(s) orally once a day  insulin lispro 100 units/mL injectable solution: 14 unit(s) injectable 3 times a day (before meals) HOLD if not eating. Please call Endocrinologist Dr. Dominguez for further direction regarding insulin during steroid taper, and please call Dr. Dominguez if HYPOGLYCEMIA occurs  Lantus 100 units/mL subcutaneous solution: 15 unit(s) subcutaneous once a day (at bedtime)  predniSONE 10 mg oral tablet: 1 tab(s) orally once a day Prednisone Taper as follows:  50mg (5 tabs) daily from 6/30-7/06  40mg (4 tabs) daily from 7/07-7/13  30mg (3 tabs) daily from 7/14-7/20  20mg (2 tabs) daily from 7/21-7/27  10mg (1 tab) daily from 7/28-8/03   folic acid 1 mg oral tablet: 1 tab(s) orally once a day  insulin lispro 100 units/mL injectable solution: 14 unit(s) injectable 3 times a day (before meals) HOLD if not eating. Please call Endocrinologist Dr. Dominguez for further direction regarding insulin during steroid taper, and please call Dr. Dominguez if HYPOGLYCEMIA occurs  Lantus 100 units/mL subcutaneous solution: 15 unit(s) subcutaneous once a day (at bedtime)  predniSONE 10 mg oral tablet: 1 tab(s) orally once a day Prednisone Taper as follows:  50mg (5 tabs) daily from 6/30-7/06  40mg (4 tabs) daily from 7/07-7/13  30mg (3 tabs) daily from 7/14-7/20  20mg (2 tabs) daily from 7/21-7/27  10mg (1 tab) daily from 7/28-8/03  sulfamethoxazole-trimethoprim 800 mg-160 mg oral tablet: 1 tab(s) orally 3 times a week Monday/Wednesday/Friday

## 2023-06-29 NOTE — PROGRESS NOTE ADULT - PROBLEM SELECTOR PLAN 1
- pt has been admitted twice to [facility] for shortness of breath since May  - CTA scan with no PE, but 3.9 x 4.9 cm mediastinal mass corresponding to known thymoma. Nonspecific groundglass and reticular opacities with mild bronchiectasis in both lungs may be sequela of atypical infection or related to chronic interstitial disease given history of SLE.  - per patient she is on home oxygen at 3L   - c/w Xopenex ATC   - on Solumedrol 60mg daily   - started on Mucomyst, incentive spirometry    - c/w antibiotics as below  - aerobika for airway clearance  - check TTE  now covid +, on steroids for poss rapidly progressive lung dz due to SLE; complete rem tomorrow  improving

## 2023-06-30 ENCOUNTER — TRANSCRIPTION ENCOUNTER (OUTPATIENT)
Age: 58
End: 2023-06-30

## 2023-06-30 VITALS
SYSTOLIC BLOOD PRESSURE: 129 MMHG | OXYGEN SATURATION: 100 % | HEART RATE: 92 BPM | TEMPERATURE: 99 F | RESPIRATION RATE: 18 BRPM | DIASTOLIC BLOOD PRESSURE: 83 MMHG

## 2023-06-30 PROBLEM — Z87.898 PERSONAL HISTORY OF OTHER SPECIFIED CONDITIONS: Chronic | Status: ACTIVE | Noted: 2023-06-23

## 2023-06-30 PROBLEM — M32.9 SYSTEMIC LUPUS ERYTHEMATOSUS, UNSPECIFIED: Chronic | Status: ACTIVE | Noted: 2023-06-23

## 2023-06-30 PROBLEM — E11.9 TYPE 2 DIABETES MELLITUS WITHOUT COMPLICATIONS: Chronic | Status: ACTIVE | Noted: 2023-06-23

## 2023-06-30 LAB
ANION GAP SERPL CALC-SCNC: 12 MMOL/L — SIGNIFICANT CHANGE UP (ref 7–14)
BUN SERPL-MCNC: 15 MG/DL — SIGNIFICANT CHANGE UP (ref 7–23)
CALCIUM SERPL-MCNC: 9.4 MG/DL — SIGNIFICANT CHANGE UP (ref 8.4–10.5)
CHLORIDE SERPL-SCNC: 102 MMOL/L — SIGNIFICANT CHANGE UP (ref 98–107)
CO2 SERPL-SCNC: 26 MMOL/L — SIGNIFICANT CHANGE UP (ref 22–31)
CREAT SERPL-MCNC: 0.57 MG/DL — SIGNIFICANT CHANGE UP (ref 0.5–1.3)
EGFR: 105 ML/MIN/1.73M2 — SIGNIFICANT CHANGE UP
GLUCOSE BLDC GLUCOMTR-MCNC: 170 MG/DL — HIGH (ref 70–99)
GLUCOSE BLDC GLUCOMTR-MCNC: 180 MG/DL — HIGH (ref 70–99)
GLUCOSE BLDC GLUCOMTR-MCNC: 293 MG/DL — HIGH (ref 70–99)
GLUCOSE SERPL-MCNC: 87 MG/DL — SIGNIFICANT CHANGE UP (ref 70–99)
HCT VFR BLD CALC: 28.4 % — LOW (ref 34.5–45)
HGB BLD-MCNC: 8.6 G/DL — LOW (ref 11.5–15.5)
ISLET CELL512 AB SER-ACNC: SIGNIFICANT CHANGE UP
MAGNESIUM SERPL-MCNC: 2.2 MG/DL — SIGNIFICANT CHANGE UP (ref 1.6–2.6)
MCHC RBC-ENTMCNC: 18 PG — LOW (ref 27–34)
MCHC RBC-ENTMCNC: 30.3 GM/DL — LOW (ref 32–36)
MCV RBC AUTO: 59.4 FL — LOW (ref 80–100)
NRBC # BLD: 1 /100 WBCS — HIGH (ref 0–0)
NRBC # FLD: 0.13 K/UL — HIGH (ref 0–0)
PHOSPHATE SERPL-MCNC: 4.6 MG/DL — HIGH (ref 2.5–4.5)
PLATELET # BLD AUTO: 639 K/UL — HIGH (ref 150–400)
POTASSIUM SERPL-MCNC: 3.8 MMOL/L — SIGNIFICANT CHANGE UP (ref 3.5–5.3)
POTASSIUM SERPL-SCNC: 3.8 MMOL/L — SIGNIFICANT CHANGE UP (ref 3.5–5.3)
RBC # BLD: 4.78 M/UL — SIGNIFICANT CHANGE UP (ref 3.8–5.2)
RBC # FLD: 22.7 % — HIGH (ref 10.3–14.5)
SODIUM SERPL-SCNC: 140 MMOL/L — SIGNIFICANT CHANGE UP (ref 135–145)
TETANUS ANTIBODIES IGG: 0.34 IU/ML — SIGNIFICANT CHANGE UP
WBC # BLD: 12.13 K/UL — HIGH (ref 3.8–10.5)
WBC # FLD AUTO: 12.13 K/UL — HIGH (ref 3.8–10.5)

## 2023-06-30 PROCEDURE — 99239 HOSP IP/OBS DSCHRG MGMT >30: CPT

## 2023-06-30 RX ORDER — INSULIN LISPRO 100/ML
5 VIAL (ML) SUBCUTANEOUS
Refills: 0 | DISCHARGE

## 2023-06-30 RX ORDER — INSULIN LISPRO 100/ML
14 VIAL (ML) SUBCUTANEOUS
Qty: 0 | Refills: 0 | DISCHARGE
Start: 2023-06-30

## 2023-06-30 RX ADMIN — Medication 6: at 12:31

## 2023-06-30 RX ADMIN — Medication 12 UNIT(S): at 12:31

## 2023-06-30 RX ADMIN — Medication 50 MILLIGRAM(S): at 05:56

## 2023-06-30 RX ADMIN — ENOXAPARIN SODIUM 40 MILLIGRAM(S): 100 INJECTION SUBCUTANEOUS at 17:20

## 2023-06-30 RX ADMIN — REMDESIVIR 200 MILLIGRAM(S): 5 INJECTION INTRAVENOUS at 14:15

## 2023-06-30 RX ADMIN — Medication 12 UNIT(S): at 08:52

## 2023-06-30 RX ADMIN — Medication 12 UNIT(S): at 17:20

## 2023-06-30 RX ADMIN — Medication 2: at 08:46

## 2023-06-30 RX ADMIN — Medication 2: at 17:19

## 2023-06-30 RX ADMIN — Medication 1 MILLIGRAM(S): at 12:30

## 2023-06-30 NOTE — DISCHARGE NOTE NURSING/CASE MANAGEMENT/SOCIAL WORK - NSDCPEFALRISK_GEN_ALL_CORE
For information on Fall & Injury Prevention, visit: [email or web address] OR  [email or web address] OR  https://www.cdc.gov/steadi/patient.html

## 2023-06-30 NOTE — CHART NOTE - NSCHARTNOTEFT_GEN_A_CORE
Due to patient current hospitalization due to COVID, and PNA pt has become de-conditioned and would benefit from a rolling walker at home.    Chance Bass PA-C  pager [phone]

## 2023-06-30 NOTE — CHART NOTE - NSCHARTNOTESELECT_GEN_ALL_CORE
Event Note
Allergy & Immunology/Event Note
Endocrine
Event Note
thoracic surgery/Event Note

## 2023-06-30 NOTE — CHART NOTE - NSCHARTNOTEFT_GEN_A_CORE
Called for discharge recommendations - see last progress note for complete plan of care   Patient is currently on prednisone 50 mg through 7/6  Prior team reports steroids to be titrated down weekly by prescribing team   Patient uses Lantus (15 units) and Admelog (3 units TID before meals) at home  Insulin dosing for discharge WHILE ON PREDNISONE 50 mg as follows:  Lantus 15 units QHS (home and current dosing)  Admelog 14 units TID before meals, HOLD if not eating (slight decrease in dosing given FS review)  PRE-MEAL ADMELOG DOSING WILL NEED TO BE DECREASED WHEN STEROIDS ARE DECREASED  Patient should call Dr Dominguez, her outpatient endocrinologist for further direction regarding insulin dosing upon discharge   Patient should call Dr Dominguez if HYPOGLYCEMIA occurs for direction on decreasing insulin dosing   Discussed with primary team   Endocrine      CAPILLARY BLOOD GLUCOSE      POCT Blood Glucose.: 170 mg/dL (30 Jun 2023 08:44)  POCT Blood Glucose.: 98 mg/dL (29 Jun 2023 22:29)  POCT Blood Glucose.: 166 mg/dL (29 Jun 2023 17:02)  POCT Blood Glucose.: 223 mg/dL (29 Jun 2023 12:19)    MEDICATIONS  (STANDING):  dextrose 5%. 1000 milliLiter(s) (50 mL/Hr) IV Continuous <Continuous>  dextrose 50% Injectable 25 Gram(s) IV Push once  enoxaparin Injectable 40 milliGRAM(s) SubCutaneous every 24 hours  folic acid 1 milliGRAM(s) Oral daily  glucagon  Injectable 1 milliGRAM(s) IntraMuscular once  insulin glargine Injectable (LANTUS) 15 Unit(s) SubCutaneous at bedtime  insulin lispro (ADMELOG) corrective regimen sliding scale   SubCutaneous three times a day before meals  insulin lispro (ADMELOG) corrective regimen sliding scale   SubCutaneous at bedtime  insulin lispro Injectable (ADMELOG) 12 Unit(s) SubCutaneous three times a day before meals  predniSONE   Tablet 50 milliGRAM(s) Oral daily  remdesivir  IVPB   IV Intermittent   remdesivir  IVPB 100 milliGRAM(s) IV Intermittent every 24 hours    06-30    140  |  102  |  15  ----------------------------<  87  3.8   |  26  |  0.57    Ca    9.4      30 Jun 2023 06:08  Phos  4.6     06-30  Mg     2.20     06-30

## 2023-06-30 NOTE — DISCHARGE NOTE NURSING/CASE MANAGEMENT/SOCIAL WORK - PATIENT PORTAL LINK FT
You can access the FollowMyHealth Patient Portal offered by [facility] by registering at the following website: [email or web address]. By joining [employer]’s FollowMyHealth portal, you will also be able to view your health information using other applications (apps) compatible with our system.

## 2023-06-30 NOTE — PROGRESS NOTE ADULT - PROVIDER SPECIALTY LIST ADULT
Pulmonology
Hospitalist
Pulmonology
Pulmonology
Rheumatology
Pulmonology
Rheumatology
Pulmonology
Endocrinology
Pulmonology
Endocrinology
Hospitalist
Hospitalist
Endocrinology
Hospitalist

## 2023-06-30 NOTE — PROGRESS NOTE ADULT - SUBJECTIVE AND OBJECTIVE BOX
Interval Events:  - on 2L NC this AM, placed on room air with SpO2 92-94%  - last day remdesivir today     REVIEW OF SYSTEMS:  Negative except as documented above.      OBJECTIVE:  ICU Vital Signs Last 24 Hrs  T(C): 36.7 (30 Jun 2023 05:55), Max: 37 (29 Jun 2023 20:48)  T(F): 98.1 (30 Jun 2023 05:55), Max: 98.6 (29 Jun 2023 20:48)  HR: 78 (30 Jun 2023 05:55) (78 - 93)  BP: 120/72 (30 Jun 2023 05:55) (112/71 - 120/72)  BP(mean): --  ABP: --  ABP(mean): --  RR: 18 (30 Jun 2023 05:55) (17 - 18)  SpO2: 99% (30 Jun 2023 05:55) (94% - 99%)    O2 Parameters below as of 30 Jun 2023 08:01  Patient On (Oxygen Delivery Method): nasal cannula              06-29 @ 07:01  -  06-30 @ 07:00  --------------------------------------------------------  IN: 0 mL / OUT: 400 mL / NET: -400 mL      CAPILLARY BLOOD GLUCOSE      POCT Blood Glucose.: 170 mg/dL (30 Jun 2023 08:44)      PHYSICAL EXAM:  General: NAD  HEENT:  EOMI, sclera anicteric, moist mucus membranes  Neck: supple  Cardiovascular: RRR  Respiratory: CTAB, no wheezes, crackles, or rhonci  Abdomen: soft, nontender  Extremities: warm and well perfused, no edema, no clubbing  Skin: no rashes  Neurological: no focal deficits    HOSPITAL MEDICATIONS:  MEDICATIONS  (STANDING):  dextrose 5%. 1000 milliLiter(s) (50 mL/Hr) IV Continuous <Continuous>  dextrose 50% Injectable 25 Gram(s) IV Push once  enoxaparin Injectable 40 milliGRAM(s) SubCutaneous every 24 hours  folic acid 1 milliGRAM(s) Oral daily  glucagon  Injectable 1 milliGRAM(s) IntraMuscular once  insulin glargine Injectable (LANTUS) 15 Unit(s) SubCutaneous at bedtime  insulin lispro (ADMELOG) corrective regimen sliding scale   SubCutaneous three times a day before meals  insulin lispro (ADMELOG) corrective regimen sliding scale   SubCutaneous at bedtime  insulin lispro Injectable (ADMELOG) 12 Unit(s) SubCutaneous three times a day before meals  predniSONE   Tablet 50 milliGRAM(s) Oral daily  remdesivir  IVPB   IV Intermittent   remdesivir  IVPB 100 milliGRAM(s) IV Intermittent every 24 hours    MEDICATIONS  (PRN):  acetaminophen     Tablet .. 650 milliGRAM(s) Oral every 6 hours PRN Temp greater or equal to 38C (100.4F), Mild Pain (1 - 3)  aluminum hydroxide/magnesium hydroxide/simethicone Suspension 30 milliLiter(s) Oral every 4 hours PRN Dyspepsia  dextrose Oral Gel 15 Gram(s) Oral once PRN Blood Glucose LESS THAN 70 milliGRAM(s)/deciliter  guaiFENesin  milliGRAM(s) Oral every 12 hours PRN Cough  melatonin 3 milliGRAM(s) Oral at bedtime PRN Insomnia  ondansetron Injectable 4 milliGRAM(s) IV Push every 8 hours PRN Nausea and/or Vomiting      LABS:                        8.6    12.13 )-----------( 639      ( 30 Jun 2023 06:08 )             28.4     Hgb Trend: 8.6<--, 7.4<--, 7.4<--, 7.3<--, 7.4<--  06-30    140  |  102  |  15  ----------------------------<  87  3.8   |  26  |  0.57    Ca    9.4      30 Jun 2023 06:08  Phos  4.6     06-30  Mg     2.20     06-30      Creatinine Trend: 0.57<--, 0.58<--, 0.50<--, 0.58<--, 0.54<--, 0.36<--    Urinalysis Basic - ( 30 Jun 2023 06:08 )    Color: x / Appearance: x / SG: x / pH: x  Gluc: 87 mg/dL / Ketone: x  / Bili: x / Urobili: x   Blood: x / Protein: x / Nitrite: x   Leuk Esterase: x / RBC: x / WBC x   Sq Epi: x / Non Sq Epi: x / Bacteria: x            MICROBIOLOGY:       RADIOLOGY:  [x] Reviewed and interpreted by me

## 2023-06-30 NOTE — PROGRESS NOTE ADULT - ASSESSMENT
57 yo F with PMH SLE (dx ?12 years ago, not on tx for past 5 years, initially w/ joint pain and pleural effusion), new dx thymoma, recent admission to [facility] x 2 for shortness of breath (initially with covid, then represented with continued shortness of breath) who presents with shortness of breath and abnormal CT chest.     # SLE  # b/l ground glass and reticular opacities  # chronic hypoxemic respiratory failure  # thymoma  # covid  Differential is broad and difficult to narrow without prior records. Currently, CT chest shows diffuse bilateral ground glass and reticular opacities. Presumably, these were not present during admission to [facility] as pt had bronchoscopy and mediastinal mass biopsy w/o lung biopsy (this probably would have been done if there were parenchymal abnormalities). However, she was discharged on 3L home O2 which is her current oxygen requirement. She has difficulty speaking in full sentences, though this seems to be due to choking sensation rather than sob.   It seems that she is having difficulty managing secretions and may have ptosis on exam, ?myasthenia.  Rheumatology concerned about MDA-5 disease in setting of rapidly progressive lung disease and fever.  - procal 0.09  - BNP 43  - RVP negative initially, now covid +  - CXR : b/l reticular nodular opacities  - CTA Chest : no PE, 3.9 x 4.9 cm mediastinal mass corresponding to known thymoma. Bilateral diffuse groundglass and reticular opacities with mild bronchiectasis        Plan:  - f/u [facility] records w/ prior CT/MRI for comparison  - c/w empiric abx (on zosyn), complete 7 day course today  - duonebs q6  - continue aerobika  - sputum cx if able  - agree w/ steroids per rheum (solumedrol 60 qd -, solumedrol ), start prednisone 50 mg today , decrease by 10 mg every 7 days   - f/u MDA-5 ab, anti Glory, myomarker panel, dsDNA, ANTOINE, Sjogren, C3, C4, ferritin, myasthenia serologies  - quantiferon negative   - immunoglobulins low (Igg, iga, and igm) - this is likely due to thymoma; immunology c/s re: goods syndrome, will likely benefit from IVIG outpatient   - continue remdesivir (last day today), convalescent plasma given low immunoglobulin levels   - covid spike ab positive, nucleocapsid ab negative      Prior to discharge:  Please email: [email or web address] to setup an appointment prior to discharge. The appointment should be within 1-2 weeks of discharge from the hospital. Include the patient's name, , MRN and contact information in the email.      P[facility]  [address] [place] [address]  [phone]      Tatiana Guallpa MD  Pulmonary and Critical Care Fellow

## 2023-06-30 NOTE — PROGRESS NOTE ADULT - REASON FOR ADMISSION
Shortness of Breath

## 2023-07-01 LAB
CULTURE RESULTS: SIGNIFICANT CHANGE UP
CULTURE RESULTS: SIGNIFICANT CHANGE UP
SPECIMEN SOURCE: SIGNIFICANT CHANGE UP
SPECIMEN SOURCE: SIGNIFICANT CHANGE UP

## 2023-07-03 ENCOUNTER — APPOINTMENT (OUTPATIENT)
Dept: PULMONOLOGY | Facility: CLINIC | Age: 58
End: 2023-07-03

## 2023-07-03 LAB
ACHR MOD AB SER-ACNC: 0 % — SIGNIFICANT CHANGE UP (ref 0–45)
ZINC TRANSPORTER 8 AB, RESULT: <15 U/ML — SIGNIFICANT CHANGE UP

## 2023-07-05 LAB
DEPRECATED S PNEUM 1 IGG SER-MCNC: 2.6 MCG/ML — SIGNIFICANT CHANGE UP
DEPRECATED S PNEUM12 IGG SER-MCNC: 1 MCG/ML — SIGNIFICANT CHANGE UP
DEPRECATED S PNEUM14 IGG SER-MCNC: 2.3 MCG/ML — SIGNIFICANT CHANGE UP
DEPRECATED S PNEUM17 IGG SER-MCNC: 14.2 MCG/ML — SIGNIFICANT CHANGE UP
DEPRECATED S PNEUM19 IGG SER-MCNC: 10 MCG/ML — SIGNIFICANT CHANGE UP
DEPRECATED S PNEUM19 IGG SER-MCNC: 4.6 MCG/ML — SIGNIFICANT CHANGE UP
DEPRECATED S PNEUM2 IGG SER-MCNC: 0.6 MCG/ML — SIGNIFICANT CHANGE UP
DEPRECATED S PNEUM20 IGG SER-MCNC: 4 MCG/ML — SIGNIFICANT CHANGE UP
DEPRECATED S PNEUM22 IGG SER-MCNC: 27.9 MCG/ML — SIGNIFICANT CHANGE UP
DEPRECATED S PNEUM23 IGG SER-MCNC: 20.8 MCG/ML — SIGNIFICANT CHANGE UP
DEPRECATED S PNEUM3 IGG SER-MCNC: 1.9 MCG/ML — SIGNIFICANT CHANGE UP
DEPRECATED S PNEUM4 IGG SER-MCNC: 0.5 MCG/ML — SIGNIFICANT CHANGE UP
DEPRECATED S PNEUM5 IGG SER-MCNC: 1.9 MCG/ML — SIGNIFICANT CHANGE UP
DEPRECATED S PNEUM8 IGG SER-MCNC: 5.7 MCG/ML — SIGNIFICANT CHANGE UP
DEPRECATED S PNEUM9 IGG SER-MCNC: 1.7 MCG/ML — SIGNIFICANT CHANGE UP
DEPRECATED S PNEUM9 IGG SER-MCNC: SIGNIFICANT CHANGE UP MCG/ML
GAD65 AB SER-MCNC: 0 NMOL/L — SIGNIFICANT CHANGE UP
ISLET CELL512 AB SER-SCNC: 0.01 NMOL/L — SIGNIFICANT CHANGE UP
LYMPHOCYTE PROLIF MITOGEN PNL BLD FC: SIGNIFICANT CHANGE UP
MUSK IGG SER IA-MCNC: SIGNIFICANT CHANGE UP
S PNEUM SEROTYPE IGG SER-IMP: 0.4 MCG/ML — SIGNIFICANT CHANGE UP
S PNEUM SEROTYPE IGG SER-IMP: 0.8 MCG/ML — SIGNIFICANT CHANGE UP
S PNEUM SEROTYPE IGG SER-IMP: 1 MCG/ML — SIGNIFICANT CHANGE UP
S PNEUM SEROTYPE IGG SER-IMP: 10.9 MCG/ML — SIGNIFICANT CHANGE UP
S PNEUM SEROTYPE IGG SER-IMP: 2.1 MCG/ML — SIGNIFICANT CHANGE UP
S PNEUM SEROTYPE IGG SER-IMP: 8.9 MCG/ML — SIGNIFICANT CHANGE UP
S PNEUM SEROTYPE IGG SER-IMP: 9 MCG/ML — SIGNIFICANT CHANGE UP

## 2023-07-06 ENCOUNTER — APPOINTMENT (OUTPATIENT)
Dept: RHEUMATOLOGY | Facility: CLINIC | Age: 58
End: 2023-07-06
Payer: MEDICAID

## 2023-07-06 ENCOUNTER — TRANSCRIPTION ENCOUNTER (OUTPATIENT)
Age: 58
End: 2023-07-06

## 2023-07-06 VITALS
TEMPERATURE: 97.1 F | SYSTOLIC BLOOD PRESSURE: 160 MMHG | WEIGHT: 142 LBS | HEIGHT: 60 IN | HEART RATE: 111 BPM | DIASTOLIC BLOOD PRESSURE: 95 MMHG | BODY MASS INDEX: 27.88 KG/M2 | OXYGEN SATURATION: 95 %

## 2023-07-06 LAB
ACHR BLOCK AB SER-ACNC: 14 % — SIGNIFICANT CHANGE UP (ref 0–25)
INSULIN ANTIBODIES: <5 UU/ML — SIGNIFICANT CHANGE UP

## 2023-07-06 PROCEDURE — 99203 OFFICE O/P NEW LOW 30 MIN: CPT

## 2023-07-06 PROCEDURE — 99213 OFFICE O/P EST LOW 20 MIN: CPT

## 2023-07-06 NOTE — PHYSICAL EXAM
[General Appearance - Alert] : alert [General Appearance - In No Acute Distress] : in no acute distress [General Appearance - Well Nourished] : well nourished [General Appearance - Well Developed] : well developed [Sclera] : the sclera and conjunctiva were normal [Outer Ear] : the ears and nose were normal in appearance [Nasal Cavity] : the nasal mucosa and septum were normal [Neck Appearance] : the appearance of the neck was normal [] : no respiratory distress [Respiration, Rhythm And Depth] : normal respiratory rhythm and effort [Exaggerated Use Of Accessory Muscles For Inspiration] : no accessory muscle use [Auscultation Breath Sounds / Voice Sounds] : lungs were clear to auscultation bilaterally [Heart Rate And Rhythm] : heart rate was normal and rhythm regular [Heart Sounds] : normal S1 and S2 [Edema] : there was no peripheral edema [Abdomen Soft] : soft [Abdomen Tenderness] : non-tender [Cervical Lymph Nodes Enlarged Posterior Bilaterally] : posterior cervical [Cervical Lymph Nodes Enlarged Anterior Bilaterally] : anterior cervical [Supraclavicular Lymph Nodes Enlarged Bilaterally] : supraclavicular [Abnormal Walk] : normal gait [Nail Clubbing] : no clubbing  or cyanosis of the fingernails [Musculoskeletal - Swelling] : no joint swelling seen [Motor Tone] : muscle strength and tone were normal [No Focal Deficits] : no focal deficits [Oriented To Time, Place, And Person] : oriented to person, place, and time [FreeTextEntry1] : erythema over bilateral shins, multiple scattered ecchymoses

## 2023-07-06 NOTE — HISTORY OF PRESENT ILLNESS
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 Was started on oxygen\par \par - DM\par \par Medications:\par Lantus\par Lispro\par Bactrim\par prednisone 50 mg daily\par folic acid\par \par PSH:\par hysterectomy\par \par Family history:\par Mother: Grave's disease, ? rheumatoid arthritis\par \par Social:\par ETOH: no\par Smoking: no\par Drug: no\par

## 2023-07-06 NOTE — ASSESSMENT
[FreeTextEntry1] : 58 year old female with recent COVID infection and hospitalization with acute respiratory failure; also found to have thymoma and immunodeficiency - diagnosed with Good syndrome. Recent AI 6/2023 negative, negative dsDNA and normal complement; also, no signs or symptoms of SLE clinically at this time.\par \par Plan:\par - no further work-up needed for SLE at this time; advised on symptoms to be aware of that should prompt reevaluation\par - return PRN\par \par Sebastian Reddy MD, PhD\par Phone: [phone]\par Email: [email or web address]\par

## 2023-07-12 ENCOUNTER — NON-APPOINTMENT (OUTPATIENT)
Age: 58
End: 2023-07-12

## 2023-07-12 ENCOUNTER — APPOINTMENT (OUTPATIENT)
Dept: PULMONOLOGY | Facility: CLINIC | Age: 58
End: 2023-07-12
Payer: MEDICAID

## 2023-07-12 VITALS
RESPIRATION RATE: 16 BRPM | OXYGEN SATURATION: 94 % | WEIGHT: 144.5 LBS | HEART RATE: 110 BPM | HEIGHT: 60 IN | SYSTOLIC BLOOD PRESSURE: 142 MMHG | BODY MASS INDEX: 28.37 KG/M2 | DIASTOLIC BLOOD PRESSURE: 78 MMHG | TEMPERATURE: 98.1 F

## 2023-07-12 LAB
COVID-19 NUCLEOCAPSID  GAM ANTIBODY INTERPRETATION: NEGATIVE
COVID-19 SPIKE DOMAIN ANTIBODY INTERPRETATION: POSITIVE
SARS-COV-2 AB SERPL IA-ACNC: >250 U/ML
SARS-COV-2 AB SERPL QL IA: 0.18 INDEX

## 2023-07-12 PROCEDURE — 99215 OFFICE O/P EST HI 40 MIN: CPT

## 2023-07-13 ENCOUNTER — APPOINTMENT (OUTPATIENT)
Dept: FAMILY MEDICINE | Facility: CLINIC | Age: 58
End: 2023-07-13
Payer: MEDICAID

## 2023-07-13 VITALS
BODY MASS INDEX: 28.66 KG/M2 | OXYGEN SATURATION: 95 % | WEIGHT: 146 LBS | HEART RATE: 107 BPM | DIASTOLIC BLOOD PRESSURE: 81 MMHG | SYSTOLIC BLOOD PRESSURE: 130 MMHG | HEIGHT: 60 IN | TEMPERATURE: 97.2 F

## 2023-07-13 DIAGNOSIS — Z02.9 ENCOUNTER FOR ADMINISTRATIVE EXAMINATIONS, UNSPECIFIED: ICD-10-CM

## 2023-07-13 PROCEDURE — 99214 OFFICE O/P EST MOD 30 MIN: CPT

## 2023-07-13 NOTE — REASON FOR VISIT
[Follow-Up - From Hospitalization] : a follow-up visit after a recent hospitalization [Abnormal CXR/ Chest CT] : an abnormal CXR/ chest CT [Shortness of Breath] : shortness of breath

## 2023-07-13 NOTE — HISTORY OF PRESENT ILLNESS
[Never] : never [TextBox_4] : 58 year old female recently admitted to [facility] for dyspnea and hypoxemic respiratory failure here for hospital follow up.\par \par Comorbid medical conditions include recently diagnosed thymoma, hypogammaglobulinemia suspicious for Good syndrome, and history of SLE, diagnosed ~ 12 years prior, not on treatment for the last 5 years, COVID infection 5/2023.\par \par She was admitted to [facility] in 5/2023 and was found to be positive for COVID and treated with steroids of discharged home. On follow up with PCP, she was sent back to the hospital and found to have a mediastinal mass with biopsy revealing a thymoma. She was discharged home with oxygen at 3LPM. She had progressive dyspnea and was seen by Dr. Lukas Barton and was sent to [facility]. During the hospitalization, there was concern for rapidly progressive ILD and started on high dose steroids. However, COVID PCR was found to be positive with negative COVID nucleocapsid ab and low spike protein antibody concerning for persistent COVID in setting of immunodeficiency (Good Syndrome). She received a course of Remdesivir and given convalescent plasma with significant improvement in respiratory symptoms. \par \par She was discharged on a prednisone taper and bactrim prophylaxis. She is doing well does not require oxygen at rest and short distances. Improving exercise tolerance. \par \par \par

## 2023-07-13 NOTE — ASSESSMENT
[FreeTextEntry1] : 58 year old female with thymoma, hypogammaglobulinemia suspicious for Good syndrome, with chronic respiratory failure in setting of persistent COVID infection.\par Significant clinical improvement.\par No longer requires oxygen at rest and only with significant exertion.\par Continue steroid taper as ordered with Bactrim.\par Will order nocturnal oximetry on room air to assess need for nocturnal oxygen oxygen which she can continue for now.\par Myomarker panel is pending.\par She will need follow-up with immunology for further investigation and IVIG.\par Check PFT and CT chest next visit.\par \par Follow up in 2 months, sooner if needed.

## 2023-07-13 NOTE — HISTORY OF PRESENT ILLNESS
[de-identified] : Pt was hospitalized again in [facility] from 6/23-30/23. Pt was tested again positive for covid s/p treatment during hospitalization. Pt was also diagnosed "good syndrom" and was advised to see immunologist. Pt's condition has improved continuously, but still needs portable oxygen therapy. Currently pt used 1L/NC now. \par \par Pt brought transportation form to office. Pt needs to see multiple specialties now for her care. \par Pathology of lung mass biopsy showed Thymoma. Pt was referred to thoracic surgeon for consultation.

## 2023-07-17 NOTE — CONSULT NOTE ADULT - SUBJECTIVE AND OBJECTIVE BOX
Detail Level: Generalized   HPI:  59 yo F with PMH SLE (dx ?12 years ago, not on tx for past 5 years, initially w/ joint pain and pleural effusion), new dx thymoma, recent admission to [facility] x 2 for shortness of breath (initially with covid, then represented with continued shortness of breath) who presents with shortness of breath.   She was admitted to [facility] in 5/2023 and was found to be covid positive. She was treated with steroids and discharged home ?without oxygen. She then followed up with her PCP who was concerned about her shortness of breath. She recommended that she return to the hospital. She was readmitted and found to have a mediastinal mass. She had CT chest and MRI chest. She underwent bronchoscopy with mediastinal mass biopsy (thymoma), no lung biopsy done. She was then discharged 1 week ago. She was discharged with 3L home O2.  She has noticed progressively worsening dyspnea since discharge over the past week. Can only walk to bathroom and back. Was seen at pulmonary Dr. Lukas Barton and was not able to perform PFTs due to sob. He recommended she come to the ED for evaluation (specifically of thymoma by CTS).  She reports cough but difficulty expectorating sputum. Has inhaler at home (unsure of name) that she was told to use once daily. Has had fever, chills, night sweats. Also reports chest discomfort that she attributes to the thymoma.   She reports dx of SLE was made around 12 years ago at [facility]. Presenting symptoms were joint pain and pleural effusion. She was treated with steroids and plaquenil. Has not been on treatment for past 5 years. Was on about 2 weeks of steroids during 5/2023 admission to [facility], then 10 days steroids during admission 1 week ago. Was not dc on steroids.       On admission to ED, SpO2 100 on 5L NC. Afebrile. WBC 9. VBG 7.36/54. RVP negative.  CXR 6/22: b/l reticular nodular opacities  CTA Chest 6/22: no PE, 3.9 x 4.9 cm mediastinal mass corresponding to known thymoma.  bilateral diffuse groundglass and reticular opacities with mild bronchiectasis     Rheumatology consulted regarding SLE, pulmonary consulted for abnormal CT chest and sob. CTS c/s re: thymoma.        PAST MEDICAL & SURGICAL HISTORY:  H/O thymoma      Type 2 diabetes mellitus      Systemic lupus      S/P hysterectomy          FAMILY HISTORY:  FH: type 2 diabetes        SOCIAL HISTORY:  Smoking: [x] Never Smoked [ ] Former Smoker (__ packs x ___ years) [ ] Current Smoker  (__ packs x ___ years)  Substance Use: [ ] Never Used [ ] Used ____  EtOH Use:  Marital Status: [ ] Single [ ]  [ ]  [ ]   Sexual History:   Occupation: finance  Recent Travel: none  Country of Birth:  Advance Directives:    Allergies    amoxicillin (Rash (Mild to Mod))    Intolerances        HOME MEDICATIONS:  Home Medications:  Admelog 100 units/mL injectable solution: 5 unit(s) injectable 3 times a day (before meals) (23 Jun 2023 09:25)  folic acid 1 mg oral tablet: 1 tab(s) orally once a day (23 Jun 2023 09:25)  Lantus 100 units/mL subcutaneous solution: 15 unit(s) subcutaneous once a day (at bedtime) (23 Jun 2023 09:25)      REVIEW OF SYSTEMS:  All systems negative except as documented above.    OBJECTIVE:  ICU Vital Signs Last 24 Hrs  T(C): 37.4 (23 Jun 2023 18:30), Max: 38.9 (22 Jun 2023 21:38)  T(F): 99.3 (23 Jun 2023 18:30), Max: 102 (22 Jun 2023 21:38)  HR: 125 (23 Jun 2023 18:30) (84 - 126)  BP: 134/83 (23 Jun 2023 18:30) (109/65 - 134/83)  BP(mean): --  ABP: --  ABP(mean): --  RR: 18 (23 Jun 2023 18:30) (16 - 22)  SpO2: 99% (23 Jun 2023 18:30) (98% - 100%)    O2 Parameters below as of 23 Jun 2023 18:30  Patient On (Oxygen Delivery Method): nasal cannula  O2 Flow (L/min): 3            CAPILLARY BLOOD GLUCOSE      POCT Blood Glucose.: 219 mg/dL (23 Jun 2023 17:43)      PHYSICAL EXAM:  General: NAD  HEENT: EOMI, sclera anicteric  Neck: supple  Cardiovascular: RR  Respiratory: poor inspiratory effort  Abdomen: soft  Extremities: warm and well perfused, no edema, no clubbing  Skin: no rashes  Neurological: AOx3, ?ptosis     HOSPITAL MEDICATIONS:  Standing Meds:  acetylcysteine 10%  Inhalation 4 milliLiter(s) Inhalation every 8 hours  albuterol/ipratropium for Nebulization 3 milliLiter(s) Nebulizer every 6 hours  dextrose 5%. 1000 milliLiter(s) IV Continuous <Continuous>  dextrose 50% Injectable 25 Gram(s) IV Push once  enoxaparin Injectable 40 milliGRAM(s) SubCutaneous every 24 hours  folic acid 1 milliGRAM(s) Oral daily  glucagon  Injectable 1 milliGRAM(s) IntraMuscular once  insulin glargine Injectable (LANTUS) 10 Unit(s) SubCutaneous at bedtime  insulin lispro (ADMELOG) corrective regimen sliding scale   SubCutaneous three times a day before meals  insulin lispro Injectable (ADMELOG) 2 Unit(s) SubCutaneous three times a day before meals  methylPREDNISolone sodium succinate Injectable 60 milliGRAM(s) IV Push daily  piperacillin/tazobactam IVPB.. 3.375 Gram(s) IV Intermittent every 8 hours  vancomycin  IVPB 1000 milliGRAM(s) IV Intermittent every 12 hours      PRN Meds:  acetaminophen     Tablet .. 650 milliGRAM(s) Oral every 6 hours PRN  aluminum hydroxide/magnesium hydroxide/simethicone Suspension 30 milliLiter(s) Oral every 4 hours PRN  dextrose Oral Gel 15 Gram(s) Oral once PRN  melatonin 3 milliGRAM(s) Oral at bedtime PRN  ondansetron Injectable 4 milliGRAM(s) IV Push every 8 hours PRN      LABS:                        8.5    8.15  )-----------( 419      ( 23 Jun 2023 08:51 )             28.6     Hgb Trend: 8.5<--, 8.3<--  06-23    139  |  101  |  5<L>  ----------------------------<  181<H>  3.6   |  26  |  0.36<L>    Ca    9.0      23 Jun 2023 08:51  Phos  3.3     06-23  Mg     2.10     06-23    TPro  6.4  /  Alb  3.1<L>  /  TBili  0.5  /  DBili  x   /  AST  47<H>  /  ALT  28  /  AlkPhos  123<H>  06-22    Creatinine Trend: 0.36<--, 0.46<--  PT/INR - ( 22 Jun 2023 21:12 )   PT: 14.4 sec;   INR: 1.24 ratio         PTT - ( 22 Jun 2023 21:12 )  PTT:32.3 sec  Urinalysis Basic - ( 23 Jun 2023 08:51 )    Color: x / Appearance: x / SG: x / pH: x  Gluc: 181 mg/dL / Ketone: x  / Bili: x / Urobili: x   Blood: x / Protein: x / Nitrite: x   Leuk Esterase: x / RBC: x / WBC x   Sq Epi: x / Non Sq Epi: x / Bacteria: x        Venous Blood Gas:  06-22 @ 19:35  7.36/54/20/30/22.9  VBG Lactate: 2.3      MICROBIOLOGY:       RADIOLOGY:  [x] Reviewed and interpreted by me   Detail Level: Zone Detail Level: Simple Detail Level: Detailed

## 2023-07-18 ENCOUNTER — LABORATORY RESULT (OUTPATIENT)
Age: 58
End: 2023-07-18

## 2023-07-18 ENCOUNTER — APPOINTMENT (OUTPATIENT)
Dept: PEDIATRIC ALLERGY IMMUNOLOGY | Facility: CLINIC | Age: 58
End: 2023-07-18
Payer: MEDICAID

## 2023-07-18 VITALS
HEART RATE: 103 BPM | DIASTOLIC BLOOD PRESSURE: 83 MMHG | OXYGEN SATURATION: 97 % | BODY MASS INDEX: 28.47 KG/M2 | HEIGHT: 60 IN | SYSTOLIC BLOOD PRESSURE: 133 MMHG | TEMPERATURE: 97.3 F | WEIGHT: 145 LBS

## 2023-07-18 LAB
ANTI PM-SCL-100 PLUS: <20 UNITS — SIGNIFICANT CHANGE UP
ANTI-SAE 1 IGG: <20 UNITS — SIGNIFICANT CHANGE UP
ANTI-SS-A 52 KD AB, IGG PLUS: <20 UNITS — SIGNIFICANT CHANGE UP
ANTI-U1-RNP AB PLUS: <20 UNITS — SIGNIFICANT CHANGE UP
EJ MYOMARKER3 PLUS: NEGATIVE — SIGNIFICANT CHANGE UP
ENA JO1 AB SER IA-ACNC: <20 UNITS — SIGNIFICANT CHANGE UP
FIBRILLARIN (U3 RNP) PLUS: NEGATIVE — SIGNIFICANT CHANGE UP
KU MYOMARKER3 PLUS: NEGATIVE — SIGNIFICANT CHANGE UP
MDA5 (P140)(CADM-140) PLUS: <20 UNITS — SIGNIFICANT CHANGE UP
MI-2 PLUS: NEGATIVE — SIGNIFICANT CHANGE UP
NXP-2 (P140) MYOPLUS: <20 UNITS — SIGNIFICANT CHANGE UP
OJ MYOMARKER3 PLUS: NEGATIVE — SIGNIFICANT CHANGE UP
PL-12 PLUS: NEGATIVE — SIGNIFICANT CHANGE UP
PL-7 PLUS: NEGATIVE — SIGNIFICANT CHANGE UP
SRP MYOMARKER3 PLUS: NEGATIVE — SIGNIFICANT CHANGE UP
TIF GAMMA (P155/140) PLUS: <20 UNITS — SIGNIFICANT CHANGE UP
U2 SNRNP PLUS: NEGATIVE — SIGNIFICANT CHANGE UP

## 2023-07-18 PROCEDURE — 99245 OFF/OP CONSLTJ NEW/EST HI 55: CPT

## 2023-07-19 LAB
ALBUMIN SERPL ELPH-MCNC: 4.3 G/DL
ALP BLD-CCNC: 75 U/L
ALT SERPL-CCNC: 17 U/L
ANION GAP SERPL CALC-SCNC: 14 MMOL/L
AST SERPL-CCNC: 16 U/L
BILIRUB SERPL-MCNC: 0.8 MG/DL
BUN SERPL-MCNC: 20 MG/DL
CALCIUM SERPL-MCNC: 9.8 MG/DL
CD16+CD56+ CELLS # BLD: 493 CELLS/UL
CD16+CD56+ CELLS NFR BLD: 24 %
CD19 CELLS NFR BLD: 0 CELLS/UL
CD3 CELLS # BLD: 1600 CELLS/UL
CD3 CELLS NFR BLD: 75 %
CD3+CD4+ CELLS # BLD: 295 CELLS/UL
CD3+CD4+ CELLS NFR BLD: 14 %
CD3+CD4+ CELLS/CD3+CD8+ CLL SPEC: 0.23 RATIO
CD3+CD8+ CELLS # SPEC: 1296 CELLS/UL
CD3+CD8+ CELLS NFR BLD: 60 %
CELLS.CD3-CD19+/CELLS IN BLOOD: <1 %
CHLORIDE SERPL-SCNC: 107 MMOL/L
CO2 SERPL-SCNC: 22 MMOL/L
CREAT SERPL-MCNC: 0.7 MG/DL
EGFR: 100 ML/MIN/1.73M2
GLUCOSE SERPL-MCNC: 169 MG/DL
LRP4 AUTOANTIBODY TEST: SIGNIFICANT CHANGE UP
POTASSIUM SERPL-SCNC: 4.7 MMOL/L
PROT SERPL-MCNC: 5.9 G/DL
SODIUM SERPL-SCNC: 142 MMOL/L

## 2023-07-19 NOTE — HISTORY OF PRESENT ILLNESS
[de-identified] : Ms. Saldnaa is a 57 y/o female with a history of SLE with reportedly positive anti-dsDNA antibodies diagnosed 10 years ago previously on HCQ which she has been off of for several years, thalassemia, newly diagnosed insulin dependent diabetes, persistent COVID with chronic hypoxic respiratory failure, new O2 requirement, and newly diagnosed Good syndrome who presents for hospital follow-up. \par \par Interval history:\par - Feels much better since being discharged from the hospital. Is now down to 1L O2 only with activity\par - Is on a long steroid taper for lung disease. Currently on prednisone 30mg daily and bactrim prophylaxis\par - Seen by Dr. Brown of pulm on \par - Seen by Dr Reddy of rheumatology. She serologically now has no evidence of SLE. \par \par Inpatient consult 2023 --\par She states that until 2 months ago she was healthy (aside from SLE which was asymptomatic) when she began to feel fatigued. She presented to an urgent care where she was diagnosed with COVID and told to go to the hospital. The details of her previous hospitalizations are unclear, however she was treated with antibiotics and given a new diagnosis of insulin dependent diabetes. She continued to have symptoms after discharged and presented to another hospital. There she was found to have a thymoma and underwent biopsy and bronchoscopy (unclear what the results of bronchoscopy were) and discharged on oxygen. She presented to [facility] with ongoing fevers and SOB.\par \par On presentation here, she was hemodynamically stable requiring 3L NC. Her CBC showed Hb 8.3 (chronic) without leukocytosis, normal renal function, and mildly elevated ALT 40s and alk phos 120s. RVP was negative at that time. CXR showing bilateral reticulonodular opacities and CT PE re-demonstrating the thymoma as well, non-specific ground glass and reticular opacities with mild bronchiectasis. She was started on levofloxacin for empiric PNA treatment. Repeat RVP on  now positive for COVID-19. 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 She was started on methylprednisolone 60mg daily. Pulmonology was less suspicious for bacterial infection, but recommended empiric broadening of antibiotics to zosyn. Neurology consulted due to concern for myasthenia gravis given thymoma, lower suspicion but work-up ordered.\par \par Immunoglobulin panel was ordered and showed IgG 221 (L), IgA 51 (L), IgM <5 (L). Full T cell subsets showing absent B cells and mildly decreased CD4 T cells (in the setting of steroids). A&I consulted for hypogammaglobulinemia. \par \par On interview, she reports no significant history of frequent otosinopulmonary infections. She reports one "cold" per year. She was diagnosed with SLE 10 years ago due to joint pain in her hands which made it difficult to open jars, no significant cutaneous symptoms. She prescribed high dose steroids and tapered off and subsequently placed on HCQ. She stopped taking HCQ during COVID due to changes in her insurance and difficulty with access. \par \par \par Family history:\par Sister with thyroid disease, possibly developing rheumatoid arthritis. Brother developed diabetes in his 20s. Father  of colon cancer in his 50s\par No family history of recurrent infections, primary immunodeficiency\par Mother with dementia\par No history of recurrent miscarriages, recurrent infections, or frequent antibiotic use\par Family is from [place] and parents are not related\par \par Social history:\par Worked as an

## 2023-07-19 NOTE — ADDENDUM
[FreeTextEntry1] : CBC with WBC 11k likely d/t steroids, Hb 10.4 which is chronic due to thalassemia\par Full T cell subsets w/ CD3 1600 (nl), CD4 295 (L), CD8 1296 (H), CD19 0 (L), PD1177 493 (H). CD4 lymphopenia with altered CD4/CD8 ratio and absent peripheral B cells is consistent with Good syndrome. Numbers also confounded by ongoing steroid use. \par CMP with normal renal function, non-fasting sugars elevated at 169

## 2023-07-19 NOTE — REVIEW OF SYSTEMS
[Nl] : Genitourinary [Exercise Intolerance] : persistence of exercise intolerance [SOB with Exertion] : dyspnea on exertion [FreeTextEntry6] : requires nasal O2 when walking outside [FreeTextEntry9] : history of lupus [de-identified] : Persistent COVID infection; thymoma

## 2023-07-19 NOTE — CONSULT LETTER
[Dear  ___] : Dear  [unfilled], [Consult Letter:] : I had the pleasure of evaluating your patient, [unfilled]. [Please see my note below.] : Please see my note below. [This report is provisional, pending the completion of the evaluation.  A final diagnosis and plan will follow.] : This report is provisional, pending the completion of the evaluation.  A final diagnosis and plan will follow. [Sincerely,] : Sincerely, [DrPing  ___] : Dr. MORTON [DrPing ___] : Dr. MORTON [FreeTextEntry3] : Sultan Andrea DO\par Fellow in Allergy/Immunology\par [facility]\par [facility]\par \par Celestine Cruz MD\par  for Academic Affairs, Department of Pediatrics\par Chief, Division of Allergy/Immunology\par [facility]\par \par Keith Davila Professor of Pediatrics, Professor of Molecular Medicine\par [facility] [employer] [facility]\par \par  \par Phone: [phone]\par Fax: [phone]

## 2023-07-19 NOTE — ASSESSMENT
[FreeTextEntry1] : Ms. 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 Given her significantly depressed immunoglobulin levels with absent B cells, will plan to start her on subcutaneous immunoglobulin replacement therapy. \par \par GOOD SYNDROME WITH HYPOGAMMAGLOBULINEMIA\par - CD19 = 1,  IgG 221 (L), IgA 51 (L), IgM <5 (L) \par - This syndrome typically presents around the 5th decade of life (median age 55-63 y/o) with thymoma, hypogammaglobulinemia, and absent B cells. The hypogammaglobulinemia puts them at high risk of otosinopulmonary infections and developing bronchiectasis which she already has evidence of. These patients have significant immune dysregulation present and at high risk of autoimmunity. \par -  Additionally, patients often have a component of cellular immunodeficiency. This places them at increased risk of fungal, viral, and opportunistic infections, especially while on high dose steroids. It is important to maintain a high index of suspicion for less common infectious culprits. \par - Discussed risks and benefits of immunoglobulin replacement therapy (IGRT). She would like to proceed with subcutaneous IGRT twice monthly. Will send prior authorization to start Cuvitru 400mg/kg. Will get immunoglobulin panel 6 weeks after first dose and adjust dose accordingly\par - Will repeat CBC w/ diff, full T cell subsets, and obtain mitogen stimulation assay as well as CMP\par - Recommend prompt evaluation of any suspected infection and low thresh hold to treat with antibiotics\par - Continue Bactrim for PJP prophylaxis while on steroids\par - Recommend yearly flu vaccine. Other vaccines are unlikely to be of benefit while on IGRT\par \par THYMOMA\par - Recommend follow-up outpatient with cardiothoracic surgeon once her condition has further stabilized to evaluate for resection of thymoma\par \par History of SLE, JOINT PAIN\par - History of SLE, but recent serology negative\par - She has follow-up with Dr. Hoffmann in the near future who managed her previously\par \par Follow-up in 3 months. \par Labs drawn in office by CHELSEA Whatley

## 2023-07-19 NOTE — PHYSICAL EXAM
[Alert] : alert [Well Nourished] : well nourished [Healthy Appearance] : healthy appearance [No Acute Distress] : no acute distress [Well Developed] : well developed [Normal Pupil & Iris Size/Symmetry] : normal pupil and iris size and symmetry [No Discharge] : no discharge [No Photophobia] : no photophobia [Sclera Not Icteric] : sclera not icteric [Normal TMs] : both tympanic membranes were normal [Normal Nasal Mucosa] : the nasal mucosa was normal [Normal Lips/Tongue] : the lips and tongue were normal [Normal Outer Ear/Nose] : the ears and nose were normal in appearance [Normal Tonsils] : normal tonsils [No Thrush] : no thrush [Pale mucosa] : pale mucosa [Supple] : the neck was supple [Normal Rate and Effort] : normal respiratory rhythm and effort [No Crackles] : no crackles [No Retractions] : no retractions [Bilateral Audible Breath Sounds] : bilateral audible breath sounds [Normal Rate] : heart rate was normal  [Normal S1, S2] : normal S1 and S2 [No murmur] : no murmur [Regular Rhythm] : with a regular rhythm [Soft] : abdomen soft [Not Tender] : non-tender [Not Distended] : not distended [No HSM] : no hepato-splenomegaly [Normal Cervical Lymph Nodes] : cervical [Skin Intact] : skin intact  [No Rash] : no rash [No clubbing] : no clubbing [No Edema] : no edema [No Cyanosis] : no cyanosis [Normal Mood] : mood was normal [Normal Affect] : affect was normal [Alert, Awake, Oriented as Age-Appropriate] : alert, awake, oriented as age appropriate [de-identified] : wearing nasal O3 canula [de-identified] : 1L portable O2 [de-identified] : multiple ecchmoses around insulin injection sites

## 2023-07-24 ENCOUNTER — TRANSCRIPTION ENCOUNTER (OUTPATIENT)
Age: 58
End: 2023-07-24

## 2023-07-25 ENCOUNTER — NON-APPOINTMENT (OUTPATIENT)
Age: 58
End: 2023-07-25

## 2023-07-27 LAB — LPT PW BLD-NRATE: ABNORMAL

## 2023-07-28 ENCOUNTER — APPOINTMENT (OUTPATIENT)
Dept: RHEUMATOLOGY | Facility: CLINIC | Age: 58
End: 2023-07-28
Payer: MEDICAID

## 2023-07-28 ENCOUNTER — LABORATORY RESULT (OUTPATIENT)
Age: 58
End: 2023-07-28

## 2023-07-28 VITALS
RESPIRATION RATE: 16 BRPM | BODY MASS INDEX: 30.04 KG/M2 | OXYGEN SATURATION: 98 % | HEART RATE: 102 BPM | SYSTOLIC BLOOD PRESSURE: 143 MMHG | WEIGHT: 153 LBS | HEIGHT: 60 IN | DIASTOLIC BLOOD PRESSURE: 92 MMHG | TEMPERATURE: 97.1 F

## 2023-07-28 PROCEDURE — 99215 OFFICE O/P EST HI 40 MIN: CPT

## 2023-08-02 ENCOUNTER — APPOINTMENT (OUTPATIENT)
Dept: SLEEP CENTER | Facility: CLINIC | Age: 58
End: 2023-08-02

## 2023-08-03 LAB
ALBUMIN SERPL ELPH-MCNC: 4.5 G/DL
ALP BLD-CCNC: 70 U/L
ALT SERPL-CCNC: 19 U/L
ANION GAP SERPL CALC-SCNC: 13 MMOL/L
APPEARANCE: CLEAR
AST SERPL-CCNC: 18 U/L
BACTERIA: NEGATIVE /HPF
BASOPHILS # BLD AUTO: 0.08 K/UL
BASOPHILS NFR BLD AUTO: 0.6 %
BILIRUB SERPL-MCNC: 0.8 MG/DL
BILIRUBIN URINE: NEGATIVE
BLOOD URINE: NEGATIVE
BUN SERPL-MCNC: 13 MG/DL
C3 SERPL-MCNC: 116 MG/DL
C4 SERPL-MCNC: 35 MG/DL
CALCIUM SERPL-MCNC: 10.2 MG/DL
CAST: 0 /LPF
CHLORIDE SERPL-SCNC: 104 MMOL/L
CO2 SERPL-SCNC: 24 MMOL/L
COLOR: YELLOW
CREAT SERPL-MCNC: 0.57 MG/DL
CREAT SPEC-SCNC: 110 MG/DL
CREAT/PROT UR: 0.1 RATIO
DSDNA AB SER-ACNC: <12 IU/ML
EGFR: 105 ML/MIN/1.73M2
ENA RNP AB SER IA-ACNC: <0.2 AL
ENA SM AB SER IA-ACNC: <0.2 AL
ENA SS-A AB SER IA-ACNC: 0.5 AL
ENA SS-B AB SER IA-ACNC: <0.2 AL
EOSINOPHIL # BLD AUTO: 0.2 K/UL
EOSINOPHIL NFR BLD AUTO: 1.5 %
EPITHELIAL CELLS: 2 /HPF
ESTIMATED AVERAGE GLUCOSE: 120 MG/DL
FERRITIN SERPL-MCNC: 497 NG/ML
GLUCOSE QUALITATIVE U: NEGATIVE MG/DL
GLUCOSE SERPL-MCNC: 64 MG/DL
HBA1C MFR BLD HPLC: 5.8 %
HCT VFR BLD CALC: 38 %
HGB BLD-MCNC: 10.9 G/DL
IMM GRANULOCYTES NFR BLD AUTO: 0.5 %
KETONES URINE: NEGATIVE MG/DL
LEUKOCYTE ESTERASE URINE: ABNORMAL
LYMPHOCYTES # BLD AUTO: 3.62 K/UL
LYMPHOCYTES NFR BLD AUTO: 27.6 %
MAN DIFF?: NORMAL
MCHC RBC-ENTMCNC: 19.9 PG
MCHC RBC-ENTMCNC: 28.7 GM/DL
MCV RBC AUTO: 69.3 FL
MICROSCOPIC-UA: NORMAL
MONOCYTES # BLD AUTO: 0.69 K/UL
MONOCYTES NFR BLD AUTO: 5.3 %
NEUTROPHILS # BLD AUTO: 8.45 K/UL
NEUTROPHILS NFR BLD AUTO: 64.5 %
NITRITE URINE: NEGATIVE
PH URINE: 5.5
PLATELET # BLD AUTO: 494 K/UL
POTASSIUM SERPL-SCNC: 4.1 MMOL/L
PROT SERPL-MCNC: 6.1 G/DL
PROT UR-MCNC: 9 MG/DL
PROTEIN URINE: NEGATIVE MG/DL
RBC # BLD: 5.48 M/UL
RBC # FLD: 24 %
RED BLOOD CELLS URINE: 1 /HPF
SODIUM SERPL-SCNC: 142 MMOL/L
SPECIFIC GRAVITY URINE: 1.02
UROBILINOGEN URINE: 1 MG/DL
WBC # FLD AUTO: 13.11 K/UL
WHITE BLOOD CELLS URINE: 1 /HPF

## 2023-08-03 NOTE — ASSESSMENT
[FreeTextEntry1] : s/p refractory protracted COVID infection - improving. Good syndrome (thymoma, hypogammaglobulinemia, B cells absence) No clinical or serological features of systemic autoimmune disease, specifically SLE or mda 5 disease on High dose prednisone     Plan:  - labs - PFT - fu CT chest repeat - plan for 9/2023 - d/w pulmonary - sqIG suupplementation with AI - taper steroids : prednisone 10 mg x 10 days, 7.5 mg a day x10 days, 5 mg a day x 10 days, 2.5 mg x 10 days , 2.5 mg every other day x 10 days  RTO 4-6 weeks

## 2023-08-03 NOTE — HISTORY OF PRESENT ILLNESS
[FreeTextEntry1] : Fu after admission to [facility] in 5/2023 with severe dyspnea and hypoxemic respiratory failure, when she was diagnosed with severe COVID infection, protracted refractory course and was dx with immunodeficiency (Good Syndrome)  #  Dx Good syndrome (thymoma, absence of Bcells and hypogammaglobulinemia) She was discharged on a prednisone taper and bactrim prophylaxis. She has improved on this therapy: feels much better- does not require oxygen at rest and short distances. Improved exercise tolerance.  Immunoglobulin panel : IgG 221 (L), IgA 51 (L), IgM <5 (L). Full T cell subsets showing absent B cells and mildly decreased CD4 T cells (in the setting of steroids).   # History of SLE, that was diagnosed ~ 12 years prior with reportedly positive anti-dsDNA antibodies  tx previously with HCQ, has been off of for several years.  Recent 6/2023: AI negative, negative dsDNA and normal complement; also, no signs or symptoms of SLE clinically at this time. ROS:  No joint pain, swelling or stiffness. Rash over bilateral shins, pruritic No alopecia, oral/nasal ulcers. No Raynaud's. No sicca.   PMH: thalassemia, newly diagnosed insulin dependent diabetes

## 2023-08-03 NOTE — PHYSICAL EXAM
[General Appearance - Alert] : alert [General Appearance - In No Acute Distress] : in no acute distress [Respiration, Rhythm And Depth] : normal respiratory rhythm and effort [Heart Rate And Rhythm] : heart rate was normal and rhythm regular [Heart Sounds] : normal S1 and S2 [Edema] : there was no peripheral edema [Abdomen Soft] : soft [Musculoskeletal - Swelling] : no joint swelling seen [] : no rash [No Focal Deficits] : no focal deficits [Oriented To Time, Place, And Person] : oriented to person, place, and time [FreeTextEntry1] : bilateral few rales: on 3 min walk pulse ox decreased from 98% to 90% off oxygen

## 2023-08-08 ENCOUNTER — LABORATORY RESULT (OUTPATIENT)
Age: 58
End: 2023-08-08

## 2023-08-08 ENCOUNTER — APPOINTMENT (OUTPATIENT)
Dept: FAMILY MEDICINE | Facility: CLINIC | Age: 58
End: 2023-08-08
Payer: MEDICAID

## 2023-08-08 PROCEDURE — 36415 COLL VENOUS BLD VENIPUNCTURE: CPT

## 2023-08-09 LAB
25(OH)D3 SERPL-MCNC: 17.2 NG/ML
ALBUMIN SERPL ELPH-MCNC: 4 G/DL
ALP BLD-CCNC: 64 U/L
ALT SERPL-CCNC: 12 U/L
ANION GAP SERPL CALC-SCNC: 13 MMOL/L
APPEARANCE: CLEAR
AST SERPL-CCNC: 17 U/L
BILIRUB SERPL-MCNC: 0.6 MG/DL
BILIRUBIN URINE: NEGATIVE
BLOOD URINE: NEGATIVE
BUN SERPL-MCNC: 13 MG/DL
CALCIUM SERPL-MCNC: 9.5 MG/DL
CHLORIDE SERPL-SCNC: 106 MMOL/L
CHOLEST SERPL-MCNC: 232 MG/DL
CO2 SERPL-SCNC: 23 MMOL/L
COLOR: YELLOW
CREAT SERPL-MCNC: 0.64 MG/DL
CREAT SPEC-SCNC: 101 MG/DL
EGFR: 102 ML/MIN/1.73M2
ESTIMATED AVERAGE GLUCOSE: 108 MG/DL
GLUCOSE QUALITATIVE U: NEGATIVE MG/DL
GLUCOSE SERPL-MCNC: 106 MG/DL
HBA1C MFR BLD HPLC: 5.4 %
HDLC SERPL-MCNC: 69 MG/DL
KETONES URINE: NEGATIVE MG/DL
LDLC SERPL CALC-MCNC: 142 MG/DL
LEUKOCYTE ESTERASE URINE: NEGATIVE
MICROALBUMIN 24H UR DL<=1MG/L-MCNC: <1.2 MG/DL
MICROALBUMIN/CREAT 24H UR-RTO: NORMAL MG/G
NITRITE URINE: NEGATIVE
NONHDLC SERPL-MCNC: 163 MG/DL
PH URINE: 5.5
POTASSIUM SERPL-SCNC: 3.9 MMOL/L
PROT SERPL-MCNC: 5.9 G/DL
PROTEIN URINE: NEGATIVE MG/DL
SODIUM SERPL-SCNC: 142 MMOL/L
SPECIFIC GRAVITY URINE: 1.02
T3 SERPL-MCNC: 97 NG/DL
T3FREE SERPL-MCNC: 2.8 PG/ML
T4 FREE SERPL-MCNC: 1.3 NG/DL
T4 SERPL-MCNC: 6.5 UG/DL
TRIGL SERPL-MCNC: 116 MG/DL
TSH SERPL-ACNC: 1.32 UIU/ML
URATE SERPL-MCNC: 5.1 MG/DL
UROBILINOGEN URINE: 0.2 MG/DL

## 2023-08-11 ENCOUNTER — APPOINTMENT (OUTPATIENT)
Dept: PULMONOLOGY | Facility: CLINIC | Age: 58
End: 2023-08-11

## 2023-08-12 ENCOUNTER — APPOINTMENT (OUTPATIENT)
Dept: FAMILY MEDICINE | Facility: CLINIC | Age: 58
End: 2023-08-12
Payer: MEDICAID

## 2023-08-12 VITALS
OXYGEN SATURATION: 97 % | DIASTOLIC BLOOD PRESSURE: 80 MMHG | RESPIRATION RATE: 16 BRPM | SYSTOLIC BLOOD PRESSURE: 140 MMHG | TEMPERATURE: 97.6 F | BODY MASS INDEX: 30.04 KG/M2 | HEART RATE: 90 BPM | HEIGHT: 60 IN | WEIGHT: 153 LBS

## 2023-08-12 DIAGNOSIS — D75.839 THROMBOCYTOSIS, UNSPECIFIED: ICD-10-CM

## 2023-08-12 DIAGNOSIS — Z86.2 PERSONAL HISTORY OF DISEASES OF THE BLOOD AND BLOOD-FORMING ORGANS AND CERTAIN DISORDERS INVOLVING THE IMMUNE MECHANISM: ICD-10-CM

## 2023-08-12 PROCEDURE — 99214 OFFICE O/P EST MOD 30 MIN: CPT

## 2023-08-12 NOTE — HISTORY OF PRESENT ILLNESS
[de-identified] : Fasting glucose was 106, HBA1c 5.4, total cholesterol 232, , non-HDL cholesterol 163, RBC 5.52, HGB 11.3, MCV 68.1, RDW 19.9, platelets 436, ferritin 497, vitamin D 17.2. Pt was recalled back for abnormal test reports. Reports were reviewed with pt in details. Other blood tests came back wnl. Pt has been taking all her medications.

## 2023-08-16 LAB
EJ AB SER QL: NEGATIVE
ENA JO1 AB SER IA-ACNC: <20 UNITS
ENA PM/SCL AB SER-ACNC: <20 UNITS
ENA SM+RNP AB SER IA-ACNC: <20 UNITS
ENA SS-A IGG SER QL: 24 UNITS
FIBRILLARIN AB SER QL: NEGATIVE
KU AB SER QL: NEGATIVE
MDA-5 (P140)(CADM-140): <20 UNITS
MI2 AB SER QL: NEGATIVE
NXP-2 (P140): <20 UNITS
OJ AB SER QL: NEGATIVE
PL12 AB SER QL: NEGATIVE
PL7 AB SER QL: NEGATIVE
SRP AB SERPL QL: NEGATIVE
TIF GAMMA (P155/140): <20 UNITS
U2 SNRNP AB SER QL: NEGATIVE

## 2023-09-12 ENCOUNTER — APPOINTMENT (OUTPATIENT)
Dept: RHEUMATOLOGY | Facility: CLINIC | Age: 58
End: 2023-09-12
Payer: MEDICAID

## 2023-09-12 VITALS
BODY MASS INDEX: 30.63 KG/M2 | WEIGHT: 156 LBS | HEART RATE: 80 BPM | HEIGHT: 60 IN | SYSTOLIC BLOOD PRESSURE: 142 MMHG | DIASTOLIC BLOOD PRESSURE: 89 MMHG | OXYGEN SATURATION: 98 % | RESPIRATION RATE: 16 BRPM | TEMPERATURE: 97.1 F

## 2023-09-12 DIAGNOSIS — Z79.899 OTHER LONG TERM (CURRENT) DRUG THERAPY: ICD-10-CM

## 2023-09-12 DIAGNOSIS — Z87.09 PERSONAL HISTORY OF OTHER DISEASES OF THE RESPIRATORY SYSTEM: ICD-10-CM

## 2023-09-12 DIAGNOSIS — U07.1 COVID-19: ICD-10-CM

## 2023-09-12 PROCEDURE — 99214 OFFICE O/P EST MOD 30 MIN: CPT

## 2023-09-12 RX ORDER — SULFAMETHOXAZOLE AND TRIMETHOPRIM 800; 160 MG/1; MG/1
800-160 TABLET ORAL DAILY
Qty: 12 | Refills: 5 | Status: DISCONTINUED | COMMUNITY
Start: 2023-07-12 | End: 2023-09-12

## 2023-09-18 ENCOUNTER — APPOINTMENT (OUTPATIENT)
Dept: PULMONOLOGY | Facility: CLINIC | Age: 58
End: 2023-09-18
Payer: MEDICAID

## 2023-09-18 VITALS
SYSTOLIC BLOOD PRESSURE: 126 MMHG | DIASTOLIC BLOOD PRESSURE: 85 MMHG | WEIGHT: 157 LBS | HEIGHT: 70 IN | HEART RATE: 76 BPM | BODY MASS INDEX: 22.48 KG/M2

## 2023-09-18 DIAGNOSIS — Z23 ENCOUNTER FOR IMMUNIZATION: ICD-10-CM

## 2023-09-18 PROCEDURE — 94726 PLETHYSMOGRAPHY LUNG VOLUMES: CPT

## 2023-09-18 PROCEDURE — 90686 IIV4 VACC NO PRSV 0.5 ML IM: CPT

## 2023-09-18 PROCEDURE — 94729 DIFFUSING CAPACITY: CPT

## 2023-09-18 PROCEDURE — G0009: CPT

## 2023-09-18 PROCEDURE — 90472 IMMUNIZATION ADMIN EACH ADD: CPT

## 2023-09-18 PROCEDURE — ZZZZZ: CPT

## 2023-09-18 PROCEDURE — 90677 PCV20 VACCINE IM: CPT

## 2023-09-18 PROCEDURE — 94060 EVALUATION OF WHEEZING: CPT

## 2023-09-18 PROCEDURE — 99214 OFFICE O/P EST MOD 30 MIN: CPT | Mod: 25

## 2023-10-05 ENCOUNTER — NON-APPOINTMENT (OUTPATIENT)
Age: 58
End: 2023-10-05

## 2023-10-06 ENCOUNTER — APPOINTMENT (OUTPATIENT)
Dept: CT IMAGING | Facility: CLINIC | Age: 58
End: 2023-10-06

## 2023-10-06 ENCOUNTER — OUTPATIENT (OUTPATIENT)
Dept: OUTPATIENT SERVICES | Facility: HOSPITAL | Age: 58
LOS: 1 days | End: 2023-10-06
Payer: MEDICAID

## 2023-10-06 ENCOUNTER — RESULT REVIEW (OUTPATIENT)
Age: 58
End: 2023-10-06

## 2023-10-06 DIAGNOSIS — Z90.710 ACQUIRED ABSENCE OF BOTH CERVIX AND UTERUS: Chronic | ICD-10-CM

## 2023-10-06 PROCEDURE — 71250 CT THORAX DX C-: CPT | Mod: 26

## 2023-10-08 ENCOUNTER — NON-APPOINTMENT (OUTPATIENT)
Age: 58
End: 2023-10-08
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
## 2023-10-30 ENCOUNTER — LABORATORY RESULT (OUTPATIENT)
Age: 58
End: 2023-10-30

## 2023-10-30 ENCOUNTER — APPOINTMENT (OUTPATIENT)
Dept: PEDIATRIC ALLERGY IMMUNOLOGY | Facility: CLINIC | Age: 58
End: 2023-10-30
Payer: MEDICAID

## 2023-10-30 VITALS
HEART RATE: 85 BPM | BODY MASS INDEX: 29.45 KG/M2 | HEIGHT: 61 IN | WEIGHT: 156 LBS | OXYGEN SATURATION: 97 % | SYSTOLIC BLOOD PRESSURE: 107 MMHG | DIASTOLIC BLOOD PRESSURE: 47 MMHG

## 2023-10-30 PROCEDURE — 99215 OFFICE O/P EST HI 40 MIN: CPT | Mod: 25

## 2023-10-31 LAB
ALBUMIN SERPL ELPH-MCNC: 4.5 G/DL
ALP BLD-CCNC: 90 U/L
ALT SERPL-CCNC: 12 U/L
ANION GAP SERPL CALC-SCNC: 14 MMOL/L
APPEARANCE: CLEAR
AST SERPL-CCNC: 17 U/L
BASOPHILS # BLD AUTO: 0.05 K/UL
BASOPHILS NFR BLD AUTO: 0.6 %
BILIRUB SERPL-MCNC: 0.5 MG/DL
BILIRUBIN URINE: NEGATIVE
BLOOD URINE: NEGATIVE
BUN SERPL-MCNC: 17 MG/DL
CALCIUM SERPL-MCNC: 10.1 MG/DL
CD16+CD56+ CELLS # BLD: 694 CELLS/UL
CD16+CD56+ CELLS NFR BLD: 27 %
CD19 CELLS NFR BLD: 0 CELLS/UL
CD3 CELLS # BLD: 1777 CELLS/UL
CD3 CELLS NFR BLD: 71 %
CD3+CD4+ CELLS # BLD: 971 CELLS/UL
CD3+CD4+ CELLS NFR BLD: 39 %
CD3+CD4+ CELLS/CD3+CD8+ CLL SPEC: 1.4 RATIO
CD3+CD8+ CELLS # SPEC: 694 CELLS/UL
CD3+CD8+ CELLS NFR BLD: 28 %
CELLS.CD3-CD19+/CELLS IN BLOOD: <1 %
CHLORIDE SERPL-SCNC: 103 MMOL/L
CO2 SERPL-SCNC: 24 MMOL/L
COLOR: NORMAL
CREAT SERPL-MCNC: 0.63 MG/DL
DEPRECATED KAPPA LC FREE/LAMBDA SER: 1.01 RATIO
EGFR: 103 ML/MIN/1.73M2
EOSINOPHIL # BLD AUTO: 0.12 K/UL
EOSINOPHIL NFR BLD AUTO: 1.4 %
GLUCOSE QUALITATIVE U: NEGATIVE MG/DL
GLUCOSE SERPL-MCNC: 121 MG/DL
HCT VFR BLD CALC: 38.2 %
HGB BLD-MCNC: 11.9 G/DL
IGA SER QL IEP: 82 MG/DL
IGG SER QL IEP: 674 MG/DL
IGM SER QL IEP: <10 MG/DL
IMM GRANULOCYTES NFR BLD AUTO: 0.4 %
KAPPA LC CSF-MCNC: 0.67 MG/DL
KAPPA LC SERPL-MCNC: 0.68 MG/DL
KETONES URINE: NEGATIVE MG/DL
LEUKOCYTE ESTERASE URINE: ABNORMAL
LYMPHOCYTES # BLD AUTO: 2.58 K/UL
LYMPHOCYTES NFR BLD AUTO: 30.7 %
MAN DIFF?: NORMAL
MCHC RBC-ENTMCNC: 19.1 PG
MCHC RBC-ENTMCNC: 31.2 GM/DL
MCV RBC AUTO: 61.4 FL
MONOCYTES # BLD AUTO: 0.62 K/UL
MONOCYTES NFR BLD AUTO: 7.4 %
NEUTROPHILS # BLD AUTO: 5.01 K/UL
NEUTROPHILS NFR BLD AUTO: 59.5 %
NITRITE URINE: NEGATIVE
PH URINE: 5.5
PLATELET # BLD AUTO: 365 K/UL
POTASSIUM SERPL-SCNC: 4.1 MMOL/L
PROT SERPL-MCNC: 6.6 G/DL
PROTEIN URINE: NEGATIVE MG/DL
RBC # BLD: 6.22 M/UL
RBC # FLD: 18.3 %
SODIUM SERPL-SCNC: 141 MMOL/L
SPECIFIC GRAVITY URINE: 1.03
UROBILINOGEN URINE: 0.2 MG/DL
WBC # FLD AUTO: 8.41 K/UL

## 2023-11-06 DIAGNOSIS — Z12.39 ENCOUNTER FOR OTHER SCREENING FOR MALIGNANT NEOPLASM OF BREAST: ICD-10-CM

## 2023-11-07 ENCOUNTER — APPOINTMENT (OUTPATIENT)
Dept: RHEUMATOLOGY | Facility: CLINIC | Age: 58
End: 2023-11-07

## 2023-11-09 ENCOUNTER — NON-APPOINTMENT (OUTPATIENT)
Age: 58
End: 2023-11-09

## 2023-11-11 ENCOUNTER — APPOINTMENT (OUTPATIENT)
Dept: FAMILY MEDICINE | Facility: CLINIC | Age: 58
End: 2023-11-11
Payer: MEDICAID

## 2023-11-11 VITALS
HEIGHT: 61 IN | HEART RATE: 90 BPM | DIASTOLIC BLOOD PRESSURE: 78 MMHG | OXYGEN SATURATION: 98 % | SYSTOLIC BLOOD PRESSURE: 112 MMHG | BODY MASS INDEX: 30.58 KG/M2 | WEIGHT: 162 LBS | TEMPERATURE: 97.6 F

## 2023-11-11 PROCEDURE — 36415 COLL VENOUS BLD VENIPUNCTURE: CPT

## 2023-11-11 PROCEDURE — 99214 OFFICE O/P EST MOD 30 MIN: CPT | Mod: 25

## 2023-11-14 ENCOUNTER — APPOINTMENT (OUTPATIENT)
Dept: FAMILY MEDICINE | Facility: CLINIC | Age: 58
End: 2023-11-14
Payer: MEDICAID

## 2023-11-14 PROCEDURE — 36415 COLL VENOUS BLD VENIPUNCTURE: CPT

## 2023-11-15 LAB
25(OH)D3 SERPL-MCNC: 33.5 NG/ML
CHOLEST SERPL-MCNC: 238 MG/DL
CREAT SPEC-SCNC: 152 MG/DL
ESTIMATED AVERAGE GLUCOSE: 126 MG/DL
FERRITIN SERPL-MCNC: 330 NG/ML
HBA1C MFR BLD HPLC: 6 %
HCT VFR BLD CALC: 38.7 %
HDLC SERPL-MCNC: 64 MG/DL
HGB BLD-MCNC: 11.4 G/DL
IRON SATN MFR SERPL: 32 %
IRON SERPL-MCNC: 103 UG/DL
LDLC SERPL CALC-MCNC: 155 MG/DL
MCHC RBC-ENTMCNC: 18.9 PG
MCHC RBC-ENTMCNC: 29.5 GM/DL
MCV RBC AUTO: 64.2 FL
MICROALBUMIN 24H UR DL<=1MG/L-MCNC: <1.2 MG/DL
MICROALBUMIN/CREAT 24H UR-RTO: NORMAL MG/G
NONHDLC SERPL-MCNC: 173 MG/DL
PLATELET # BLD AUTO: 394 K/UL
RBC # BLD: 6.03 M/UL
RBC # FLD: 19.2 %
T3 SERPL-MCNC: 96 NG/DL
T3FREE SERPL-MCNC: 3.15 PG/ML
T4 FREE SERPL-MCNC: 1.2 NG/DL
T4 SERPL-MCNC: 6.3 UG/DL
TIBC SERPL-MCNC: 319 UG/DL
TRIGL SERPL-MCNC: 102 MG/DL
TSH SERPL-ACNC: 2.04 UIU/ML
UIBC SERPL-MCNC: 216 UG/DL
URATE SERPL-MCNC: 5.7 MG/DL
WBC # FLD AUTO: 8.06 K/UL

## 2023-11-16 LAB — BACTERIA UR CULT: NORMAL

## 2023-11-21 ENCOUNTER — APPOINTMENT (OUTPATIENT)
Dept: RHEUMATOLOGY | Facility: CLINIC | Age: 58
End: 2023-11-21
Payer: MEDICAID

## 2023-11-21 DIAGNOSIS — J84.9 INTERSTITIAL PULMONARY DISEASE, UNSPECIFIED: ICD-10-CM

## 2023-11-21 PROCEDURE — 99214 OFFICE O/P EST MOD 30 MIN: CPT | Mod: GC

## 2023-11-22 PROBLEM — J84.9 INTERSTITIAL LUNG DISEASE: Status: ACTIVE | Noted: 2023-07-28

## 2023-12-09 ENCOUNTER — APPOINTMENT (OUTPATIENT)
Dept: FAMILY MEDICINE | Facility: CLINIC | Age: 58
End: 2023-12-09
Payer: MEDICAID

## 2023-12-09 DIAGNOSIS — D64.9 ANEMIA, UNSPECIFIED: ICD-10-CM

## 2023-12-09 PROCEDURE — 99214 OFFICE O/P EST MOD 30 MIN: CPT

## 2023-12-21 ENCOUNTER — APPOINTMENT (OUTPATIENT)
Dept: GASTROENTEROLOGY | Facility: CLINIC | Age: 58
End: 2023-12-21
Payer: MEDICAID

## 2023-12-21 VITALS
BODY MASS INDEX: 31.72 KG/M2 | SYSTOLIC BLOOD PRESSURE: 143 MMHG | OXYGEN SATURATION: 98 % | WEIGHT: 168 LBS | DIASTOLIC BLOOD PRESSURE: 80 MMHG | TEMPERATURE: 97.1 F | HEART RATE: 76 BPM | HEIGHT: 61 IN

## 2023-12-21 DIAGNOSIS — Z83.3 FAMILY HISTORY OF DIABETES MELLITUS: ICD-10-CM

## 2023-12-21 DIAGNOSIS — Z82.0 FAMILY HISTORY OF EPILEPSY AND OTHER DISEASES OF THE NERVOUS SYSTEM: ICD-10-CM

## 2023-12-21 DIAGNOSIS — Z83.49 FAMILY HISTORY OF OTHER ENDOCRINE, NUTRITIONAL AND METABOLIC DISEASES: ICD-10-CM

## 2023-12-21 DIAGNOSIS — Z12.11 ENCOUNTER FOR SCREENING FOR MALIGNANT NEOPLASM OF COLON: ICD-10-CM

## 2023-12-21 DIAGNOSIS — Z80.0 FAMILY HISTORY OF MALIGNANT NEOPLASM OF DIGESTIVE ORGANS: ICD-10-CM

## 2023-12-21 PROCEDURE — 99204 OFFICE O/P NEW MOD 45 MIN: CPT

## 2023-12-21 RX ORDER — POLYETHYLENE GLYCOL 3350, SODIUM CHLORIDE, SODIUM BICARBONATE AND POTASSIUM CHLORIDE WITH LEMON FLAVOR 420; 11.2; 5.72; 1.48 G/4L; G/4L; G/4L; G/4L
420 POWDER, FOR SOLUTION ORAL
Qty: 4000 | Refills: 0 | Status: ACTIVE | COMMUNITY
Start: 2023-12-21 | End: 1900-01-01

## 2023-12-21 NOTE — CONSULT LETTER
[Dear  ___] : Dear  [unfilled], [Consult Letter:] : I had the pleasure of evaluating your patient, [unfilled]. [Please see my note below.] : Please see my note below. [Consult Closing:] : Thank you very much for allowing me to participate in the care of this patient.  If you have any questions, please do not hesitate to contact me. [Sincerely,] : Sincerely, [FreeTextEntry3] : Salvatore Smiley MD, FACG

## 2023-12-21 NOTE — PHYSICAL EXAM
[Alert] : alert [Normal Voice/Communication] : normal voice/communication [Healthy Appearing] : healthy appearing [No Acute Distress] : no acute distress [Sclera] : the sclera and conjunctiva were normal [Hearing Threshold Finger Rub Not [place]] : hearing was normal [Normal Lips/Gums] : the lips and gums were normal [Oropharynx] : the oropharynx was normal [Normal Appearance] : the appearance of the neck was normal [No Neck Mass] : no neck mass was observed [No Respiratory Distress] : no respiratory distress [No Acc Muscle Use] : no accessory muscle use [Respiration, Rhythm And Depth] : normal respiratory rhythm and effort [Auscultation Breath Sounds / Voice Sounds] : lungs were clear to auscultation bilaterally [Heart Rate And Rhythm] : heart rate was normal and rhythm regular [Normal S1, S2] : normal S1 and S2 [Murmurs] : no murmurs [None] : no edema [Bowel Sounds] : normal bowel sounds [Abdomen Tenderness] : non-tender [No Masses] : no abdominal mass palpated [Abdomen Soft] : soft [] : no hepatosplenomegaly [Cervical Lymph Nodes Enlarged Posterior Bilaterally] : no posterior cervical lymphadenopathy [Supraclavicular Lymph Nodes Enlarged Bilaterally] : no supraclavicular lymphadenopathy [No CVA Tenderness] : no CVA  tenderness [Abnormal Walk] : normal gait [Normal] : oriented to person, place, and time [Oriented To Time, Place, And Person] : oriented to person, place, and time [Normal Affect] : the affect was normal [Normal Mood] : the mood was normal

## 2023-12-21 NOTE — ASSESSMENT
[FreeTextEntry1] : Screening colonoscopy the patient with diabetes and good syndrome.  The importance of colon cancer screening and the colonoscopy prep was discussed with the patient and her mother.  She understands and all questions were answered.  Medical clearance will be requested.

## 2023-12-21 NOTE — HISTORY OF PRESENT ILLNESS
[FreeTextEntry1] : 58-year-old diabetic woman with GOODs syndrome referred for a screening colonoscopy.  She has a family history of colon cancer in her father.  Her last colonoscopy was over 5 years ago.  She denies rectal bleeding, melena or hematemesis.  She has been treated for an anal fissure in the past.  She receives immunotherapy for her GERD's syndrome.
c/o high blood pressure x 3 days

## 2023-12-29 ENCOUNTER — RX RENEWAL (OUTPATIENT)
Age: 58
End: 2023-12-29

## 2023-12-29 RX ORDER — PREDNISONE 1 MG/1
1 TABLET ORAL
Qty: 90 | Refills: 1 | Status: ACTIVE | COMMUNITY
Start: 1900-01-01 | End: 1900-01-01

## 2024-01-18 ENCOUNTER — APPOINTMENT (OUTPATIENT)
Dept: DERMATOLOGY | Facility: CLINIC | Age: 59
End: 2024-01-18
Payer: MEDICAID

## 2024-01-18 DIAGNOSIS — L21.9 SEBORRHEIC DERMATITIS, UNSPECIFIED: ICD-10-CM

## 2024-01-18 DIAGNOSIS — L30.9 DERMATITIS, UNSPECIFIED: ICD-10-CM

## 2024-01-18 PROCEDURE — 99204 OFFICE O/P NEW MOD 45 MIN: CPT

## 2024-01-18 RX ORDER — KETOCONAZOLE 20.5 MG/ML
2 SHAMPOO, SUSPENSION TOPICAL
Qty: 1 | Refills: 6 | Status: ACTIVE | COMMUNITY
Start: 2024-01-18 | End: 1900-01-01

## 2024-01-18 RX ORDER — UREA 39 G/100G
39 CREAM TOPICAL
Qty: 1 | Refills: 5 | Status: ACTIVE | COMMUNITY
Start: 2024-01-18 | End: 1900-01-01

## 2024-01-19 RX ORDER — UREA 17 G/85G
20 CREAM TOPICAL TWICE DAILY
Qty: 1 | Refills: 0 | Status: ACTIVE | COMMUNITY
Start: 2024-01-19 | End: 1900-01-01

## 2024-02-29 ENCOUNTER — APPOINTMENT (OUTPATIENT)
Dept: PEDIATRIC ALLERGY IMMUNOLOGY | Facility: CLINIC | Age: 59
End: 2024-02-29
Payer: MEDICAID

## 2024-02-29 VITALS
BODY MASS INDEX: 32.5 KG/M2 | OXYGEN SATURATION: 97 % | HEIGHT: 61 IN | SYSTOLIC BLOOD PRESSURE: 147 MMHG | WEIGHT: 172.13 LBS | DIASTOLIC BLOOD PRESSURE: 80 MMHG | HEART RATE: 83 BPM

## 2024-02-29 DIAGNOSIS — Z99.81 DEPENDENCE ON SUPPLEMENTAL OXYGEN: ICD-10-CM

## 2024-02-29 DIAGNOSIS — R09.02 HYPOXEMIA: ICD-10-CM

## 2024-02-29 DIAGNOSIS — C37 MALIGNANT NEOPLASM OF THYMUS: ICD-10-CM

## 2024-02-29 DIAGNOSIS — D64.9 ANEMIA, UNSPECIFIED: ICD-10-CM

## 2024-02-29 DIAGNOSIS — D80.1 NONFAMILIAL HYPOGAMMAGLOBULINEMIA: ICD-10-CM

## 2024-02-29 PROCEDURE — 99215 OFFICE O/P EST HI 40 MIN: CPT

## 2024-02-29 RX ORDER — SEMAGLUTIDE 0.68 MG/ML
2 INJECTION, SOLUTION SUBCUTANEOUS
Refills: 0 | Status: ACTIVE | COMMUNITY
Start: 2024-02-29

## 2024-02-29 RX ORDER — IMMUNE GLOBULIN SUBCUTANEOUS (HUMAN) 200 MG/ML
8 INJECTION, SOLUTION SUBCUTANEOUS
Qty: 160 | Refills: 11 | Status: ACTIVE | COMMUNITY

## 2024-03-01 PROBLEM — D64.9 ANEMIA: Status: ACTIVE | Noted: 2023-11-10

## 2024-03-01 NOTE — ASSESSMENT
[FreeTextEntry1] : 58 yr old with acquired hypogammaglobulinemia on ScIgG RT doing very well, Pt with a history of SLE (currently asymptomatic) and with grade A thymoma c/b Good syndrome with absent B cells and hypogammaglobulinemia currently on immunoglobulin replacement therapy. Continue with immunoglobulin replacement therapy.   # GOOD SYNDROME - Initial evaluation: CD19 = 1, IgG 221 (L), IgA 51 (L), IgM <5 (L). CT showing thymoma which was biopsied and demonstrated grade A pathology.  - This syndrome typically presents around the 5th decade of life (median age 55-63 y/o) with thymoma, hypogammaglobulinemia, and absent B cells. The hypogammaglobulinemia puts them at high risk of otosinopulmonary infections and developing bronchiectasis which can be treated with immunoglobulin replacement These patients have significant immune dysregulation present and at high risk of autoimmunity. - Additionally, patients often have a component of cellular immunodeficiency. This places them at increased risk of fungal, viral, and opportunistic infections. It is important to maintain a high index of suspicion for less common infectious culprits. - Continue with cuvitru 16g every 2 weeks (32g per mo, 409 mg/kg per mo). As she just received her first increased dose this week, will wait to get screening labs and immunoglobulin level for 4-6 weeks - As patient just started ozempic, she will need to keep us updated on any weight loss so we can adjust immunoglobulin dose accordingly - Recommend follow-up with CT surgery regarding thymoma and if there is any indication for surgical removal  # AMOXICILLIN ALLERGY - Not addressed at this visit, but will need to evaluate her candidacy for penicillin delabeling at future visit  # HISTORY OF SLE - Previous history of SLE, but currently no active symptoms and negative serology.  - Follow up with rheumatology  # TYPE 2 DM - Insulin dependent and recently started ozempic. Her T2DM was diagnosed during her severe COVID infection - Auto-antibodies were negative during hospitalization - Follow-up with PCP - Patient to keep us updated on any changes >2kg in her weight  # MICROCYTIC ANEMIA - Stable and chronic in the setting of known thalassemia  Follow-up 4 months

## 2024-03-01 NOTE — CONSULT LETTER
[Dear  ___] : Dear  [unfilled], [Courtesy Letter:] : I had the pleasure of seeing your patient, [unfilled], in my office today. [Please see my note below.] : Please see my note below. [Consult Closing:] : Thank you very much for allowing me to participate in the care of this patient.  If you have any questions, please do not hesitate to contact me. [Sincerely,] : Sincerely, [FreeTextEntry3] : Sultan Andrea DO Fellow in Allergy/Immunology [facility] \[facility]\""/[facility] Phone: [phone] Fax: [phone]

## 2024-03-01 NOTE — HISTORY OF PRESENT ILLNESS
[de-identified] : Ms. Saldana is a 57 y/o female with a history of SLE with reportedly positive anti-dsDNA antibodies diagnosed 10 years ago previously on HCQ which she has been off of for several years, thalassemia, newly diagnosed insulin dependent diabetes,  psoriasis, and Good syndrome who presents for follow-up. Last seen 10/30/2023.   Interval history:  - Ig level previously 674 while on immunoglobulin dose of 28g/mo, was increased to 32g/mo.  - Saw rheumatology, no symptoms or serologic evidence of SLE, so will hold off on initiating treatment.  - Saw dermatology for dermatitis, felt to be likely related to seborrheic dermatitis and retention keratosis - She has been doing well with Cuvitru infusions into her abdomen. She just received the her first increased dose of 16g earlier this week. She feels some fullness in abdomen after infusion, but this resolves within 24 hours. No pain or tenderness, fevers, joint pain, or headaches associated with infusions.  - No interval otosinopulmonary infections or diarrhea - Her breathing is back to baseline. No cough or dyspnea at rest nor on exertion. Not requiring O2.  - She reports just recently starting ozempic for her diabetes and notes that her sugars have been under better control

## 2024-03-01 NOTE — PHYSICAL EXAM
[Alert] : alert [Well Nourished] : well nourished [Healthy Appearance] : healthy appearance [No Acute Distress] : no acute distress [Well Developed] : well developed [No Discharge] : no discharge [Normal Pupil & Iris Size/Symmetry] : normal pupil and iris size and symmetry [No Photophobia] : no photophobia [Sclera Not Icteric] : sclera not icteric [Normal Nasal Mucosa] : the nasal mucosa was normal [Normal Lips/Tongue] : the lips and tongue were normal [Normal Tonsils] : normal tonsils [Normal Outer Ear/Nose] : the ears and nose were normal in appearance [No Thrush] : no thrush [Pale mucosa] : pale mucosa [Supple] : the neck was supple [Normal Rate and Effort] : normal respiratory rhythm and effort [No Crackles] : no crackles [No Retractions] : no retractions [Bilateral Audible Breath Sounds] : bilateral audible breath sounds [Normal Rate] : heart rate was normal  [Normal S1, S2] : normal S1 and S2 [No murmur] : no murmur [Regular Rhythm] : with a regular rhythm [Soft] : abdomen soft [Not Tender] : non-tender [Not Distended] : not distended [No HSM] : no hepato-splenomegaly [Normal Cervical Lymph Nodes] : cervical [Skin Intact] : skin intact  [No Rash] : no rash [No Skin Lesions] : no skin lesions [No clubbing] : no clubbing [No Edema] : no edema [No Cyanosis] : no cyanosis [Normal Mood] : mood was normal [Normal Affect] : affect was normal [Alert, Awake, Oriented as Age-Appropriate] : alert, awake, oriented as age appropriate [No Motor Deficits] : the motor exam was normal [de-identified] : Cerumen obstructing L TM, R TM clear

## 2024-03-02 PROBLEM — I10 BENIGN ESSENTIAL HYPERTENSION: Status: ACTIVE | Noted: 2023-08-12

## 2024-03-02 PROBLEM — Z86.2 HISTORY OF THALASSEMIA: Status: RESOLVED | Noted: 2023-07-19 | Resolved: 2024-03-02

## 2024-03-09 ENCOUNTER — APPOINTMENT (OUTPATIENT)
Dept: FAMILY MEDICINE | Facility: CLINIC | Age: 59
End: 2024-03-09
Payer: MEDICAID

## 2024-03-09 VITALS
DIASTOLIC BLOOD PRESSURE: 85 MMHG | OXYGEN SATURATION: 98 % | WEIGHT: 170 LBS | BODY MASS INDEX: 32.1 KG/M2 | SYSTOLIC BLOOD PRESSURE: 121 MMHG | HEART RATE: 86 BPM | TEMPERATURE: 98 F | HEIGHT: 61 IN

## 2024-03-09 DIAGNOSIS — I10 ESSENTIAL (PRIMARY) HYPERTENSION: ICD-10-CM

## 2024-03-09 DIAGNOSIS — Z86.2 PERSONAL HISTORY OF DISEASES OF THE BLOOD AND BLOOD-FORMING ORGANS AND CERTAIN DISORDERS INVOLVING THE IMMUNE MECHANISM: ICD-10-CM

## 2024-03-09 DIAGNOSIS — Z23 ENCOUNTER FOR IMMUNIZATION: ICD-10-CM

## 2024-03-09 PROCEDURE — 90750 HZV VACC RECOMBINANT IM: CPT

## 2024-03-09 PROCEDURE — 99214 OFFICE O/P EST MOD 30 MIN: CPT | Mod: 25

## 2024-03-09 PROCEDURE — 90471 IMMUNIZATION ADMIN: CPT

## 2024-03-09 RX ORDER — INSULIN LISPRO 100 [IU]/ML
100 INJECTION, SOLUTION INTRAVENOUS; SUBCUTANEOUS 3 TIMES DAILY
Qty: 1 | Refills: 0 | Status: ACTIVE | COMMUNITY
Start: 2023-07-13

## 2024-03-09 RX ORDER — INSULIN GLARGINE 100 [IU]/ML
100 INJECTION, SOLUTION SUBCUTANEOUS AT BEDTIME
Qty: 1 | Refills: 0 | Status: ACTIVE | COMMUNITY
Start: 2023-07-13

## 2024-03-09 NOTE — HEALTH RISK ASSESSMENT
[0] : 2) Feeling down, depressed, or hopeless: Not at all (0) [Never] : Never [PHQ-2 Negative - No further assessment needed] : PHQ-2 Negative - No further assessment needed [[id number]] : 0

## 2024-03-09 NOTE — HISTORY OF PRESENT ILLNESS
[de-identified] : 59 years old female has past medical history of insulin dependent diabetes, good syndrome, SLE, psoriasis, hypercholesterolemia, thalassemia, vitamin D deficiency, type A thymoma, came back for follow up. Pt has been taking all her medications, needs Rxs for all medications and blood tests today. Pt had no major complaints.  Pt has been followed with rheumatologist, immunologist, dermatologist, no change of management.

## 2024-03-13 ENCOUNTER — LABORATORY RESULT (OUTPATIENT)
Age: 59
End: 2024-03-13

## 2024-03-13 ENCOUNTER — APPOINTMENT (OUTPATIENT)
Dept: FAMILY MEDICINE | Facility: CLINIC | Age: 59
End: 2024-03-13
Payer: MEDICAID

## 2024-03-13 PROCEDURE — 36415 COLL VENOUS BLD VENIPUNCTURE: CPT

## 2024-03-14 LAB
25(OH)D3 SERPL-MCNC: 53 NG/ML
ALBUMIN SERPL ELPH-MCNC: 4.7 G/DL
ALP BLD-CCNC: 91 U/L
ALT SERPL-CCNC: 12 U/L
ANION GAP SERPL CALC-SCNC: 14 MMOL/L
APPEARANCE: ABNORMAL
AST SERPL-CCNC: 19 U/L
BILIRUB SERPL-MCNC: 0.7 MG/DL
BILIRUBIN URINE: NEGATIVE
BLOOD URINE: NEGATIVE
BUN SERPL-MCNC: 13 MG/DL
CALCIUM SERPL-MCNC: 9.7 MG/DL
CHLORIDE SERPL-SCNC: 105 MMOL/L
CHOLEST SERPL-MCNC: 171 MG/DL
CO2 SERPL-SCNC: 24 MMOL/L
COLOR: NORMAL
CREAT SERPL-MCNC: 0.68 MG/DL
CREAT SPEC-SCNC: 305 MG/DL
EGFR: 100 ML/MIN/1.73M2
ESTIMATED AVERAGE GLUCOSE: 134 MG/DL
FERRITIN SERPL-MCNC: 413 NG/ML
GLUCOSE QUALITATIVE U: NEGATIVE MG/DL
GLUCOSE SERPL-MCNC: 114 MG/DL
HBA1C MFR BLD HPLC: 6.3 %
HCT VFR BLD CALC: 40.4 %
HDLC SERPL-MCNC: 55 MG/DL
HGB BLD-MCNC: 11.9 G/DL
IRON SATN MFR SERPL: 34 %
IRON SERPL-MCNC: 106 UG/DL
KETONES URINE: ABNORMAL MG/DL
LDLC SERPL CALC-MCNC: 97 MG/DL
LEUKOCYTE ESTERASE URINE: ABNORMAL
MCHC RBC-ENTMCNC: 18.4 PG
MCHC RBC-ENTMCNC: 29.5 GM/DL
MCV RBC AUTO: 62.4 FL
MICROALBUMIN 24H UR DL<=1MG/L-MCNC: 2.6 MG/DL
MICROALBUMIN/CREAT 24H UR-RTO: 9 MG/G
NITRITE URINE: NEGATIVE
NONHDLC SERPL-MCNC: 116 MG/DL
PH URINE: 5.5
PLATELET # BLD AUTO: 415 K/UL
POTASSIUM SERPL-SCNC: 3.9 MMOL/L
PROT SERPL-MCNC: 6.8 G/DL
PROTEIN URINE: NORMAL MG/DL
RBC # BLD: 6.47 M/UL
RBC # FLD: 19.8 %
SODIUM SERPL-SCNC: 143 MMOL/L
SPECIFIC GRAVITY URINE: 1.02
T3 SERPL-MCNC: 103 NG/DL
T3FREE SERPL-MCNC: 3.32 PG/ML
T4 FREE SERPL-MCNC: 1.5 NG/DL
T4 SERPL-MCNC: 8 UG/DL
TIBC SERPL-MCNC: 313 UG/DL
TRIGL SERPL-MCNC: 107 MG/DL
TSH SERPL-ACNC: 2.24 UIU/ML
UIBC SERPL-MCNC: 207 UG/DL
URATE SERPL-MCNC: 5.4 MG/DL
UROBILINOGEN URINE: 0.2 MG/DL
WBC # FLD AUTO: 8.47 K/UL

## 2024-03-20 PROBLEM — R82.90 ABNORMAL URINE: Status: ACTIVE | Noted: 2023-11-10

## 2024-03-20 PROBLEM — E78.00 HYPERCHOLESTEROLEMIA: Status: ACTIVE | Noted: 2023-08-12

## 2024-03-20 PROBLEM — D56.9 THALASSEMIA: Status: ACTIVE | Noted: 2024-03-02

## 2024-03-20 PROBLEM — E55.9 VITAMIN D DEFICIENCY: Status: ACTIVE | Noted: 2023-08-12

## 2024-03-20 PROBLEM — E11.9 DIABETES MELLITUS, TYPE 2: Status: ACTIVE | Noted: 2023-08-12

## 2024-03-27 ENCOUNTER — APPOINTMENT (OUTPATIENT)
Dept: FAMILY MEDICINE | Facility: CLINIC | Age: 59
End: 2024-03-27
Payer: MEDICAID

## 2024-03-27 VITALS
HEART RATE: 86 BPM | HEIGHT: 61 IN | BODY MASS INDEX: 32.66 KG/M2 | OXYGEN SATURATION: 97 % | DIASTOLIC BLOOD PRESSURE: 90 MMHG | SYSTOLIC BLOOD PRESSURE: 139 MMHG | TEMPERATURE: 98 F | RESPIRATION RATE: 16 BRPM | WEIGHT: 173 LBS

## 2024-03-27 DIAGNOSIS — R82.90 UNSPECIFIED ABNORMAL FINDINGS IN URINE: ICD-10-CM

## 2024-03-27 DIAGNOSIS — E55.9 VITAMIN D DEFICIENCY, UNSPECIFIED: ICD-10-CM

## 2024-03-27 DIAGNOSIS — D56.9 THALASSEMIA, UNSPECIFIED: ICD-10-CM

## 2024-03-27 DIAGNOSIS — E78.00 PURE HYPERCHOLESTEROLEMIA, UNSPECIFIED: ICD-10-CM

## 2024-03-27 DIAGNOSIS — E11.9 TYPE 2 DIABETES MELLITUS W/OUT COMPLICATIONS: ICD-10-CM

## 2024-03-27 PROCEDURE — 99214 OFFICE O/P EST MOD 30 MIN: CPT

## 2024-03-27 PROCEDURE — G2211 COMPLEX E/M VISIT ADD ON: CPT | Mod: NC,1L

## 2024-03-27 NOTE — HISTORY OF PRESENT ILLNESS
[de-identified] : RBC was 6.47, MCV 62.4, RDW 19.8, platelets 415, ferritin 413, fasting glucose 114, HBA1c 6.3, UA positive for WBC 15/HPF, calcium oxalate present, small leukocyte esterase. Reports were reviewed with pt in details. Other blood tests came back wnl. Pt has been taking all er medications.

## 2024-05-09 ENCOUNTER — RX RENEWAL (OUTPATIENT)
Age: 59
End: 2024-05-09

## 2024-05-21 ENCOUNTER — LABORATORY RESULT (OUTPATIENT)
Age: 59
End: 2024-05-21

## 2024-05-21 ENCOUNTER — APPOINTMENT (OUTPATIENT)
Dept: RHEUMATOLOGY | Facility: CLINIC | Age: 59
End: 2024-05-21
Payer: MEDICAID

## 2024-05-21 VITALS
WEIGHT: 173 LBS | BODY MASS INDEX: 32.66 KG/M2 | OXYGEN SATURATION: 97 % | HEART RATE: 76 BPM | DIASTOLIC BLOOD PRESSURE: 84 MMHG | SYSTOLIC BLOOD PRESSURE: 135 MMHG | HEIGHT: 61 IN | RESPIRATION RATE: 16 BRPM

## 2024-05-21 DIAGNOSIS — D83.8: ICD-10-CM

## 2024-05-21 DIAGNOSIS — M32.9 SYSTEMIC LUPUS ERYTHEMATOSUS, UNSPECIFIED: ICD-10-CM

## 2024-05-21 DIAGNOSIS — Z87.2 PERSONAL HISTORY OF DISEASES OF THE SKIN AND SUBCUTANEOUS TISSUE: ICD-10-CM

## 2024-05-21 DIAGNOSIS — M32.19 OTHER ORGAN OR SYSTEM INVOLVEMENT IN SYSTEMIC LUPUS ERYTHEMATOSUS: ICD-10-CM

## 2024-05-21 PROCEDURE — 99214 OFFICE O/P EST MOD 30 MIN: CPT

## 2024-05-21 PROCEDURE — G2211 COMPLEX E/M VISIT ADD ON: CPT | Mod: NC,1L

## 2024-05-22 LAB
ALBUMIN SERPL ELPH-MCNC: 4.5 G/DL
ALP BLD-CCNC: 97 U/L
ALT SERPL-CCNC: 13 U/L
ANION GAP SERPL CALC-SCNC: 13 MMOL/L
APPEARANCE: CLEAR
AST SERPL-CCNC: 17 U/L
BACTERIA: NEGATIVE /HPF
BASOPHILS # BLD AUTO: 0.05 K/UL
BASOPHILS NFR BLD AUTO: 0.5 %
BILIRUB SERPL-MCNC: 0.6 MG/DL
BILIRUBIN URINE: NEGATIVE
BLOOD URINE: NEGATIVE
BUN SERPL-MCNC: 17 MG/DL
CALCIUM SERPL-MCNC: 9.4 MG/DL
CAST: 0 /LPF
CHLORIDE SERPL-SCNC: 104 MMOL/L
CO2 SERPL-SCNC: 23 MMOL/L
COLOR: YELLOW
CREAT SERPL-MCNC: 0.66 MG/DL
CREAT SPEC-SCNC: 55 MG/DL
CREAT/PROT UR: 0.1 RATIO
DEPRECATED KAPPA LC FREE/LAMBDA SER: 0.83 RATIO
DSDNA AB SER-ACNC: <1 IU/ML
EGFR: 101 ML/MIN/1.73M2
ENA RNP AB SER IA-ACNC: <0.2 AL
ENA SM AB SER IA-ACNC: <0.2 AL
ENA SS-A AB SER IA-ACNC: 0.5 AL
ENA SS-B AB SER IA-ACNC: <0.2 AL
EOSINOPHIL # BLD AUTO: 0.15 K/UL
EOSINOPHIL NFR BLD AUTO: 1.5 %
EPITHELIAL CELLS: 1 /HPF
GLUCOSE QUALITATIVE U: NEGATIVE MG/DL
GLUCOSE SERPL-MCNC: 127 MG/DL
HCT VFR BLD CALC: 39.3 %
HGB BLD-MCNC: 11.7 G/DL
IGA SER QL IEP: 60 MG/DL
IGG SER QL IEP: 635 MG/DL
IGM SER QL IEP: <10 MG/DL
IMM GRANULOCYTES NFR BLD AUTO: 0.3 %
KAPPA LC CSF-MCNC: 0.54 MG/DL
KAPPA LC SERPL-MCNC: 0.45 MG/DL
KETONES URINE: NEGATIVE MG/DL
LEUKOCYTE ESTERASE URINE: ABNORMAL
LYMPHOCYTES # BLD AUTO: 2.81 K/UL
LYMPHOCYTES NFR BLD AUTO: 28.1 %
MAN DIFF?: NORMAL
MCHC RBC-ENTMCNC: 18.6 PG
MCHC RBC-ENTMCNC: 29.8 GM/DL
MCV RBC AUTO: 62.5 FL
MICROSCOPIC-UA: NORMAL
MONOCYTES # BLD AUTO: 0.66 K/UL
MONOCYTES NFR BLD AUTO: 6.6 %
NEUTROPHILS # BLD AUTO: 6.3 K/UL
NEUTROPHILS NFR BLD AUTO: 63 %
NITRITE URINE: NEGATIVE
PH URINE: 6
PLATELET # BLD AUTO: 389 K/UL
POTASSIUM SERPL-SCNC: 4.1 MMOL/L
PROT SERPL-MCNC: 6.6 G/DL
PROT UR-MCNC: 4 MG/DL
PROTEIN URINE: NEGATIVE MG/DL
RBC # BLD: 6.29 M/UL
RBC # FLD: 19.1 %
RED BLOOD CELLS URINE: 0 /HPF
SODIUM SERPL-SCNC: 140 MMOL/L
SPECIFIC GRAVITY URINE: 1.01
UROBILINOGEN URINE: 0.2 MG/DL
WBC # FLD AUTO: 10 K/UL
WHITE BLOOD CELLS URINE: 1 /HPF

## 2024-05-22 NOTE — PHYSICAL EXAM
[General Appearance - Alert] : alert [General Appearance - In No Acute Distress] : in no acute distress [Sclera] : the sclera and conjunctiva were normal [Examination Of The Oral Cavity] : the lips and gums were normal [Oropharynx] : the oropharynx was normal [FreeTextEntry1] : No oral ulcers  [Neck Appearance] : the appearance of the neck was normal [Respiration, Rhythm And Depth] : normal respiratory rhythm and effort [Auscultation Breath Sounds / Voice Sounds] : lungs were clear to auscultation bilaterally [Heart Rate And Rhythm] : heart rate was normal and rhythm regular [Heart Sounds] : normal S1 and S2 [Edema] : there was no peripheral edema [Abdomen Soft] : soft [Abdomen Tenderness] : non-tender [] : no rash [No Focal Deficits] : no focal deficits [Oriented To Time, Place, And Person] : oriented to person, place, and time [Musculoskeletal - Swelling] : no joint swelling seen

## 2024-05-22 NOTE — HISTORY OF PRESENT ILLNESS
[de-identified] : Last was seen in November, 2023 [FreeTextEntry1] : Interval History : ----------------------- off prednisone since 11/2024.  Denies SOB, cough, CP, joint pain, rashes, alopecia, myalgias, fevers  no infections

## 2024-05-22 NOTE — DATA REVIEWED
[FreeTextEntry1] : CT Chest (10/2023)  "Findings consistent with sequela of prior COVID pneumonitis. Short-term interval stability of previously biopsied large thymoma within the anterior mediastinum."

## 2024-05-22 NOTE — ASSESSMENT
[FreeTextEntry1] : Good syndrome (thymoma, hypogammaglobulinemia, B cells absence) No clinical or serological features of systemic autoimmune disease, repeat serology- negative for SLE Tapered off prednisone - labs, CT Chest, and PFTs reviewed  Plan: - sq Ig supplementation with AI- 4gms q2 weeks - continue off prendisone -repeated serology for SLE - negative  RTO as needed/ yearly

## 2024-05-24 LAB — ANA SER IF-ACNC: NEGATIVE

## 2024-06-07 RX ORDER — ATORVASTATIN CALCIUM 10 MG/1
10 TABLET, FILM COATED ORAL DAILY
Qty: 90 | Refills: 1 | Status: ACTIVE | COMMUNITY
Start: 2023-12-09 | End: 1900-01-01

## 2024-06-07 RX ORDER — ERGOCALCIFEROL 1.25 MG/1
1.25 MG CAPSULE, LIQUID FILLED ORAL
Qty: 13 | Refills: 1 | Status: ACTIVE | COMMUNITY
Start: 2023-08-12 | End: 1900-01-01

## 2024-06-07 RX ORDER — FOLIC ACID 1 MG/1
1 TABLET ORAL DAILY
Qty: 90 | Refills: 1 | Status: ACTIVE | COMMUNITY
Start: 1900-01-01 | End: 1900-01-01

## 2024-07-08 DIAGNOSIS — Z12.11 ENCOUNTER FOR SCREENING FOR MALIGNANT NEOPLASM OF COLON: ICD-10-CM

## 2024-07-16 ENCOUNTER — APPOINTMENT (OUTPATIENT)
Dept: DERMATOLOGY | Facility: CLINIC | Age: 59
End: 2024-07-16
Payer: MEDICAID

## 2024-07-16 DIAGNOSIS — L21.9 SEBORRHEIC DERMATITIS, UNSPECIFIED: ICD-10-CM

## 2024-07-16 DIAGNOSIS — L30.9 DERMATITIS, UNSPECIFIED: ICD-10-CM

## 2024-07-16 PROCEDURE — 99214 OFFICE O/P EST MOD 30 MIN: CPT

## 2024-07-20 PROBLEM — Z12.11 COLON CANCER SCREENING: Status: ACTIVE | Noted: 2024-07-08

## 2024-07-25 ENCOUNTER — APPOINTMENT (OUTPATIENT)
Dept: PEDIATRIC ALLERGY IMMUNOLOGY | Facility: CLINIC | Age: 59
End: 2024-07-25
Payer: MEDICAID

## 2024-07-25 VITALS
WEIGHT: 169 LBS | OXYGEN SATURATION: 97 % | HEART RATE: 83 BPM | DIASTOLIC BLOOD PRESSURE: 77 MMHG | BODY MASS INDEX: 31.91 KG/M2 | HEIGHT: 61 IN | SYSTOLIC BLOOD PRESSURE: 143 MMHG

## 2024-07-25 DIAGNOSIS — C37 MALIGNANT NEOPLASM OF THYMUS: ICD-10-CM

## 2024-07-25 DIAGNOSIS — D83.8: ICD-10-CM

## 2024-07-25 DIAGNOSIS — L27.0 GENERALIZED SKIN ERUPTION DUE TO DRUGS AND MEDICAMENTS TAKEN INTERNALLY: ICD-10-CM

## 2024-07-25 DIAGNOSIS — T36.0X5A GENERALIZED SKIN ERUPTION DUE TO DRUGS AND MEDICAMENTS TAKEN INTERNALLY: ICD-10-CM

## 2024-07-25 DIAGNOSIS — D64.9 ANEMIA, UNSPECIFIED: ICD-10-CM

## 2024-07-25 DIAGNOSIS — D80.1 NONFAMILIAL HYPOGAMMAGLOBULINEMIA: ICD-10-CM

## 2024-07-25 PROCEDURE — G2211 COMPLEX E/M VISIT ADD ON: CPT | Mod: NC,1L

## 2024-07-25 PROCEDURE — 99215 OFFICE O/P EST HI 40 MIN: CPT

## 2024-07-26 ENCOUNTER — NON-APPOINTMENT (OUTPATIENT)
Age: 59
End: 2024-07-26

## 2024-07-26 ENCOUNTER — LABORATORY RESULT (OUTPATIENT)
Age: 59
End: 2024-07-26

## 2024-07-26 ENCOUNTER — APPOINTMENT (OUTPATIENT)
Dept: FAMILY MEDICINE | Facility: CLINIC | Age: 59
End: 2024-07-26
Payer: MEDICAID

## 2024-07-26 VITALS
DIASTOLIC BLOOD PRESSURE: 86 MMHG | HEART RATE: 87 BPM | HEIGHT: 61 IN | SYSTOLIC BLOOD PRESSURE: 125 MMHG | WEIGHT: 167 LBS | OXYGEN SATURATION: 98 % | BODY MASS INDEX: 31.53 KG/M2 | TEMPERATURE: 97.8 F

## 2024-07-26 DIAGNOSIS — Z12.11 ENCOUNTER FOR SCREENING FOR MALIGNANT NEOPLASM OF COLON: ICD-10-CM

## 2024-07-26 DIAGNOSIS — Z00.01 ENCOUNTER FOR GENERAL ADULT MEDICAL EXAMINATION WITH ABNORMAL FINDINGS: ICD-10-CM

## 2024-07-26 DIAGNOSIS — I10 ESSENTIAL (PRIMARY) HYPERTENSION: ICD-10-CM

## 2024-07-26 DIAGNOSIS — Z12.39 ENCOUNTER FOR OTHER SCREENING FOR MALIGNANT NEOPLASM OF BREAST: ICD-10-CM

## 2024-07-26 PROCEDURE — 99396 PREV VISIT EST AGE 40-64: CPT

## 2024-07-26 PROCEDURE — 93000 ELECTROCARDIOGRAM COMPLETE: CPT

## 2024-07-26 PROCEDURE — 36415 COLL VENOUS BLD VENIPUNCTURE: CPT

## 2024-07-26 NOTE — HEALTH RISK ASSESSMENT
[Good] : ~his/her~  mood as  good [No] : In the past 12 months have you used drugs other than those required for medical reasons? No [0] : 2) Feeling down, depressed, or hopeless: Not at all (0) [PHQ-2 Negative - No further assessment needed] : PHQ-2 Negative - No further assessment needed [Never] : Never [No Retinopathy] : No retinopathy [Patient declined mammogram] : Patient declined mammogram [Patient declined PAP Smear] : Patient declined PAP Smear [Patient declined colonoscopy] : Patient declined colonoscopy [With Family] : lives with family [Unemployed] : unemployed [Single] : single [Fully functional (bathing, dressing, toileting, transferring, walking, feeding)] : Fully functional (bathing, dressing, toileting, transferring, walking, feeding) [Fully functional (using the telephone, shopping, preparing meals, housekeeping, doing laundry, using] : Fully functional and needs no help or supervision to perform IADLs (using the telephone, shopping, preparing meals, housekeeping, doing laundry, using transportation, managing medications and managing finances) [Designated Healthcare Proxy] : Designated healthcare proxy [Relationship: ___] : Relationship: [unfilled] [[id number]] : 0 [EyeExamDate] : 7/24 [AdvancecareDate] : 7/26/24

## 2024-07-26 NOTE — HISTORY OF PRESENT ILLNESS
[de-identified] : 59 years old female has past medical history of hypertension, diabetes, hypercholesterolemia, interstitial lung disease, hypogammaglobulinemia, good syndrome, type A thymoma, and vitamin D deficiency, came back for annual physical exam.

## 2024-07-26 NOTE — HISTORY OF PRESENT ILLNESS
[de-identified] : 59 years old female has past medical history of hypertension, diabetes, hypercholesterolemia, interstitial lung disease, hypogammaglobulinemia, good syndrome, type A thymoma, and vitamin D deficiency, came back for annual physical exam.

## 2024-07-26 NOTE — HEALTH RISK ASSESSMENT
[Good] : ~his/her~  mood as  good [No] : In the past 12 months have you used drugs other than those required for medical reasons? No [0] : 2) Feeling down, depressed, or hopeless: Not at all (0) [PHQ-2 Negative - No further assessment needed] : PHQ-2 Negative - No further assessment needed [Never] : Never [No Retinopathy] : No retinopathy [Patient declined mammogram] : Patient declined mammogram [Patient declined PAP Smear] : Patient declined PAP Smear [Patient declined colonoscopy] : Patient declined colonoscopy [With Family] : lives with family [Unemployed] : unemployed [Single] : single [Fully functional (bathing, dressing, toileting, transferring, walking, feeding)] : Fully functional (bathing, dressing, toileting, transferring, walking, feeding) [Fully functional (using the telephone, shopping, preparing meals, housekeeping, doing laundry, using] : Fully functional and needs no help or supervision to perform IADLs (using the telephone, shopping, preparing meals, housekeeping, doing laundry, using transportation, managing medications and managing finances) [Designated Healthcare Proxy] : Designated healthcare proxy [Relationship: ___] : Relationship: [unfilled] [[id number]] : 0 [EyeExamDate] : 7/24 [AdvancecareDate] : 7/26/24

## 2024-07-27 NOTE — PHYSICAL EXAM
[Alert] : alert [Well Nourished] : well nourished [Healthy Appearance] : healthy appearance [No Acute Distress] : no acute distress [Well Developed] : well developed [Normal Pupil & Iris Size/Symmetry] : normal pupil and iris size and symmetry [No Discharge] : no discharge [No Photophobia] : no photophobia [Sclera Not Icteric] : sclera not icteric [Normal Nasal Mucosa] : the nasal mucosa was normal [Normal Lips/Tongue] : the lips and tongue were normal [Normal Outer Ear/Nose] : the ears and nose were normal in appearance [Normal Tonsils] : normal tonsils [No Thrush] : no thrush [Pale mucosa] : pale mucosa [Supple] : the neck was supple [Normal Rate and Effort] : normal respiratory rhythm and effort [No Crackles] : no crackles [No Retractions] : no retractions [Bilateral Audible Breath Sounds] : bilateral audible breath sounds [Normal Rate] : heart rate was normal  [Normal S1, S2] : normal S1 and S2 [No murmur] : no murmur [Regular Rhythm] : with a regular rhythm [Soft] : abdomen soft [Not Tender] : non-tender [Not Distended] : not distended [No HSM] : no hepato-splenomegaly [Normal Cervical Lymph Nodes] : cervical [Skin Intact] : skin intact  [No Rash] : no rash [No Skin Lesions] : no skin lesions [No clubbing] : no clubbing [No Edema] : no edema [No Cyanosis] : no cyanosis [Normal Mood] : mood was normal [Normal Affect] : affect was normal [Alert, Awake, Oriented as Age-Appropriate] : alert, awake, oriented as age appropriate [Conjunctival Erythema] : no conjunctival erythema [Posterior Pharyngeal Cobblestoning] : no posterior pharyngeal cobblestoning [Wheezing] : no wheezing was heard [No Motor Deficits] : the motor exam was normal

## 2024-07-27 NOTE — HISTORY OF PRESENT ILLNESS
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 Last seen Feb. 2024.   #HYPOGAMMAGLOBULINEMIA  On Cuvitru 16g q2 week (32g per mo, ~417 mg/kg per mo). Increased from 28g/month (365mg/kg/month) in Feb. 2024. No interval infections or hospitalizations Always dies 2 infusions a month, but last few weeks had been infusing every 2-2.5 weeks Infusing in abdomen, takes about 2.5 hours for infusion, infuses with 2 needles. Has some skin swelling in area of infusion the next day  # AMOXICILLIN ALLERGY 10-12 years ago, developed warm/blotchy skin on chest starting one hour after taking amoxicillin. No trouble breathing, vomiting, diarrhea. Was being treated for very bad cold she thinks but does not really remember. Thinks it happened the first day of abx treatment but again, does not really remember.   # HISTORY OF SLE - Previous history of SLE, but currently no active symptoms and negative serology. - Follow up with rheumatology, not on tx -Does have typical body aches but no joint pains, swelling. At initial diagnosis could barely walk & had trouble breathing because of fluid in lungs

## 2024-07-27 NOTE — HISTORY OF PRESENT ILLNESS
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 Last seen Feb. 2024.   #HYPOGAMMAGLOBULINEMIA  On Cuvitru 16g q2 week (32g per mo, ~417 mg/kg per mo). Increased from 28g/month (365mg/kg/month) in Feb. 2024. No interval infections or hospitalizations Always dies 2 infusions a month, but last few weeks had been infusing every 2-2.5 weeks Infusing in abdomen, takes about 2.5 hours for infusion, infuses with 2 needles. Has some skin swelling in area of infusion the next day  # AMOXICILLIN ALLERGY 10-12 years ago, developed warm/blotchy skin on chest starting one hour after taking amoxicillin. No trouble breathing, vomiting, diarrhea. Was being treated for very bad cold she thinks but does not really remember. Thinks it happened the first day of abx treatment but again, does not really remember.   # HISTORY OF SLE - Previous history of SLE, but currently no active symptoms and negative serology. - Follow up with rheumatology, not on tx -Does have typical body aches but no joint pains, swelling. At initial diagnosis could barely walk & had trouble breathing because of fluid in lungs

## 2024-07-27 NOTE — REASON FOR VISIT
[Routine Follow-Up] : a routine follow-up visit for [FreeTextEntry2] : Good syndrome [FreeTextEntry3] : Good Syndrome

## 2024-07-27 NOTE — REVIEW OF SYSTEMS
[Nl] : Genitourinary [FreeTextEntry9] : see HPI [de-identified] : see HPI [de-identified] : SEE HPI [de-identified] : SEE HPI

## 2024-08-03 ENCOUNTER — APPOINTMENT (OUTPATIENT)
Dept: FAMILY MEDICINE | Facility: CLINIC | Age: 59
End: 2024-08-03
Payer: MEDICAID

## 2024-08-03 DIAGNOSIS — R82.90 UNSPECIFIED ABNORMAL FINDINGS IN URINE: ICD-10-CM

## 2024-08-03 DIAGNOSIS — E55.9 VITAMIN D DEFICIENCY, UNSPECIFIED: ICD-10-CM

## 2024-08-03 DIAGNOSIS — E78.00 PURE HYPERCHOLESTEROLEMIA, UNSPECIFIED: ICD-10-CM

## 2024-08-03 DIAGNOSIS — E11.9 TYPE 2 DIABETES MELLITUS W/OUT COMPLICATIONS: ICD-10-CM

## 2024-08-03 DIAGNOSIS — D56.9 THALASSEMIA, UNSPECIFIED: ICD-10-CM

## 2024-08-03 PROCEDURE — 99441: CPT

## 2024-08-03 NOTE — HISTORY OF PRESENT ILLNESS
[Home] : at home, [unfilled] , at the time of the visit. [Medical Office: ([facility])___] : at the medical office located in  [Verbal consent obtained from patient] : the patient, [unfilled] [de-identified] : 59 years old female has past medical history of hypertension, diabetes, hypercholesterolemia, interstitial lung disease, hypogammaglobulinemia, good syndrome, type A thymoma, and vitamin D deficiency, called back to review her most recent test results. RBC was 6.09, HGB 11.3, HCT 37.9, HBA1c 6.0, HDL 46, and UA positive for leukocyte esterase. Reports were reviewed with pt in details. Other blood tests came back wnl.

## 2024-08-06 NOTE — PATIENT PROFILE ADULT - DEAF OR HARD OF HEARING?
What Type Of Note Output Would You Prefer (Optional)?: Standard Output no Hpi Title: Evaluation of Skin Lesions How Severe Are Your Spot(S)?: mild Have Your Spot(S) Been Treated In The Past?: has not been treated Location: L mid frontal scalp, r superior upper back Year Removed: 03/2013 and 09/2018

## 2024-08-22 ENCOUNTER — APPOINTMENT (OUTPATIENT)
Dept: PEDIATRIC ALLERGY IMMUNOLOGY | Facility: CLINIC | Age: 59
End: 2024-08-22
Payer: MEDICAID

## 2024-08-22 DIAGNOSIS — L27.0 GENERALIZED SKIN ERUPTION DUE TO DRUGS AND MEDICAMENTS TAKEN INTERNALLY: ICD-10-CM

## 2024-08-22 DIAGNOSIS — Z88.0 ALLERGY STATUS TO PENICILLIN: ICD-10-CM

## 2024-08-22 PROCEDURE — 99213 OFFICE O/P EST LOW 20 MIN: CPT

## 2024-08-22 PROCEDURE — G2211 COMPLEX E/M VISIT ADD ON: CPT | Mod: NC,1L

## 2024-08-22 NOTE — ASSESSMENT
[FreeTextEntry1] : 59 year old female with SLE with reportedly positive anti-dsDNA antibodies diagnosed 10 years ago previously on HCQ which she has been off of for several years, thalassemia,  insulin dependent diabetes, psoriasis, and Good syndrome, [facility] penicillin/ amoxicillin allergy. = on immunoglobulin replacement therapy (IGRT) with Cuvitru 16g q 2 weeks  Remote history of PENICILLIN  ALLERGY: Recommended further evaluation for penicillin sensitivity. We discussed options of penicillin skin testing, followed by physician-supervised challenge vs. going directly to amoxicillin challenge. Skin testing is predictive only for immediate, IgE-mediated allergic reactions. Skin test has not been found to be helpful for remote delayed mild  rashes.  We discussed that many patients labeled with penicillin allergy are, in fact, not allergic to penicillin, as well as  importance of proactively delabeling or confirming penicillin allergy.  Diagnosis of penicillin allergy leads to use of alternative, broader spectrum antibiotics and was demonstrated to cause higher rate of complications and morbidity.  We discussed that patient's  history is consistent with low-risk for hypersensitivity reaction to penicillin - Patient agreed to go directly to amoxicillin challenge.  Indications, alternatives and potential side effects were discussed.  - Will schedule amoxicillin challenge.

## 2024-08-22 NOTE — CONSULT LETTER
[Dear  ___] : Dear  [unfilled], [Consult Letter:] : I had the pleasure of evaluating your patient, [unfilled]. [Please see my note below.] : Please see my note below. [Consult Closing:] : Thank you very much for allowing me to participate in the care of this patient.  If you have any questions, please do not hesitate to contact me. [Sincerely,] : Sincerely, [FreeTextEntry3] : MD CHELSIE Andrade, LUCAS Adult [facility] Pediatric Allergy, Asthma and Clinical Immunology Associate Professor of Medicine and Pediatrics at   [facility], Adult Allergy and Immunology   [facility]   Division of Allergy, Asthma and Immunology   [address] [place] [address], [place] [address]   Phone [phone]  Fax [phone]    [DrPing  ___] : Dr. MORTON

## 2024-08-22 NOTE — HISTORY OF PRESENT ILLNESS
[de-identified] : OSMAN LOPEZ is a 59 year old female with SLE with reportedly positive anti-dsDNA antibodies diagnosed 10 years ago previously on HCQ which she has been off of for several years, thalassemia, newly diagnosed insulin dependent diabetes, psoriasis, and Good syndrome, [facility] evaluation of  penicillin/ amoxicillin  allergy. = on immunoglobulin replacement therapy (IGRT) with Cuvitru 16g q 2 weeks  History of reaction to penicillin antibiotic: She reports history of reaction to penicillin antibiotic - ~ 15 years ago- she was taking amoxicillin- she developed spotty rash that day. There were no associated shortness of breath or other allergic symptoms.  No further details of that reaction are available, except that no hospital admission was required. She has been avoiding penicillin antibiotics since. - she thinks that  she took and tolerated Cephalosporins

## 2024-12-14 ENCOUNTER — RX RENEWAL (OUTPATIENT)
Age: 59
End: 2024-12-14

## 2025-01-15 RX ORDER — EPINEPHRINE 0.3 MG/.3ML
0.3 INJECTION INTRAMUSCULAR
Qty: 1 | Refills: 3 | Status: ACTIVE | COMMUNITY
Start: 2025-01-15 | End: 1900-01-01

## 2025-04-16 ENCOUNTER — APPOINTMENT (OUTPATIENT)
Dept: FAMILY MEDICINE | Facility: CLINIC | Age: 60
End: 2025-04-16

## 2025-04-16 PROBLEM — Z87.898 HISTORY OF DIARRHEA: Status: RESOLVED | Noted: 2023-06-15 | Resolved: 2025-04-16

## 2025-04-16 PROBLEM — Z87.01 HISTORY OF PNEUMONIA: Status: RESOLVED | Noted: 2023-06-02 | Resolved: 2025-04-16

## 2025-04-23 ENCOUNTER — LABORATORY RESULT (OUTPATIENT)
Age: 60
End: 2025-04-23

## 2025-04-23 ENCOUNTER — APPOINTMENT (OUTPATIENT)
Dept: FAMILY MEDICINE | Facility: CLINIC | Age: 60
End: 2025-04-23
Payer: MEDICAID

## 2025-04-23 VITALS
TEMPERATURE: 97.2 F | WEIGHT: 168 LBS | HEIGHT: 61 IN | OXYGEN SATURATION: 97 % | BODY MASS INDEX: 31.72 KG/M2 | SYSTOLIC BLOOD PRESSURE: 125 MMHG | DIASTOLIC BLOOD PRESSURE: 86 MMHG | HEART RATE: 101 BPM

## 2025-04-23 DIAGNOSIS — Z87.898 PERSONAL HISTORY OF OTHER SPECIFIED CONDITIONS: ICD-10-CM

## 2025-04-23 DIAGNOSIS — D56.9 THALASSEMIA, UNSPECIFIED: ICD-10-CM

## 2025-04-23 DIAGNOSIS — I10 ESSENTIAL (PRIMARY) HYPERTENSION: ICD-10-CM

## 2025-04-23 DIAGNOSIS — J84.9 INTERSTITIAL PULMONARY DISEASE, UNSPECIFIED: ICD-10-CM

## 2025-04-23 DIAGNOSIS — Z87.01 PERSONAL HISTORY OF PNEUMONIA (RECURRENT): ICD-10-CM

## 2025-04-23 DIAGNOSIS — J06.9 ACUTE UPPER RESPIRATORY INFECTION, UNSPECIFIED: ICD-10-CM

## 2025-04-23 DIAGNOSIS — E78.00 PURE HYPERCHOLESTEROLEMIA, UNSPECIFIED: ICD-10-CM

## 2025-04-23 PROCEDURE — 36415 COLL VENOUS BLD VENIPUNCTURE: CPT

## 2025-04-23 PROCEDURE — G2211 COMPLEX E/M VISIT ADD ON: CPT | Mod: NC

## 2025-04-23 PROCEDURE — 99214 OFFICE O/P EST MOD 30 MIN: CPT

## 2025-04-23 RX ORDER — AZITHROMYCIN 250 MG/1
250 TABLET, FILM COATED ORAL
Qty: 1 | Refills: 0 | Status: ACTIVE | COMMUNITY
Start: 2025-04-23 | End: 1900-01-01

## 2025-04-23 RX ORDER — BENZONATATE 100 MG/1
100 CAPSULE ORAL 3 TIMES DAILY
Qty: 30 | Refills: 0 | Status: ACTIVE | COMMUNITY
Start: 2025-04-23 | End: 1900-01-01

## 2025-04-24 PROBLEM — E78.2 MIXED HYPERLIPIDEMIA: Status: ACTIVE | Noted: 2025-04-24

## 2025-04-24 PROBLEM — R71.8 ELEVATED RED BLOOD CELL COUNT: Status: ACTIVE | Noted: 2025-04-24

## 2025-04-24 LAB
25(OH)D3 SERPL-MCNC: 20.9 NG/ML
ALBUMIN SERPL ELPH-MCNC: 4.6 G/DL
ALP BLD-CCNC: 106 U/L
ALT SERPL-CCNC: 16 U/L
ANION GAP SERPL CALC-SCNC: 15 MMOL/L
APPEARANCE: CLEAR
AST SERPL-CCNC: 19 U/L
BASOPHILS # BLD AUTO: 0.07 K/UL
BASOPHILS NFR BLD AUTO: 0.7 %
BILIRUB SERPL-MCNC: 0.7 MG/DL
BILIRUBIN URINE: NEGATIVE
BLOOD URINE: NEGATIVE
BUN SERPL-MCNC: 14 MG/DL
CALCIUM SERPL-MCNC: 9.6 MG/DL
CHLORIDE SERPL-SCNC: 103 MMOL/L
CHOLEST SERPL-MCNC: 175 MG/DL
CO2 SERPL-SCNC: 24 MMOL/L
COLOR: YELLOW
CREAT SERPL-MCNC: 0.59 MG/DL
CREAT SPEC-SCNC: 107 MG/DL
EGFRCR SERPLBLD CKD-EPI 2021: 103 ML/MIN/1.73M2
EOSINOPHIL # BLD AUTO: 0 K/UL
EOSINOPHIL NFR BLD AUTO: 0 %
ESTIMATED AVERAGE GLUCOSE: 151 MG/DL
GLUCOSE QUALITATIVE U: NEGATIVE MG/DL
GLUCOSE SERPL-MCNC: 136 MG/DL
HBA1C MFR BLD HPLC: 6.9 %
HCT VFR BLD CALC: 40.2 %
HDLC SERPL-MCNC: 43 MG/DL
HGB BLD-MCNC: 11.7 G/DL
IMM GRANULOCYTES NFR BLD AUTO: 0.3 %
KETONES URINE: NEGATIVE MG/DL
LDLC SERPL-MCNC: 105 MG/DL
LEUKOCYTE ESTERASE URINE: ABNORMAL
LYMPHOCYTES # BLD AUTO: 3.19 K/UL
LYMPHOCYTES NFR BLD AUTO: 31 %
MAN DIFF?: NORMAL
MCHC RBC-ENTMCNC: 18.5 PG
MCHC RBC-ENTMCNC: 29.1 G/DL
MCV RBC AUTO: 63.6 FL
MICROALBUMIN 24H UR DL<=1MG/L-MCNC: <1.2 MG/DL
MICROALBUMIN/CREAT 24H UR-RTO: NORMAL MG/G
MONOCYTES # BLD AUTO: 0.59 K/UL
MONOCYTES NFR BLD AUTO: 5.7 %
NEUTROPHILS # BLD AUTO: 6.4 K/UL
NEUTROPHILS NFR BLD AUTO: 62.3 %
NITRITE URINE: NEGATIVE
NONHDLC SERPL-MCNC: 132 MG/DL
PH URINE: 5.5
PLATELET # BLD AUTO: 419 K/UL
POTASSIUM SERPL-SCNC: 4.4 MMOL/L
PROT SERPL-MCNC: 6.4 G/DL
PROTEIN URINE: NEGATIVE MG/DL
RBC # BLD: 6.32 M/UL
RBC # FLD: 19 %
SODIUM SERPL-SCNC: 142 MMOL/L
SPECIFIC GRAVITY URINE: 1.02
T3 SERPL-MCNC: 102 NG/DL
T3FREE SERPL-MCNC: 2.74 PG/ML
T4 FREE SERPL-MCNC: 1.4 NG/DL
T4 SERPL-MCNC: 7.7 UG/DL
TRIGL SERPL-MCNC: 153 MG/DL
TSH SERPL-ACNC: 1.35 UIU/ML
URATE SERPL-MCNC: 5.9 MG/DL
UROBILINOGEN URINE: 0.2 MG/DL
WBC # FLD AUTO: 10.28 K/UL

## 2025-05-01 ENCOUNTER — APPOINTMENT (OUTPATIENT)
Dept: FAMILY MEDICINE | Facility: CLINIC | Age: 60
End: 2025-05-01
Payer: MEDICAID

## 2025-05-01 DIAGNOSIS — R71.8 OTHER ABNORMALITY OF RED BLOOD CELLS: ICD-10-CM

## 2025-05-01 DIAGNOSIS — R82.90 UNSPECIFIED ABNORMAL FINDINGS IN URINE: ICD-10-CM

## 2025-05-01 DIAGNOSIS — E11.9 TYPE 2 DIABETES MELLITUS W/OUT COMPLICATIONS: ICD-10-CM

## 2025-05-01 DIAGNOSIS — D75.839 THROMBOCYTOSIS, UNSPECIFIED: ICD-10-CM

## 2025-05-01 DIAGNOSIS — E78.2 MIXED HYPERLIPIDEMIA: ICD-10-CM

## 2025-05-01 DIAGNOSIS — E55.9 VITAMIN D DEFICIENCY, UNSPECIFIED: ICD-10-CM

## 2025-05-01 PROCEDURE — G2211 COMPLEX E/M VISIT ADD ON: CPT | Mod: NC

## 2025-05-01 PROCEDURE — 99214 OFFICE O/P EST MOD 30 MIN: CPT

## 2025-06-23 ENCOUNTER — RX RENEWAL (OUTPATIENT)
Age: 60
End: 2025-06-23

## 2025-08-03 PROBLEM — J06.9 ACUTE UPPER RESPIRATORY INFECTION: Status: RESOLVED | Noted: 2025-04-23 | Resolved: 2025-08-03

## 2025-08-08 ENCOUNTER — APPOINTMENT (OUTPATIENT)
Dept: FAMILY MEDICINE | Facility: CLINIC | Age: 60
End: 2025-08-08
Payer: MEDICAID

## 2025-08-08 ENCOUNTER — LABORATORY RESULT (OUTPATIENT)
Age: 60
End: 2025-08-08

## 2025-08-08 VITALS
BODY MASS INDEX: 31.15 KG/M2 | WEIGHT: 165 LBS | HEIGHT: 61 IN | TEMPERATURE: 97.9 F | SYSTOLIC BLOOD PRESSURE: 116 MMHG | HEART RATE: 99 BPM | DIASTOLIC BLOOD PRESSURE: 81 MMHG | OXYGEN SATURATION: 97 %

## 2025-08-08 DIAGNOSIS — Z12.11 ENCOUNTER FOR SCREENING FOR MALIGNANT NEOPLASM OF COLON: ICD-10-CM

## 2025-08-08 DIAGNOSIS — E78.2 MIXED HYPERLIPIDEMIA: ICD-10-CM

## 2025-08-08 DIAGNOSIS — I10 ESSENTIAL (PRIMARY) HYPERTENSION: ICD-10-CM

## 2025-08-08 DIAGNOSIS — E55.9 VITAMIN D DEFICIENCY, UNSPECIFIED: ICD-10-CM

## 2025-08-08 DIAGNOSIS — E11.9 TYPE 2 DIABETES MELLITUS W/OUT COMPLICATIONS: ICD-10-CM

## 2025-08-08 DIAGNOSIS — Z00.01 ENCOUNTER FOR GENERAL ADULT MEDICAL EXAMINATION WITH ABNORMAL FINDINGS: ICD-10-CM

## 2025-08-08 DIAGNOSIS — Z12.39 ENCOUNTER FOR OTHER SCREENING FOR MALIGNANT NEOPLASM OF BREAST: ICD-10-CM

## 2025-08-08 DIAGNOSIS — J06.9 ACUTE UPPER RESPIRATORY INFECTION, UNSPECIFIED: ICD-10-CM

## 2025-08-08 DIAGNOSIS — D56.9 THALASSEMIA, UNSPECIFIED: ICD-10-CM

## 2025-08-08 DIAGNOSIS — D75.839 THROMBOCYTOSIS, UNSPECIFIED: ICD-10-CM

## 2025-08-08 PROCEDURE — 93000 ELECTROCARDIOGRAM COMPLETE: CPT

## 2025-08-08 PROCEDURE — 99396 PREV VISIT EST AGE 40-64: CPT

## 2025-08-08 PROCEDURE — 36415 COLL VENOUS BLD VENIPUNCTURE: CPT

## 2025-08-29 ENCOUNTER — APPOINTMENT (OUTPATIENT)
Dept: FAMILY MEDICINE | Facility: CLINIC | Age: 60
End: 2025-08-29
Payer: MEDICAID

## 2025-08-29 DIAGNOSIS — E55.9 VITAMIN D DEFICIENCY, UNSPECIFIED: ICD-10-CM

## 2025-08-29 DIAGNOSIS — E11.9 TYPE 2 DIABETES MELLITUS W/OUT COMPLICATIONS: ICD-10-CM

## 2025-08-29 DIAGNOSIS — Z23 ENCOUNTER FOR IMMUNIZATION: ICD-10-CM

## 2025-08-29 DIAGNOSIS — R82.90 UNSPECIFIED ABNORMAL FINDINGS IN URINE: ICD-10-CM

## 2025-08-29 DIAGNOSIS — E78.2 MIXED HYPERLIPIDEMIA: ICD-10-CM

## 2025-08-29 DIAGNOSIS — D75.839 THROMBOCYTOSIS, UNSPECIFIED: ICD-10-CM

## 2025-08-29 DIAGNOSIS — D64.9 ANEMIA, UNSPECIFIED: ICD-10-CM

## 2025-08-29 PROCEDURE — 99214 OFFICE O/P EST MOD 30 MIN: CPT | Mod: 25

## 2025-08-29 PROCEDURE — 90750 HZV VACC RECOMBINANT IM: CPT

## 2025-08-29 PROCEDURE — 90471 IMMUNIZATION ADMIN: CPT

## 2025-09-23 PROBLEM — J06.9 ACUTE UPPER RESPIRATORY INFECTION: Status: ACTIVE | Noted: 2025-09-23
